# Patient Record
Sex: FEMALE | Race: BLACK OR AFRICAN AMERICAN | NOT HISPANIC OR LATINO | URBAN - METROPOLITAN AREA
[De-identification: names, ages, dates, MRNs, and addresses within clinical notes are randomized per-mention and may not be internally consistent; named-entity substitution may affect disease eponyms.]

---

## 2017-01-02 ENCOUNTER — EMERGENCY (EMERGENCY)
Facility: HOSPITAL | Age: 47
LOS: 0 days | Discharge: HOME | End: 2017-01-02
Attending: EMERGENCY MEDICINE
Payer: SELF-PAY

## 2017-01-02 VITALS
HEIGHT: 63 IN | OXYGEN SATURATION: 100 % | DIASTOLIC BLOOD PRESSURE: 78 MMHG | WEIGHT: 153 LBS | SYSTOLIC BLOOD PRESSURE: 129 MMHG | TEMPERATURE: 98 F | RESPIRATION RATE: 18 BRPM | HEART RATE: 84 BPM

## 2017-01-02 VITALS
RESPIRATION RATE: 17 BRPM | OXYGEN SATURATION: 100 % | HEART RATE: 67 BPM | DIASTOLIC BLOOD PRESSURE: 74 MMHG | SYSTOLIC BLOOD PRESSURE: 118 MMHG

## 2017-01-02 DIAGNOSIS — E11.9 TYPE 2 DIABETES MELLITUS WITHOUT COMPLICATIONS: ICD-10-CM

## 2017-01-02 DIAGNOSIS — I10 ESSENTIAL (PRIMARY) HYPERTENSION: ICD-10-CM

## 2017-01-02 DIAGNOSIS — K59.01 SLOW TRANSIT CONSTIPATION: ICD-10-CM

## 2017-01-02 DIAGNOSIS — D50.9 IRON DEFICIENCY ANEMIA, UNSPECIFIED: ICD-10-CM

## 2017-01-02 DIAGNOSIS — R10.9 UNSPECIFIED ABDOMINAL PAIN: ICD-10-CM

## 2017-01-02 DIAGNOSIS — O00.9 ECTOPIC PREGNANCY, UNSPECIFIED: Chronic | ICD-10-CM

## 2017-01-02 LAB
APPEARANCE UR: CLEAR — SIGNIFICANT CHANGE UP
BACTERIA # UR AUTO: ABNORMAL
BILIRUB UR-MCNC: NEGATIVE — SIGNIFICANT CHANGE UP
COLOR SPEC: YELLOW — SIGNIFICANT CHANGE UP
DIFF PNL FLD: ABNORMAL
EPI CELLS # UR: SIGNIFICANT CHANGE UP
GLUCOSE UR QL: NEGATIVE MG/DL — SIGNIFICANT CHANGE UP
HCG UR QL: NEGATIVE — SIGNIFICANT CHANGE UP
HYALINE CASTS # UR AUTO: ABNORMAL /LPF
KETONES UR-MCNC: NEGATIVE — SIGNIFICANT CHANGE UP
LEUKOCYTE ESTERASE UR-ACNC: NEGATIVE — SIGNIFICANT CHANGE UP
NITRITE UR-MCNC: NEGATIVE — SIGNIFICANT CHANGE UP
PH UR: 5 — SIGNIFICANT CHANGE UP (ref 4.8–8)
PROT UR-MCNC: NEGATIVE MG/DL — SIGNIFICANT CHANGE UP
RBC CASTS # UR COMP ASSIST: SIGNIFICANT CHANGE UP /HPF (ref 0–4)
SP GR SPEC: 1.02 — SIGNIFICANT CHANGE UP (ref 1.01–1.02)
UROBILINOGEN FLD QL: NEGATIVE MG/DL — SIGNIFICANT CHANGE UP
WBC UR QL: SIGNIFICANT CHANGE UP

## 2017-01-02 PROCEDURE — 99284 EMERGENCY DEPT VISIT MOD MDM: CPT | Mod: 25

## 2017-01-02 PROCEDURE — 99053 MED SERV 10PM-8AM 24 HR FAC: CPT

## 2017-01-02 PROCEDURE — 74000: CPT | Mod: 26

## 2017-01-02 RX ORDER — SENNA PLUS 8.6 MG/1
1 TABLET ORAL
Qty: 14 | Refills: 0 | OUTPATIENT
Start: 2017-01-02 | End: 2017-01-16

## 2017-01-02 RX ORDER — DOCUSATE SODIUM 100 MG
1 CAPSULE ORAL
Qty: 14 | Refills: 0 | OUTPATIENT
Start: 2017-01-02 | End: 2017-01-09

## 2017-01-02 NOTE — ED PROVIDER NOTE - MEDICAL DECISION MAKING DETAILS
patient pw constipation that is likely preventing bladder from full distension. no evidence of uti. does not give hx suggestive of sti. doubt appendicitis given absence of tenderness. will tx with stool softeners.

## 2017-01-02 NOTE — ED PROVIDER NOTE - OBJECTIVE STATEMENT
Pertinent PMH/PSH/FHx/SHx and Review of Systems contained within:  46f hx of htn, dm pw abd discomfort x2 days. patient has the sensation that passing bowel movements is hard for her and as a result she is having urinary frequency but of small amounts. no dysuria, no vaginal dc, no dispareunia, no vomiting, no fever.   Fh and Sh not otherwise contributory  ROS otherwise negative

## 2017-01-03 LAB
CULTURE RESULTS: SIGNIFICANT CHANGE UP
SPECIMEN SOURCE: SIGNIFICANT CHANGE UP

## 2017-02-07 ENCOUNTER — EMERGENCY (EMERGENCY)
Facility: HOSPITAL | Age: 47
LOS: 0 days | Discharge: HOME | End: 2017-02-07
Attending: EMERGENCY MEDICINE
Payer: COMMERCIAL

## 2017-02-07 VITALS
DIASTOLIC BLOOD PRESSURE: 78 MMHG | RESPIRATION RATE: 18 BRPM | SYSTOLIC BLOOD PRESSURE: 130 MMHG | OXYGEN SATURATION: 100 % | TEMPERATURE: 98 F | HEART RATE: 88 BPM

## 2017-02-07 VITALS
SYSTOLIC BLOOD PRESSURE: 145 MMHG | RESPIRATION RATE: 18 BRPM | HEART RATE: 100 BPM | HEIGHT: 62 IN | DIASTOLIC BLOOD PRESSURE: 82 MMHG | WEIGHT: 158.07 LBS | OXYGEN SATURATION: 100 % | TEMPERATURE: 98 F

## 2017-02-07 DIAGNOSIS — O00.9 ECTOPIC PREGNANCY, UNSPECIFIED: Chronic | ICD-10-CM

## 2017-02-07 DIAGNOSIS — E11.9 TYPE 2 DIABETES MELLITUS WITHOUT COMPLICATIONS: ICD-10-CM

## 2017-02-07 DIAGNOSIS — Z79.4 LONG TERM (CURRENT) USE OF INSULIN: ICD-10-CM

## 2017-02-07 DIAGNOSIS — Z88.8 ALLERGY STATUS TO OTHER DRUGS, MEDICAMENTS AND BIOLOGICAL SUBSTANCES STATUS: ICD-10-CM

## 2017-02-07 DIAGNOSIS — D50.9 IRON DEFICIENCY ANEMIA, UNSPECIFIED: ICD-10-CM

## 2017-02-07 DIAGNOSIS — I10 ESSENTIAL (PRIMARY) HYPERTENSION: ICD-10-CM

## 2017-02-07 DIAGNOSIS — R00.2 PALPITATIONS: ICD-10-CM

## 2017-02-07 LAB
ALBUMIN SERPL ELPH-MCNC: 3.6 G/DL — SIGNIFICANT CHANGE UP (ref 3.3–5)
ALP SERPL-CCNC: 52 U/L — SIGNIFICANT CHANGE UP (ref 40–120)
ALT FLD-CCNC: 17 U/L — SIGNIFICANT CHANGE UP (ref 12–78)
ANION GAP SERPL CALC-SCNC: 7 MMOL/L — SIGNIFICANT CHANGE UP (ref 5–17)
ANISOCYTOSIS BLD QL: SLIGHT — SIGNIFICANT CHANGE UP
AST SERPL-CCNC: 11 U/L — LOW (ref 15–37)
BASOPHILS # BLD AUTO: 0 K/UL — SIGNIFICANT CHANGE UP (ref 0–0.2)
BASOPHILS NFR BLD AUTO: 1 % — SIGNIFICANT CHANGE UP (ref 0–2)
BILIRUB SERPL-MCNC: 0.4 MG/DL — SIGNIFICANT CHANGE UP (ref 0.2–1.2)
BUN SERPL-MCNC: 9 MG/DL — SIGNIFICANT CHANGE UP (ref 7–23)
CALCIUM SERPL-MCNC: 8.6 MG/DL — SIGNIFICANT CHANGE UP (ref 8.5–10.1)
CHLORIDE SERPL-SCNC: 106 MMOL/L — SIGNIFICANT CHANGE UP (ref 96–108)
CK MB BLD-MCNC: 1.1 % — SIGNIFICANT CHANGE UP (ref 0–3.5)
CK MB CFR SERPL CALC: 1.8 NG/ML — SIGNIFICANT CHANGE UP (ref 0.5–3.6)
CK SERPL-CCNC: 164 U/L — SIGNIFICANT CHANGE UP (ref 26–192)
CO2 SERPL-SCNC: 28 MMOL/L — SIGNIFICANT CHANGE UP (ref 22–31)
CREAT SERPL-MCNC: 0.79 MG/DL — SIGNIFICANT CHANGE UP (ref 0.5–1.3)
EOSINOPHIL # BLD AUTO: 0.1 K/UL — SIGNIFICANT CHANGE UP (ref 0–0.5)
EOSINOPHIL NFR BLD AUTO: 1.9 % — SIGNIFICANT CHANGE UP (ref 0–6)
GLUCOSE SERPL-MCNC: 141 MG/DL — HIGH (ref 70–99)
HCT VFR BLD CALC: 32.8 % — LOW (ref 34.5–45)
HGB BLD-MCNC: 10.6 G/DL — LOW (ref 11.5–15.5)
HYPOCHROMIA BLD QL: SIGNIFICANT CHANGE UP
LG PLATELETS BLD QL AUTO: SLIGHT — SIGNIFICANT CHANGE UP
LYMPHOCYTES # BLD AUTO: 1.4 K/UL — SIGNIFICANT CHANGE UP (ref 1–3.3)
LYMPHOCYTES # BLD AUTO: 30.4 % — SIGNIFICANT CHANGE UP (ref 13–44)
MACROCYTES BLD QL: SLIGHT — SIGNIFICANT CHANGE UP
MCHC RBC-ENTMCNC: 22.9 PG — LOW (ref 27–34)
MCHC RBC-ENTMCNC: 32.3 GM/DL — SIGNIFICANT CHANGE UP (ref 32–36)
MCV RBC AUTO: 70.8 FL — LOW (ref 80–100)
MICROCYTES BLD QL: SLIGHT — SIGNIFICANT CHANGE UP
MONOCYTES # BLD AUTO: 0.3 K/UL — SIGNIFICANT CHANGE UP (ref 0–0.9)
MONOCYTES NFR BLD AUTO: 6.8 % — SIGNIFICANT CHANGE UP (ref 2–14)
NEUTROPHILS # BLD AUTO: 2.7 K/UL — SIGNIFICANT CHANGE UP (ref 1.8–7.4)
NEUTROPHILS NFR BLD AUTO: 59.9 % — SIGNIFICANT CHANGE UP (ref 43–77)
OVALOCYTES BLD QL SMEAR: SIGNIFICANT CHANGE UP
PLAT MORPH BLD: NORMAL — SIGNIFICANT CHANGE UP
PLATELET # BLD AUTO: 216 K/UL — SIGNIFICANT CHANGE UP (ref 150–400)
POIKILOCYTOSIS BLD QL AUTO: SLIGHT — SIGNIFICANT CHANGE UP
POLYCHROMASIA BLD QL SMEAR: SLIGHT — SIGNIFICANT CHANGE UP
POTASSIUM SERPL-MCNC: 3.8 MMOL/L — SIGNIFICANT CHANGE UP (ref 3.5–5.3)
POTASSIUM SERPL-SCNC: 3.8 MMOL/L — SIGNIFICANT CHANGE UP (ref 3.5–5.3)
PROT SERPL-MCNC: 7.3 GM/DL — SIGNIFICANT CHANGE UP (ref 6–8.3)
RBC # BLD: 4.63 M/UL — SIGNIFICANT CHANGE UP (ref 3.8–5.2)
RBC # FLD: 15.2 % — HIGH (ref 11–15)
RBC BLD AUTO: ABNORMAL
SCHISTOCYTES BLD QL AUTO: SLIGHT — SIGNIFICANT CHANGE UP
SODIUM SERPL-SCNC: 141 MMOL/L — SIGNIFICANT CHANGE UP (ref 135–145)
TARGETS BLD QL SMEAR: SLIGHT — SIGNIFICANT CHANGE UP
TROPONIN I SERPL-MCNC: <.015 NG/ML — SIGNIFICANT CHANGE UP (ref 0.01–0.04)
TSH SERPL-MCNC: 2.21 UIU/ML — SIGNIFICANT CHANGE UP (ref 0.36–3.74)
WBC # BLD: 4.6 K/UL — SIGNIFICANT CHANGE UP (ref 3.8–10.5)
WBC # FLD AUTO: 4.6 K/UL — SIGNIFICANT CHANGE UP (ref 3.8–10.5)

## 2017-02-07 PROCEDURE — 99285 EMERGENCY DEPT VISIT HI MDM: CPT | Mod: 25

## 2017-02-07 NOTE — ED PROVIDER NOTE - PHYSICAL EXAMINATION
Gen: Alert, NAD, speaking in complete sentences;  Head: NC, AT, EOMI, normal lids/conjunctiva;  ENT: normal hearing, patent oropharynx, MMM;  Neck: supple, no tenderness/meningismus, FROM;  Pulm: Bilateral clear BS, normal resp effort, no wheeze/stridor/retractions;  CV: RRR, no M/R/G, +dist pulses;  Abd: soft, NT/ND, +BS, no guarding/rebound tenderness;  Mskel: no edema/erythema/cyanosis;  Skin: no rash;  Neuro: AAOx3, no sensory/motor deficits

## 2017-02-07 NOTE — ED PROVIDER NOTE - MEDICAL DECISION MAKING DETAILS
Pt in NAD, VSS.  Discussed results and outcome of testing with the patient, given copy as well.  Patient advised to please follow up with their primary care doctor within the next 24 hours and return to the Emergency Department for worsening symptoms or any other concerns.  Patient advised that their doctor may call  to follow up on the specific results of the tests performed today in the emergency department.

## 2017-02-07 NOTE — ED ADULT TRIAGE NOTE - CHIEF COMPLAINT QUOTE
I've having palpitations.  I went home from work and went to bed.  I woke up with my chest pounding.

## 2017-02-07 NOTE — ED PROVIDER NOTE - OBJECTIVE STATEMENT
Pertinent PMH/PSH/FHx/SHx and Review of Systems contained within:    45yo F w PMH of HTN, DM, iron deficiency anemia, uterine fibroids presents to ED c/o palpitations.  Pt states she was in bed trying to sleep when she was woken up by feeling of palpitations.  Pt states she drank orange juice and took GERD medications, currently in ED states sx resolved.  Denies fever, chills, cough, SOB, CP, abd pain.  Pt states she did have caffeine during the day which may have contributed to sx.    No fever/chills, No photophobia/eye pain/changes in vision, No ear pain/sore throat/dysphagia, No chest pain, no SOB/cough/wheeze/stridor, No abdominal pain, No N/V/D, no dysuria/frequency/discharge, No neck/back pain, no rash, no changes in neurological status/function.

## 2017-04-17 ENCOUNTER — EMERGENCY (EMERGENCY)
Facility: HOSPITAL | Age: 47
LOS: 1 days | Discharge: HOME | End: 2017-04-17
Attending: EMERGENCY MEDICINE
Payer: COMMERCIAL

## 2017-04-17 VITALS
RESPIRATION RATE: 16 BRPM | HEART RATE: 85 BPM | OXYGEN SATURATION: 100 % | TEMPERATURE: 99 F | WEIGHT: 154.1 LBS | HEIGHT: 63 IN | SYSTOLIC BLOOD PRESSURE: 118 MMHG | DIASTOLIC BLOOD PRESSURE: 74 MMHG

## 2017-04-17 VITALS
OXYGEN SATURATION: 99 % | DIASTOLIC BLOOD PRESSURE: 77 MMHG | TEMPERATURE: 98 F | RESPIRATION RATE: 16 BRPM | HEART RATE: 79 BPM | SYSTOLIC BLOOD PRESSURE: 114 MMHG

## 2017-04-17 DIAGNOSIS — O00.9 ECTOPIC PREGNANCY, UNSPECIFIED: Chronic | ICD-10-CM

## 2017-04-17 DIAGNOSIS — Z79.2 LONG TERM (CURRENT) USE OF ANTIBIOTICS: ICD-10-CM

## 2017-04-17 DIAGNOSIS — R07.9 CHEST PAIN, UNSPECIFIED: ICD-10-CM

## 2017-04-17 DIAGNOSIS — D50.9 IRON DEFICIENCY ANEMIA, UNSPECIFIED: ICD-10-CM

## 2017-04-17 DIAGNOSIS — I10 ESSENTIAL (PRIMARY) HYPERTENSION: ICD-10-CM

## 2017-04-17 DIAGNOSIS — E11.9 TYPE 2 DIABETES MELLITUS WITHOUT COMPLICATIONS: ICD-10-CM

## 2017-04-17 DIAGNOSIS — Z79.84 LONG TERM (CURRENT) USE OF ORAL HYPOGLYCEMIC DRUGS: ICD-10-CM

## 2017-04-17 DIAGNOSIS — J06.9 ACUTE UPPER RESPIRATORY INFECTION, UNSPECIFIED: ICD-10-CM

## 2017-04-17 PROCEDURE — 99283 EMERGENCY DEPT VISIT LOW MDM: CPT | Mod: 25

## 2017-04-17 NOTE — ED ADULT TRIAGE NOTE - CHIEF COMPLAINT QUOTE
Patient reports "coughing up yellow, Body aches, losing my voice and sore throat." Patient with symptoms since Thursday. no relief with allergy medication.

## 2017-04-17 NOTE — ED PROVIDER NOTE - OBJECTIVE STATEMENT
Pt is a 48 yo lady with a pmhx of HTN, DM who presents to the ED with URI symptoms for 3 days. She has had a sore throat, cough with some yellow sputum. No fevers, no chest pain, no sob, no n/v/d, no body aches. Had a flu shot this year. No sick contacts. Breathing comfortably. Wanted to get checked out.

## 2017-04-17 NOTE — ED ADULT NURSE NOTE - OBJECTIVE STATEMENT
pt is AxOx4; c/o "coughing up yellow, Body aches, losing my voice and sore throat." Patient with symptoms since Thursday.

## 2017-12-01 ENCOUNTER — OUTPATIENT (OUTPATIENT)
Dept: OUTPATIENT SERVICES | Facility: HOSPITAL | Age: 47
LOS: 1 days | End: 2017-12-01
Payer: MEDICAID

## 2017-12-01 DIAGNOSIS — O00.9 ECTOPIC PREGNANCY, UNSPECIFIED: Chronic | ICD-10-CM

## 2017-12-01 PROCEDURE — G9001: CPT

## 2017-12-17 ENCOUNTER — EMERGENCY (EMERGENCY)
Facility: HOSPITAL | Age: 47
LOS: 0 days | Discharge: HOME | End: 2017-12-17
Attending: EMERGENCY MEDICINE
Payer: COMMERCIAL

## 2017-12-17 VITALS
RESPIRATION RATE: 17 BRPM | HEART RATE: 81 BPM | DIASTOLIC BLOOD PRESSURE: 73 MMHG | TEMPERATURE: 98 F | OXYGEN SATURATION: 100 % | SYSTOLIC BLOOD PRESSURE: 115 MMHG

## 2017-12-17 VITALS
WEIGHT: 151.9 LBS | OXYGEN SATURATION: 99 % | RESPIRATION RATE: 16 BRPM | DIASTOLIC BLOOD PRESSURE: 73 MMHG | TEMPERATURE: 98 F | SYSTOLIC BLOOD PRESSURE: 122 MMHG | HEIGHT: 63 IN | HEART RATE: 91 BPM

## 2017-12-17 DIAGNOSIS — O00.9 ECTOPIC PREGNANCY, UNSPECIFIED: Chronic | ICD-10-CM

## 2017-12-17 LAB
ALBUMIN SERPL ELPH-MCNC: 3.6 G/DL — SIGNIFICANT CHANGE UP (ref 3.3–5)
ALP SERPL-CCNC: 51 U/L — SIGNIFICANT CHANGE UP (ref 40–120)
ALT FLD-CCNC: 18 U/L — SIGNIFICANT CHANGE UP (ref 12–78)
ANION GAP SERPL CALC-SCNC: 7 MMOL/L — SIGNIFICANT CHANGE UP (ref 5–17)
AST SERPL-CCNC: 14 U/L — LOW (ref 15–37)
BILIRUB SERPL-MCNC: 0.2 MG/DL — SIGNIFICANT CHANGE UP (ref 0.2–1.2)
BUN SERPL-MCNC: 8 MG/DL — SIGNIFICANT CHANGE UP (ref 7–23)
CALCIUM SERPL-MCNC: 8.3 MG/DL — LOW (ref 8.5–10.1)
CHLORIDE SERPL-SCNC: 107 MMOL/L — SIGNIFICANT CHANGE UP (ref 96–108)
CO2 SERPL-SCNC: 24 MMOL/L — SIGNIFICANT CHANGE UP (ref 22–31)
CREAT SERPL-MCNC: 0.72 MG/DL — SIGNIFICANT CHANGE UP (ref 0.5–1.3)
GLUCOSE SERPL-MCNC: 153 MG/DL — HIGH (ref 70–99)
HCG SERPL-ACNC: <1 MIU/ML — SIGNIFICANT CHANGE UP
HCT VFR BLD CALC: 34.2 % — LOW (ref 34.5–45)
HGB BLD-MCNC: 10.2 G/DL — LOW (ref 11.5–15.5)
MCHC RBC-ENTMCNC: 21.9 PG — LOW (ref 27–34)
MCHC RBC-ENTMCNC: 29.6 GM/DL — LOW (ref 32–36)
MCV RBC AUTO: 73.7 FL — LOW (ref 80–100)
PLATELET # BLD AUTO: 235 K/UL — SIGNIFICANT CHANGE UP (ref 150–400)
POTASSIUM SERPL-MCNC: 4.1 MMOL/L — SIGNIFICANT CHANGE UP (ref 3.5–5.3)
POTASSIUM SERPL-SCNC: 4.1 MMOL/L — SIGNIFICANT CHANGE UP (ref 3.5–5.3)
PROT SERPL-MCNC: 7.4 GM/DL — SIGNIFICANT CHANGE UP (ref 6–8.3)
RBC # BLD: 4.64 M/UL — SIGNIFICANT CHANGE UP (ref 3.8–5.2)
RBC # FLD: 14.1 % — SIGNIFICANT CHANGE UP (ref 11–15)
SODIUM SERPL-SCNC: 138 MMOL/L — SIGNIFICANT CHANGE UP (ref 135–145)
TROPONIN I SERPL-MCNC: <.015 NG/ML — SIGNIFICANT CHANGE UP (ref 0.01–0.04)
WBC # BLD: 4.8 K/UL — SIGNIFICANT CHANGE UP (ref 3.8–10.5)
WBC # FLD AUTO: 4.8 K/UL — SIGNIFICANT CHANGE UP (ref 3.8–10.5)

## 2017-12-17 PROCEDURE — 93010 ELECTROCARDIOGRAM REPORT: CPT

## 2017-12-17 PROCEDURE — 99284 EMERGENCY DEPT VISIT MOD MDM: CPT | Mod: 25

## 2017-12-17 NOTE — ED PROVIDER NOTE - MEDICAL DECISION MAKING DETAILS
48 yo F with chest discomfort  -basic labs, r/o anemia, check trop x1 to r/o MI, discomfort present for hours  -f/u results, reeval, d/c with [facility] f/u outpt.

## 2017-12-17 NOTE — ED ADULT TRIAGE NOTE - CHIEF COMPLAINT QUOTE
Pt is feeling "uneasy" and is stating she has no pain or discomfort anywhere, but the "uneasiness" has only gotten worse.

## 2017-12-17 NOTE — ED PROVIDER NOTE - PROGRESS NOTE DETAILS
Results reported to patient--grossly benign  Pt. reports feeling better  pt. agrees to f/u with primary, [facility], care outpt.  pt. understands to return to ED if symptoms worsen; will d/c

## 2017-12-17 NOTE — ED PROVIDER NOTE - OBJECTIVE STATEMENT
46 yo F with "uneasiness" in her chest this evening.  She says she just wasn't feeling right and she wanted to get checked out.  She notes hx of iron def anemia, previously requiring blood transfusion.  She denies chest pain.  No other complaints currently.  ROS: negative for fever, cough, headache, chest pain, shortness of breath, abd pain, nausea, vomiting, diarrhea, rash, paresthesia, and weakness.   PMH: HTN, NIDDM, iron def anemia; Meds: iron, metformin 500, amlodipine 5; SH: Denies smoking/drinking/drug use

## 2017-12-18 DIAGNOSIS — R07.9 CHEST PAIN, UNSPECIFIED: ICD-10-CM

## 2017-12-18 DIAGNOSIS — D50.9 IRON DEFICIENCY ANEMIA, UNSPECIFIED: ICD-10-CM

## 2017-12-18 DIAGNOSIS — E11.9 TYPE 2 DIABETES MELLITUS WITHOUT COMPLICATIONS: ICD-10-CM

## 2017-12-18 DIAGNOSIS — I10 ESSENTIAL (PRIMARY) HYPERTENSION: ICD-10-CM

## 2017-12-19 DIAGNOSIS — R69 ILLNESS, UNSPECIFIED: ICD-10-CM

## 2018-04-29 ENCOUNTER — EMERGENCY (EMERGENCY)
Facility: HOSPITAL | Age: 48
LOS: 0 days | Discharge: HOME | End: 2018-04-29
Attending: EMERGENCY MEDICINE
Payer: MEDICAID

## 2018-04-29 VITALS
DIASTOLIC BLOOD PRESSURE: 58 MMHG | TEMPERATURE: 98 F | RESPIRATION RATE: 20 BRPM | HEART RATE: 75 BPM | SYSTOLIC BLOOD PRESSURE: 101 MMHG | OXYGEN SATURATION: 99 %

## 2018-04-29 VITALS
SYSTOLIC BLOOD PRESSURE: 118 MMHG | DIASTOLIC BLOOD PRESSURE: 71 MMHG | WEIGHT: 151.9 LBS | RESPIRATION RATE: 16 BRPM | HEART RATE: 88 BPM | TEMPERATURE: 98 F | OXYGEN SATURATION: 100 % | HEIGHT: 63 IN

## 2018-04-29 DIAGNOSIS — O00.9 ECTOPIC PREGNANCY, UNSPECIFIED: Chronic | ICD-10-CM

## 2018-04-29 LAB
ALBUMIN SERPL ELPH-MCNC: 3.7 G/DL — SIGNIFICANT CHANGE UP (ref 3.3–5)
ALP SERPL-CCNC: 59 U/L — SIGNIFICANT CHANGE UP (ref 40–120)
ALT FLD-CCNC: 17 U/L — SIGNIFICANT CHANGE UP (ref 12–78)
ANION GAP SERPL CALC-SCNC: 7 MMOL/L — SIGNIFICANT CHANGE UP (ref 5–17)
APTT BLD: 33.4 SEC — SIGNIFICANT CHANGE UP (ref 27.5–37.4)
AST SERPL-CCNC: 13 U/L — LOW (ref 15–37)
BASOPHILS # BLD AUTO: 0.04 K/UL — SIGNIFICANT CHANGE UP (ref 0–0.2)
BASOPHILS NFR BLD AUTO: 1.2 % — SIGNIFICANT CHANGE UP (ref 0–2)
BILIRUB SERPL-MCNC: 0.2 MG/DL — SIGNIFICANT CHANGE UP (ref 0.2–1.2)
BUN SERPL-MCNC: 8 MG/DL — SIGNIFICANT CHANGE UP (ref 7–23)
CALCIUM SERPL-MCNC: 8.8 MG/DL — SIGNIFICANT CHANGE UP (ref 8.5–10.1)
CHLORIDE SERPL-SCNC: 108 MMOL/L — SIGNIFICANT CHANGE UP (ref 96–108)
CK MB BLD-MCNC: 0.8 % — SIGNIFICANT CHANGE UP (ref 0–3.5)
CK MB BLD-MCNC: 1.2 % — SIGNIFICANT CHANGE UP (ref 0–3.5)
CK MB CFR SERPL CALC: 0.6 NG/ML — SIGNIFICANT CHANGE UP (ref 0.5–3.6)
CK MB CFR SERPL CALC: 0.8 NG/ML — SIGNIFICANT CHANGE UP (ref 0.5–3.6)
CK SERPL-CCNC: 67 U/L — SIGNIFICANT CHANGE UP (ref 26–192)
CK SERPL-CCNC: 72 U/L — SIGNIFICANT CHANGE UP (ref 26–192)
CO2 SERPL-SCNC: 24 MMOL/L — SIGNIFICANT CHANGE UP (ref 22–31)
CREAT SERPL-MCNC: 0.79 MG/DL — SIGNIFICANT CHANGE UP (ref 0.5–1.3)
D DIMER BLD IA.RAPID-MCNC: <150 NG/ML DDU — SIGNIFICANT CHANGE UP
EOSINOPHIL # BLD AUTO: 0.06 K/UL — SIGNIFICANT CHANGE UP (ref 0–0.5)
EOSINOPHIL NFR BLD AUTO: 1.8 % — SIGNIFICANT CHANGE UP (ref 0–6)
GLUCOSE SERPL-MCNC: 106 MG/DL — HIGH (ref 70–99)
HCG SERPL-ACNC: <1 MIU/ML — SIGNIFICANT CHANGE UP
HCT VFR BLD CALC: 33.7 % — LOW (ref 34.5–45)
HGB BLD-MCNC: 10 G/DL — LOW (ref 11.5–15.5)
IMM GRANULOCYTES NFR BLD AUTO: 0.3 % — SIGNIFICANT CHANGE UP (ref 0–1.5)
INR BLD: 0.95 RATIO — SIGNIFICANT CHANGE UP (ref 0.88–1.16)
LYMPHOCYTES # BLD AUTO: 1.12 K/UL — SIGNIFICANT CHANGE UP (ref 1–3.3)
LYMPHOCYTES # BLD AUTO: 32.9 % — SIGNIFICANT CHANGE UP (ref 13–44)
MCHC RBC-ENTMCNC: 21.1 PG — LOW (ref 27–34)
MCHC RBC-ENTMCNC: 29.7 GM/DL — LOW (ref 32–36)
MCV RBC AUTO: 71.2 FL — LOW (ref 80–100)
MONOCYTES # BLD AUTO: 0.25 K/UL — SIGNIFICANT CHANGE UP (ref 0–0.9)
MONOCYTES NFR BLD AUTO: 7.4 % — SIGNIFICANT CHANGE UP (ref 2–14)
NEUTROPHILS # BLD AUTO: 1.92 K/UL — SIGNIFICANT CHANGE UP (ref 1.8–7.4)
NEUTROPHILS NFR BLD AUTO: 56.4 % — SIGNIFICANT CHANGE UP (ref 43–77)
NRBC # BLD: 0 /100 WBCS — SIGNIFICANT CHANGE UP (ref 0–0)
PLATELET # BLD AUTO: 288 K/UL — SIGNIFICANT CHANGE UP (ref 150–400)
POTASSIUM SERPL-MCNC: 3.8 MMOL/L — SIGNIFICANT CHANGE UP (ref 3.5–5.3)
POTASSIUM SERPL-SCNC: 3.8 MMOL/L — SIGNIFICANT CHANGE UP (ref 3.5–5.3)
PROT SERPL-MCNC: 7.3 GM/DL — SIGNIFICANT CHANGE UP (ref 6–8.3)
PROTHROM AB SERPL-ACNC: 10.3 SEC — SIGNIFICANT CHANGE UP (ref 9.8–12.7)
RBC # BLD: 4.73 M/UL — SIGNIFICANT CHANGE UP (ref 3.8–5.2)
RBC # FLD: 17.8 % — HIGH (ref 10.3–14.5)
SODIUM SERPL-SCNC: 139 MMOL/L — SIGNIFICANT CHANGE UP (ref 135–145)
TROPONIN I SERPL-MCNC: <.015 NG/ML — SIGNIFICANT CHANGE UP (ref 0.01–0.04)
TROPONIN I SERPL-MCNC: <.015 NG/ML — SIGNIFICANT CHANGE UP (ref 0.01–0.04)
TSH SERPL-MCNC: 0.92 UU/ML — SIGNIFICANT CHANGE UP (ref 0.36–3.74)
WBC # BLD: 3.4 K/UL — LOW (ref 3.8–10.5)
WBC # FLD AUTO: 3.4 K/UL — LOW (ref 3.8–10.5)

## 2018-04-29 PROCEDURE — 93010 ELECTROCARDIOGRAM REPORT: CPT

## 2018-04-29 PROCEDURE — 71045 X-RAY EXAM CHEST 1 VIEW: CPT | Mod: 26

## 2018-04-29 PROCEDURE — 99284 EMERGENCY DEPT VISIT MOD MDM: CPT

## 2018-04-29 RX ORDER — SODIUM CHLORIDE 9 MG/ML
3 INJECTION INTRAMUSCULAR; INTRAVENOUS; SUBCUTANEOUS ONCE
Qty: 0 | Refills: 0 | Status: COMPLETED | OUTPATIENT
Start: 2018-04-29 | End: 2018-04-29

## 2018-04-29 RX ADMIN — SODIUM CHLORIDE 3 MILLILITER(S): 9 INJECTION INTRAMUSCULAR; INTRAVENOUS; SUBCUTANEOUS at 12:58

## 2018-04-29 NOTE — ED ADULT NURSE NOTE - OBJECTIVE STATEMENT
received patient alert and orient came in today after having two episodes of palpitations today ivhl 320 by ems left a/c blood obtained. ekg done and placed on monitor. no chest pain. placed on monitor will report to primary rn Lainey

## 2018-04-29 NOTE — ED ADULT NURSE REASSESSMENT NOTE - NS ED NURSE REASSESS COMMENT FT1
patient A&Ox3 in no acute distress , denied palpitation no chest pain no difficulty breathing at this time

## 2018-04-29 NOTE — ED PROVIDER NOTE - OBJECTIVE STATEMENT
48yoF; with pmh signif for DM, Fibroids, Anemia, HTN; now p/w palpitations and lightheadedness. patient states she was running around in morning and felt lightheadedness, took her Amlodipine, then felt palpitations. chest pain--sscp, pressure, non-radiating, lasting few minutes, denies sob. denies f/c/s. denies cough. denies travel.  PMH: Fibroids, Anemia, HTN  SOCIAL: No tobacco/illicit substance use/socialEtOH

## 2018-04-29 NOTE — ED PROVIDER NOTE - NS ED ROS FT
Constitutional: (-) fever  (-)chills  (-)sweats  Eyes/ENT: (-) blurry vision, (-) epistaxis  (-)rhinorrhea   (-) sore throat    Cardiovascular: (+) chest pain, (+) palpitations (-) edema   Respiratory: (-) cough, (-) shortness of breath   Gastrointestinal: (-)nausea  (-)vomiting, (-) diarrhea  (-) abdominal pain   Musculoskeletal: (-) neck pain, (-) back pain, (-) joint pain  Integumentary: (-) rash, (-) edema  Neurological: (-) headache, (-) altered mental status  (-)LOC

## 2018-05-01 DIAGNOSIS — D50.9 IRON DEFICIENCY ANEMIA, UNSPECIFIED: ICD-10-CM

## 2018-05-01 DIAGNOSIS — R00.2 PALPITATIONS: ICD-10-CM

## 2018-05-01 DIAGNOSIS — D25.9 LEIOMYOMA OF UTERUS, UNSPECIFIED: ICD-10-CM

## 2018-05-01 DIAGNOSIS — E11.9 TYPE 2 DIABETES MELLITUS WITHOUT COMPLICATIONS: ICD-10-CM

## 2018-05-01 DIAGNOSIS — I10 ESSENTIAL (PRIMARY) HYPERTENSION: ICD-10-CM

## 2018-07-17 ENCOUNTER — EMERGENCY (EMERGENCY)
Facility: HOSPITAL | Age: 48
LOS: 0 days | Discharge: HOME | End: 2018-07-17
Attending: EMERGENCY MEDICINE
Payer: MEDICAID

## 2018-07-17 VITALS
OXYGEN SATURATION: 100 % | TEMPERATURE: 99 F | SYSTOLIC BLOOD PRESSURE: 148 MMHG | WEIGHT: 151.9 LBS | HEART RATE: 89 BPM | RESPIRATION RATE: 18 BRPM | DIASTOLIC BLOOD PRESSURE: 80 MMHG | HEIGHT: 62 IN

## 2018-07-17 VITALS
OXYGEN SATURATION: 99 % | SYSTOLIC BLOOD PRESSURE: 111 MMHG | TEMPERATURE: 99 F | DIASTOLIC BLOOD PRESSURE: 62 MMHG | HEART RATE: 70 BPM | RESPIRATION RATE: 18 BRPM

## 2018-07-17 DIAGNOSIS — R07.89 OTHER CHEST PAIN: ICD-10-CM

## 2018-07-17 DIAGNOSIS — D50.9 IRON DEFICIENCY ANEMIA, UNSPECIFIED: ICD-10-CM

## 2018-07-17 DIAGNOSIS — O00.9 ECTOPIC PREGNANCY, UNSPECIFIED: Chronic | ICD-10-CM

## 2018-07-17 DIAGNOSIS — I10 ESSENTIAL (PRIMARY) HYPERTENSION: ICD-10-CM

## 2018-07-17 DIAGNOSIS — E11.9 TYPE 2 DIABETES MELLITUS WITHOUT COMPLICATIONS: ICD-10-CM

## 2018-07-17 DIAGNOSIS — R10.9 UNSPECIFIED ABDOMINAL PAIN: ICD-10-CM

## 2018-07-17 LAB
ALBUMIN SERPL ELPH-MCNC: 3.7 G/DL — SIGNIFICANT CHANGE UP (ref 3.3–5)
ALP SERPL-CCNC: 50 U/L — SIGNIFICANT CHANGE UP (ref 40–120)
ALT FLD-CCNC: 16 U/L — SIGNIFICANT CHANGE UP (ref 12–78)
ANION GAP SERPL CALC-SCNC: 9 MMOL/L — SIGNIFICANT CHANGE UP (ref 5–17)
APTT BLD: 33.6 SEC — SIGNIFICANT CHANGE UP (ref 27.5–37.4)
AST SERPL-CCNC: 13 U/L — LOW (ref 15–37)
BILIRUB SERPL-MCNC: 0.3 MG/DL — SIGNIFICANT CHANGE UP (ref 0.2–1.2)
BUN SERPL-MCNC: 9 MG/DL — SIGNIFICANT CHANGE UP (ref 7–23)
CALCIUM SERPL-MCNC: 8.7 MG/DL — SIGNIFICANT CHANGE UP (ref 8.5–10.1)
CHLORIDE SERPL-SCNC: 106 MMOL/L — SIGNIFICANT CHANGE UP (ref 96–108)
CK MB BLD-MCNC: 0.9 % — SIGNIFICANT CHANGE UP (ref 0–3.5)
CK MB BLD-MCNC: 0.9 % — SIGNIFICANT CHANGE UP (ref 0–3.5)
CK MB CFR SERPL CALC: 0.9 NG/ML — SIGNIFICANT CHANGE UP (ref 0.5–3.6)
CK MB CFR SERPL CALC: 1 NG/ML — SIGNIFICANT CHANGE UP (ref 0.5–3.6)
CK SERPL-CCNC: 117 U/L — SIGNIFICANT CHANGE UP (ref 26–192)
CK SERPL-CCNC: 95 U/L — SIGNIFICANT CHANGE UP (ref 26–192)
CO2 SERPL-SCNC: 26 MMOL/L — SIGNIFICANT CHANGE UP (ref 22–31)
CREAT SERPL-MCNC: 0.69 MG/DL — SIGNIFICANT CHANGE UP (ref 0.5–1.3)
D DIMER BLD IA.RAPID-MCNC: <150 NG/ML DDU — SIGNIFICANT CHANGE UP
GLUCOSE SERPL-MCNC: 98 MG/DL — SIGNIFICANT CHANGE UP (ref 70–99)
HCG SERPL-ACNC: <1 MIU/ML — SIGNIFICANT CHANGE UP
HCT VFR BLD CALC: 32.6 % — LOW (ref 34.5–45)
HGB BLD-MCNC: 9.9 G/DL — LOW (ref 11.5–15.5)
INR BLD: 0.97 RATIO — SIGNIFICANT CHANGE UP (ref 0.88–1.16)
MCHC RBC-ENTMCNC: 21.8 PG — LOW (ref 27–34)
MCHC RBC-ENTMCNC: 30.4 GM/DL — LOW (ref 32–36)
MCV RBC AUTO: 71.8 FL — LOW (ref 80–100)
NRBC # BLD: 0 /100 WBCS — SIGNIFICANT CHANGE UP (ref 0–0)
NT-PROBNP SERPL-SCNC: 52 PG/ML — SIGNIFICANT CHANGE UP (ref 0–125)
PLATELET # BLD AUTO: 286 K/UL — SIGNIFICANT CHANGE UP (ref 150–400)
POTASSIUM SERPL-MCNC: 3.8 MMOL/L — SIGNIFICANT CHANGE UP (ref 3.5–5.3)
POTASSIUM SERPL-SCNC: 3.8 MMOL/L — SIGNIFICANT CHANGE UP (ref 3.5–5.3)
PROT SERPL-MCNC: 7.5 GM/DL — SIGNIFICANT CHANGE UP (ref 6–8.3)
PROTHROM AB SERPL-ACNC: 10.6 SEC — SIGNIFICANT CHANGE UP (ref 9.8–12.7)
RBC # BLD: 4.54 M/UL — SIGNIFICANT CHANGE UP (ref 3.8–5.2)
RBC # FLD: 17.6 % — HIGH (ref 10.3–14.5)
SODIUM SERPL-SCNC: 141 MMOL/L — SIGNIFICANT CHANGE UP (ref 135–145)
TROPONIN I SERPL-MCNC: <.015 NG/ML — SIGNIFICANT CHANGE UP (ref 0.01–0.04)
TROPONIN I SERPL-MCNC: <.015 NG/ML — SIGNIFICANT CHANGE UP (ref 0.01–0.04)
WBC # BLD: 6.08 K/UL — SIGNIFICANT CHANGE UP (ref 3.8–10.5)
WBC # FLD AUTO: 6.08 K/UL — SIGNIFICANT CHANGE UP (ref 3.8–10.5)

## 2018-07-17 PROCEDURE — 71045 X-RAY EXAM CHEST 1 VIEW: CPT | Mod: 26

## 2018-07-17 PROCEDURE — 99285 EMERGENCY DEPT VISIT HI MDM: CPT

## 2018-07-17 RX ORDER — MORPHINE SULFATE 50 MG/1
4 CAPSULE, EXTENDED RELEASE ORAL ONCE
Qty: 0 | Refills: 0 | Status: DISCONTINUED | OUTPATIENT
Start: 2018-07-17 | End: 2018-07-17

## 2018-07-17 RX ORDER — PANTOPRAZOLE SODIUM 20 MG/1
40 TABLET, DELAYED RELEASE ORAL ONCE
Qty: 0 | Refills: 0 | Status: COMPLETED | OUTPATIENT
Start: 2018-07-17 | End: 2018-07-17

## 2018-07-17 RX ORDER — OMEPRAZOLE 10 MG/1
1 CAPSULE, DELAYED RELEASE ORAL
Qty: 30 | Refills: 0 | OUTPATIENT
Start: 2018-07-17 | End: 2018-08-15

## 2018-07-17 RX ADMIN — MORPHINE SULFATE 4 MILLIGRAM(S): 50 CAPSULE, EXTENDED RELEASE ORAL at 18:19

## 2018-07-17 RX ADMIN — MORPHINE SULFATE 4 MILLIGRAM(S): 50 CAPSULE, EXTENDED RELEASE ORAL at 17:01

## 2018-07-17 RX ADMIN — PANTOPRAZOLE SODIUM 40 MILLIGRAM(S): 20 TABLET, DELAYED RELEASE ORAL at 22:54

## 2018-07-17 NOTE — ED ADULT NURSE NOTE - OBJECTIVE STATEMENT
since Sunday evening after having glass of champagne patient has uneasy feeling to epigastric area, painful swallowing "when it is going down" . ekg done

## 2018-07-17 NOTE — ED PROVIDER NOTE - OBJECTIVE STATEMENT
47 yo female presents with midpain upcoming swallowing x 48 hour; patients that drank champagne and complaining of pain began thereafter. Pain 8/10 at present and in intermittet.. Patient received colonoscopy in pastut not endospcopy, 49 yo female presents with mid  swallowing x 48 hour; patients that drank champagne and complaining of pain began thereafter. Pain 8/10 at present and in intermittent.. Patient received colonoscopy in the past has not received endospcopy, 49 yo female presents with mid swallowing x 48 hour; patients that drank champagne and complaining of pain began thereafter. Pain 8/10 at present and in intermittent.. Patient received colonoscopy in the past has not received endospcopy,

## 2019-04-29 ENCOUNTER — EMERGENCY (EMERGENCY)
Facility: HOSPITAL | Age: 49
LOS: 0 days | Discharge: HOME | End: 2019-04-30
Attending: EMERGENCY MEDICINE
Payer: MEDICAID

## 2019-04-29 VITALS
DIASTOLIC BLOOD PRESSURE: 86 MMHG | RESPIRATION RATE: 18 BRPM | SYSTOLIC BLOOD PRESSURE: 159 MMHG | HEIGHT: 63 IN | TEMPERATURE: 99 F | WEIGHT: 151.9 LBS | HEART RATE: 106 BPM | OXYGEN SATURATION: 97 %

## 2019-04-29 DIAGNOSIS — O00.9 ECTOPIC PREGNANCY, UNSPECIFIED: Chronic | ICD-10-CM

## 2019-04-29 PROCEDURE — 93010 ELECTROCARDIOGRAM REPORT: CPT

## 2019-04-29 PROCEDURE — 99285 EMERGENCY DEPT VISIT HI MDM: CPT | Mod: 25

## 2019-04-29 RX ORDER — CETIRIZINE HYDROCHLORIDE 10 MG/1
1 TABLET ORAL
Qty: 0 | Refills: 0 | COMMUNITY

## 2019-04-29 NOTE — ED ADULT NURSE NOTE - OBJECTIVE STATEMENT
Pt states " was at home and didn't feel right , chest feel uneasy, SOB intermittently started a week ago.  Pt also stated "started feel anxious a week ago when carbon monoxide machine started beeping." Pt denies palpitations, denies n/v Pt states " was at home today and didn't feel right , felt like chest was uneasy, and SOB intermittently started a week ago.  Pt also stated " I started feeling anxious a week ago when carbon monoxide machine started beeping." Pt denies palpitations, denies n/v

## 2019-04-30 VITALS
SYSTOLIC BLOOD PRESSURE: 112 MMHG | RESPIRATION RATE: 16 BRPM | OXYGEN SATURATION: 100 % | DIASTOLIC BLOOD PRESSURE: 71 MMHG | TEMPERATURE: 99 F | HEART RATE: 86 BPM

## 2019-04-30 LAB
ALBUMIN SERPL ELPH-MCNC: 3.7 G/DL — SIGNIFICANT CHANGE UP (ref 3.3–5)
ALP SERPL-CCNC: 65 U/L — SIGNIFICANT CHANGE UP (ref 40–120)
ALT FLD-CCNC: 15 U/L — SIGNIFICANT CHANGE UP (ref 12–78)
ANION GAP SERPL CALC-SCNC: 10 MMOL/L — SIGNIFICANT CHANGE UP (ref 5–17)
APTT BLD: 32.2 SEC — SIGNIFICANT CHANGE UP (ref 27.5–36.3)
AST SERPL-CCNC: 16 U/L — SIGNIFICANT CHANGE UP (ref 15–37)
BILIRUB SERPL-MCNC: 0.2 MG/DL — SIGNIFICANT CHANGE UP (ref 0.2–1.2)
BLOOD GAS SOURCE: SIGNIFICANT CHANGE UP
BUN SERPL-MCNC: 11 MG/DL — SIGNIFICANT CHANGE UP (ref 7–23)
CALCIUM SERPL-MCNC: 8.6 MG/DL — SIGNIFICANT CHANGE UP (ref 8.5–10.1)
CHLORIDE SERPL-SCNC: 107 MMOL/L — SIGNIFICANT CHANGE UP (ref 96–108)
CO2 SERPL-SCNC: 23 MMOL/L — SIGNIFICANT CHANGE UP (ref 22–31)
COHGB MFR BLDV: 1.1 % — SIGNIFICANT CHANGE UP (ref 0–2)
CREAT SERPL-MCNC: 0.82 MG/DL — SIGNIFICANT CHANGE UP (ref 0.5–1.3)
D DIMER BLD IA.RAPID-MCNC: <150 NG/ML DDU — SIGNIFICANT CHANGE UP
GLUCOSE BLDC GLUCOMTR-MCNC: 145 MG/DL — HIGH (ref 70–99)
GLUCOSE SERPL-MCNC: 219 MG/DL — HIGH (ref 70–99)
HCG SERPL-ACNC: <1 MIU/ML — SIGNIFICANT CHANGE UP
HCT VFR BLD CALC: 32.4 % — LOW (ref 34.5–45)
HGB BLD CALC-MCNC: 9.5 G/DL — LOW (ref 11.5–15.5)
HGB BLD-MCNC: 9.6 G/DL — LOW (ref 11.5–15.5)
INR BLD: 0.93 RATIO — SIGNIFICANT CHANGE UP (ref 0.88–1.16)
MCHC RBC-ENTMCNC: 21 PG — LOW (ref 27–34)
MCHC RBC-ENTMCNC: 29.6 GM/DL — LOW (ref 32–36)
MCV RBC AUTO: 70.7 FL — LOW (ref 80–100)
NRBC # BLD: 0 /100 WBCS — SIGNIFICANT CHANGE UP (ref 0–0)
PLATELET # BLD AUTO: 269 K/UL — SIGNIFICANT CHANGE UP (ref 150–400)
POTASSIUM SERPL-MCNC: 4 MMOL/L — SIGNIFICANT CHANGE UP (ref 3.5–5.3)
POTASSIUM SERPL-SCNC: 4 MMOL/L — SIGNIFICANT CHANGE UP (ref 3.5–5.3)
PROT SERPL-MCNC: 7.3 GM/DL — SIGNIFICANT CHANGE UP (ref 6–8.3)
PROTHROM AB SERPL-ACNC: 10.4 SEC — SIGNIFICANT CHANGE UP (ref 10–12.9)
RBC # BLD: 4.58 M/UL — SIGNIFICANT CHANGE UP (ref 3.8–5.2)
RBC # FLD: 16.5 % — HIGH (ref 10.3–14.5)
SODIUM SERPL-SCNC: 140 MMOL/L — SIGNIFICANT CHANGE UP (ref 135–145)
TROPONIN I SERPL-MCNC: <.015 NG/ML — SIGNIFICANT CHANGE UP (ref 0.01–0.04)
TROPONIN I SERPL-MCNC: <.015 NG/ML — SIGNIFICANT CHANGE UP (ref 0.01–0.04)
WBC # BLD: 4.31 K/UL — SIGNIFICANT CHANGE UP (ref 3.8–10.5)
WBC # FLD AUTO: 4.31 K/UL — SIGNIFICANT CHANGE UP (ref 3.8–10.5)

## 2019-04-30 PROCEDURE — 71046 X-RAY EXAM CHEST 2 VIEWS: CPT | Mod: 26
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
## 2019-04-30 NOTE — ED PROVIDER NOTE - CLINICAL SUMMARY MEDICAL DECISION MAKING FREE TEXT BOX
patient in good condition throughout ED course. d/c with care instructions. f/up with pmd. Discussed results and outcome of testing with the patient.  Patient advised to please follow up with their primary care doctor within the next 24 hours and return to the Emergency Department for worsening symptoms or any other concerns.  Patient advised that their doctor may call  to follow up on the specific results of the tests performed today in the emergency department.

## 2019-05-01 DIAGNOSIS — R07.89 OTHER CHEST PAIN: ICD-10-CM

## 2019-05-01 DIAGNOSIS — Z79.84 LONG TERM (CURRENT) USE OF ORAL HYPOGLYCEMIC DRUGS: ICD-10-CM
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
## 2019-05-01 DIAGNOSIS — E11.9 TYPE 2 DIABETES MELLITUS WITHOUT COMPLICATIONS: ICD-10-CM

## 2019-05-01 DIAGNOSIS — D50.9 IRON DEFICIENCY ANEMIA, UNSPECIFIED: ICD-10-CM
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
## 2019-05-01 DIAGNOSIS — I10 ESSENTIAL (PRIMARY) HYPERTENSION: ICD-10-CM

## 2019-05-30 ENCOUNTER — EMERGENCY (EMERGENCY)
Facility: HOSPITAL | Age: 49
LOS: 0 days | Discharge: HOME | End: 2019-05-30
Payer: COMMERCIAL

## 2019-05-30 VITALS
WEIGHT: 151.9 LBS | HEART RATE: 79 BPM | HEIGHT: 62 IN | TEMPERATURE: 98 F | OXYGEN SATURATION: 100 % | RESPIRATION RATE: 18 BRPM | SYSTOLIC BLOOD PRESSURE: 134 MMHG | DIASTOLIC BLOOD PRESSURE: 76 MMHG
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
## 2019-05-30 DIAGNOSIS — Z04.1 ENCOUNTER FOR EXAMINATION AND OBSERVATION FOLLOWING TRANSPORT ACCIDENT: ICD-10-CM

## 2019-05-30 DIAGNOSIS — D50.9 IRON DEFICIENCY ANEMIA, UNSPECIFIED: ICD-10-CM

## 2019-05-30 DIAGNOSIS — O00.9 ECTOPIC PREGNANCY, UNSPECIFIED: Chronic | ICD-10-CM

## 2019-05-30 DIAGNOSIS — I10 ESSENTIAL (PRIMARY) HYPERTENSION: ICD-10-CM
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
## 2019-05-30 DIAGNOSIS — T14.90XA INJURY, UNSPECIFIED, INITIAL ENCOUNTER: ICD-10-CM

## 2019-05-30 DIAGNOSIS — E11.9 TYPE 2 DIABETES MELLITUS WITHOUT COMPLICATIONS: ICD-10-CM

## 2019-05-30 PROCEDURE — 99283 EMERGENCY DEPT VISIT LOW MDM: CPT

## 2019-05-30 RX ORDER — ACETAMINOPHEN 500 MG
650 TABLET ORAL ONCE
Refills: 0 | Status: COMPLETED | OUTPATIENT
Start: 2019-05-30 | End: 2019-05-30

## 2019-05-30 RX ORDER — CYCLOBENZAPRINE HYDROCHLORIDE 10 MG/1
1 TABLET, FILM COATED ORAL
Qty: 9 | Refills: 0
Start: 2019-05-30

## 2019-05-30 RX ADMIN — Medication 650 MILLIGRAM(S): at 15:55

## 2019-05-30 NOTE — ED PROVIDER NOTE - CLINICAL SUMMARY MEDICAL DECISION MAKING FREE TEXT BOX
no red flags, neuro exam unremarkable, pt is ambulatory in ed without complications, vss, afebrile, pt states feels better after meds pt driving home, will send muscle relaxer to pharmacy, pt has pcp she will fu with provided ortho spine fu, return precautions given, ok with dc
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
## 2019-06-01 ENCOUNTER — OUTPATIENT (OUTPATIENT)
Dept: OUTPATIENT SERVICES | Facility: HOSPITAL | Age: 49
LOS: 1 days | End: 2019-06-01
Payer: MEDICAID

## 2019-06-01 DIAGNOSIS — O00.9 ECTOPIC PREGNANCY, UNSPECIFIED: Chronic | ICD-10-CM

## 2019-06-01 PROCEDURE — G9001: CPT

## 2019-06-14 DIAGNOSIS — Z71.89 OTHER SPECIFIED COUNSELING: ICD-10-CM

## 2020-08-22 ENCOUNTER — EMERGENCY (EMERGENCY)
Facility: HOSPITAL | Age: 50
LOS: 0 days | Discharge: HOME | End: 2020-08-23
Attending: EMERGENCY MEDICINE
Payer: MEDICAID

## 2020-08-22 VITALS
OXYGEN SATURATION: 100 % | DIASTOLIC BLOOD PRESSURE: 69 MMHG | HEART RATE: 108 BPM | WEIGHT: 147.05 LBS | HEIGHT: 63 IN | SYSTOLIC BLOOD PRESSURE: 123 MMHG | RESPIRATION RATE: 18 BRPM

## 2020-08-22 DIAGNOSIS — Z87.59 PERSONAL HISTORY OF OTHER COMPLICATIONS OF PREGNANCY, CHILDBIRTH AND THE PUERPERIUM: ICD-10-CM

## 2020-08-22 DIAGNOSIS — E11.9 TYPE 2 DIABETES MELLITUS WITHOUT COMPLICATIONS: ICD-10-CM

## 2020-08-22 DIAGNOSIS — D64.9 ANEMIA, UNSPECIFIED: ICD-10-CM

## 2020-08-22 DIAGNOSIS — D25.9 LEIOMYOMA OF UTERUS, UNSPECIFIED: ICD-10-CM

## 2020-08-22 DIAGNOSIS — R42 DIZZINESS AND GIDDINESS: ICD-10-CM

## 2020-08-22 DIAGNOSIS — O00.9 ECTOPIC PREGNANCY, UNSPECIFIED: Chronic | ICD-10-CM

## 2020-08-22 DIAGNOSIS — R07.9 CHEST PAIN, UNSPECIFIED: ICD-10-CM

## 2020-08-22 DIAGNOSIS — Z88.8 ALLERGY STATUS TO OTHER DRUGS, MEDICAMENTS AND BIOLOGICAL SUBSTANCES STATUS: ICD-10-CM

## 2020-08-22 DIAGNOSIS — I10 ESSENTIAL (PRIMARY) HYPERTENSION: ICD-10-CM

## 2020-08-22 DIAGNOSIS — R55 SYNCOPE AND COLLAPSE: ICD-10-CM

## 2020-08-22 DIAGNOSIS — R11.2 NAUSEA WITH VOMITING, UNSPECIFIED: ICD-10-CM

## 2020-08-22 DIAGNOSIS — Z79.84 LONG TERM (CURRENT) USE OF ORAL HYPOGLYCEMIC DRUGS: ICD-10-CM

## 2020-08-22 LAB
ALBUMIN SERPL ELPH-MCNC: 3.1 G/DL — LOW (ref 3.3–5)
ALP SERPL-CCNC: 48 U/L — SIGNIFICANT CHANGE UP (ref 40–120)
ALT FLD-CCNC: 19 U/L — SIGNIFICANT CHANGE UP (ref 12–78)
AMPHET UR-MCNC: NEGATIVE — SIGNIFICANT CHANGE UP
ANION GAP SERPL CALC-SCNC: 10 MMOL/L — SIGNIFICANT CHANGE UP (ref 5–17)
ANISOCYTOSIS BLD QL: SLIGHT — SIGNIFICANT CHANGE UP
APPEARANCE UR: CLEAR — SIGNIFICANT CHANGE UP
APTT BLD: 26.9 SEC — LOW (ref 27.5–35.5)
AST SERPL-CCNC: 16 U/L — SIGNIFICANT CHANGE UP (ref 15–37)
BARBITURATES UR SCN-MCNC: NEGATIVE — SIGNIFICANT CHANGE UP
BASOPHILS # BLD AUTO: 0.05 K/UL — SIGNIFICANT CHANGE UP (ref 0–0.2)
BASOPHILS NFR BLD AUTO: 0.7 % — SIGNIFICANT CHANGE UP (ref 0–2)
BENZODIAZ UR-MCNC: NEGATIVE — SIGNIFICANT CHANGE UP
BILIRUB SERPL-MCNC: 0.2 MG/DL — SIGNIFICANT CHANGE UP (ref 0.2–1.2)
BILIRUB UR-MCNC: NEGATIVE — SIGNIFICANT CHANGE UP
BUN SERPL-MCNC: 13 MG/DL — SIGNIFICANT CHANGE UP (ref 7–23)
CALCIUM SERPL-MCNC: 7.8 MG/DL — LOW (ref 8.5–10.1)
CHLORIDE SERPL-SCNC: 105 MMOL/L — SIGNIFICANT CHANGE UP (ref 96–108)
CK MB CFR SERPL CALC: 1.6 NG/ML — SIGNIFICANT CHANGE UP (ref 0.5–3.6)
CO2 SERPL-SCNC: 24 MMOL/L — SIGNIFICANT CHANGE UP (ref 22–31)
COCAINE METAB.OTHER UR-MCNC: NEGATIVE — SIGNIFICANT CHANGE UP
COLOR SPEC: YELLOW — SIGNIFICANT CHANGE UP
CREAT SERPL-MCNC: 1 MG/DL — SIGNIFICANT CHANGE UP (ref 0.5–1.3)
DACRYOCYTES BLD QL SMEAR: SLIGHT — SIGNIFICANT CHANGE UP
DIFF PNL FLD: ABNORMAL
EOSINOPHIL # BLD AUTO: 0.06 K/UL — SIGNIFICANT CHANGE UP (ref 0–0.5)
EOSINOPHIL NFR BLD AUTO: 0.8 % — SIGNIFICANT CHANGE UP (ref 0–6)
ETHANOL SERPL-MCNC: <10 MG/DL — SIGNIFICANT CHANGE UP (ref 0–10)
GLUCOSE BLDC GLUCOMTR-MCNC: 211 MG/DL — HIGH (ref 70–99)
GLUCOSE BLDC GLUCOMTR-MCNC: 220 MG/DL — HIGH (ref 70–99)
GLUCOSE SERPL-MCNC: 216 MG/DL — HIGH (ref 70–99)
GLUCOSE UR QL: NEGATIVE MG/DL — SIGNIFICANT CHANGE UP
HCG SERPL-ACNC: <1 MIU/ML — SIGNIFICANT CHANGE UP
HCT VFR BLD CALC: 29.2 % — LOW (ref 34.5–45)
HGB BLD-MCNC: 8.7 G/DL — LOW (ref 11.5–15.5)
HYPOCHROMIA BLD QL: SLIGHT — SIGNIFICANT CHANGE UP
IMM GRANULOCYTES NFR BLD AUTO: 0.3 % — SIGNIFICANT CHANGE UP (ref 0–1.5)
INR BLD: 1.06 RATIO — SIGNIFICANT CHANGE UP (ref 0.88–1.16)
KETONES UR-MCNC: NEGATIVE — SIGNIFICANT CHANGE UP
LEUKOCYTE ESTERASE UR-ACNC: NEGATIVE — SIGNIFICANT CHANGE UP
LYMPHOCYTES # BLD AUTO: 1.14 K/UL — SIGNIFICANT CHANGE UP (ref 1–3.3)
LYMPHOCYTES # BLD AUTO: 15.5 % — SIGNIFICANT CHANGE UP (ref 13–44)
MACROCYTES BLD QL: SLIGHT — SIGNIFICANT CHANGE UP
MANUAL SMEAR VERIFICATION: SIGNIFICANT CHANGE UP
MCHC RBC-ENTMCNC: 22.3 PG — LOW (ref 27–34)
MCHC RBC-ENTMCNC: 29.8 GM/DL — LOW (ref 32–36)
MCV RBC AUTO: 74.9 FL — LOW (ref 80–100)
METHADONE UR-MCNC: NEGATIVE — SIGNIFICANT CHANGE UP
MICROCYTES BLD QL: SLIGHT — SIGNIFICANT CHANGE UP
MONOCYTES # BLD AUTO: 0.39 K/UL — SIGNIFICANT CHANGE UP (ref 0–0.9)
MONOCYTES NFR BLD AUTO: 5.3 % — SIGNIFICANT CHANGE UP (ref 2–14)
NEUTROPHILS # BLD AUTO: 5.71 K/UL — SIGNIFICANT CHANGE UP (ref 1.8–7.4)
NEUTROPHILS NFR BLD AUTO: 77.4 % — HIGH (ref 43–77)
NITRITE UR-MCNC: NEGATIVE — SIGNIFICANT CHANGE UP
NRBC # BLD: 0 /100 WBCS — SIGNIFICANT CHANGE UP (ref 0–0)
OPIATES UR-MCNC: NEGATIVE — SIGNIFICANT CHANGE UP
OVALOCYTES BLD QL SMEAR: SLIGHT — SIGNIFICANT CHANGE UP
PCP SPEC-MCNC: SIGNIFICANT CHANGE UP
PCP UR-MCNC: NEGATIVE — SIGNIFICANT CHANGE UP
PH UR: 5 — SIGNIFICANT CHANGE UP (ref 5–8)
PLAT MORPH BLD: NORMAL — SIGNIFICANT CHANGE UP
PLATELET # BLD AUTO: 250 K/UL — SIGNIFICANT CHANGE UP (ref 150–400)
POTASSIUM SERPL-MCNC: 3.4 MMOL/L — LOW (ref 3.5–5.3)
POTASSIUM SERPL-SCNC: 3.4 MMOL/L — LOW (ref 3.5–5.3)
PROT SERPL-MCNC: 6.7 GM/DL — SIGNIFICANT CHANGE UP (ref 6–8.3)
PROT UR-MCNC: 15 MG/DL
PROTHROM AB SERPL-ACNC: 12.3 SEC — SIGNIFICANT CHANGE UP (ref 10.6–13.6)
RBC # BLD: 3.9 M/UL — SIGNIFICANT CHANGE UP (ref 3.8–5.2)
RBC # FLD: 16.1 % — HIGH (ref 10.3–14.5)
RBC BLD AUTO: ABNORMAL
SODIUM SERPL-SCNC: 139 MMOL/L — SIGNIFICANT CHANGE UP (ref 135–145)
SP GR SPEC: 1.02 — SIGNIFICANT CHANGE UP (ref 1.01–1.02)
THC UR QL: POSITIVE — SIGNIFICANT CHANGE UP
TROPONIN I SERPL-MCNC: <.015 NG/ML — SIGNIFICANT CHANGE UP (ref 0.01–0.04)
UROBILINOGEN FLD QL: NEGATIVE MG/DL — SIGNIFICANT CHANGE UP
WBC # BLD: 7.37 K/UL — SIGNIFICANT CHANGE UP (ref 3.8–10.5)
WBC # FLD AUTO: 7.37 K/UL — SIGNIFICANT CHANGE UP (ref 3.8–10.5)

## 2020-08-22 PROCEDURE — 71045 X-RAY EXAM CHEST 1 VIEW: CPT | Mod: 26

## 2020-08-22 PROCEDURE — 99285 EMERGENCY DEPT VISIT HI MDM: CPT

## 2020-08-22 PROCEDURE — 93010 ELECTROCARDIOGRAM REPORT: CPT

## 2020-08-22 RX ORDER — SODIUM CHLORIDE 9 MG/ML
1000 INJECTION INTRAMUSCULAR; INTRAVENOUS; SUBCUTANEOUS ONCE
Refills: 0 | Status: COMPLETED | OUTPATIENT
Start: 2020-08-22 | End: 2020-08-22

## 2020-08-22 RX ORDER — MECLIZINE HCL 12.5 MG
50 TABLET ORAL ONCE
Refills: 0 | Status: COMPLETED | OUTPATIENT
Start: 2020-08-22 | End: 2020-08-22

## 2020-08-22 RX ORDER — FAMOTIDINE 10 MG/ML
20 INJECTION INTRAVENOUS ONCE
Refills: 0 | Status: COMPLETED | OUTPATIENT
Start: 2020-08-22 | End: 2020-08-22

## 2020-08-22 RX ORDER — POTASSIUM CHLORIDE 20 MEQ
40 PACKET (EA) ORAL ONCE
Refills: 0 | Status: COMPLETED | OUTPATIENT
Start: 2020-08-22 | End: 2020-08-22

## 2020-08-22 RX ORDER — METOCLOPRAMIDE HCL 10 MG
10 TABLET ORAL ONCE
Refills: 0 | Status: COMPLETED | OUTPATIENT
Start: 2020-08-22 | End: 2020-08-22

## 2020-08-22 RX ADMIN — FAMOTIDINE 20 MILLIGRAM(S): 10 INJECTION INTRAVENOUS at 22:09

## 2020-08-22 RX ADMIN — Medication 40 MILLIEQUIVALENT(S): at 22:10

## 2020-08-22 RX ADMIN — Medication 50 MILLIGRAM(S): at 22:11

## 2020-08-22 RX ADMIN — SODIUM CHLORIDE 1000 MILLILITER(S): 9 INJECTION INTRAMUSCULAR; INTRAVENOUS; SUBCUTANEOUS at 23:15

## 2020-08-22 RX ADMIN — Medication 10 MILLIGRAM(S): at 22:36

## 2020-08-22 RX ADMIN — SODIUM CHLORIDE 1000 MILLILITER(S): 9 INJECTION INTRAMUSCULAR; INTRAVENOUS; SUBCUTANEOUS at 22:07

## 2020-08-22 NOTE — ED PROVIDER NOTE - OBJECTIVE STATEMENT
51 yo F with chest pain and lightheadedness since 7 pm (about 2 hours now).  Pt. was outside drinking champagne and socializing, then the chest tightness (central, non-radiating) and dizziness hit.  It's been more or less constant since.  Pt. felt nauseas while in ER just now, almost vomited.  She also feels sleepy.  No other complaints.   ROS: negative for fever, cough, headache, shortness of breath, abd pain, nausea, vomiting, diarrhea, rash, paresthesia, and weakness--all other systems reviewed are negative.   PMH: DM, HTN, fibroids, iron def anemia; Meds: metformin, amlodipine, ferrous sulfate; SH: Denies smoking/drinking/drug use

## 2020-08-22 NOTE — ED PROVIDER NOTE - INTERPRETATION
EKG performed in ED, NSR at 96, No acute ischemic changes (non-specific tw changes present however), normal intervals

## 2020-08-22 NOTE — ED PROVIDER NOTE - CARE PLAN
Principal Discharge DX:	Anemia, unspecified type Principal Discharge DX:	Anemia, unspecified type  Secondary Diagnosis:	Pre-syncope

## 2020-08-22 NOTE — ED PROVIDER NOTE - CARE PROVIDER_API CALL
Felix Thornton  OBSTETRICS AND GYNECOLOGY  [address] [id number]  Phone: [phone]  Fax: [phone]  Follow Up Time: 4-6 Days

## 2020-08-22 NOTE — ED PROVIDER NOTE - CLINICAL SUMMARY MEDICAL DECISION MAKING FREE TEXT BOX
51 yo F with chest tightness, dizziness, sleepiness, likely vertigo, doubt acs, pregnancy, uti, drug intox  -basic labs, coags, etoh, tro, ckmb, ua, cx, drug screen, hcg, finger stick, CXR, ekg, iv, monitor, pepcid, reglan, meclizine, ns hydration bolus  -f/u results, reeval

## 2020-08-22 NOTE — ED PROVIDER NOTE - PATIENT PORTAL LINK FT
You can access the FollowMyHealth Patient Portal offered by [facility] by registering at the following website: [email or web address]. By joining [employer]’s FollowMyHealth portal, you will also be able to view your health information using other applications (apps) compatible with our system.

## 2020-08-22 NOTE — ED PROVIDER NOTE - PROGRESS NOTE
-- Message is from the [facility]--    Reason for Call: Patient called in to let the provider know the referral she was sent for physical therapy does not accept her insurance. Please contact patient.     Caller Information       Type Contact Phone    05/07/2019 05:20 PM Phone (Incoming) Celina العلي (Self) [phone] (H)          Alternative phone number: NA     Turnaround time given to caller:   \"This message will be sent to [state Provider's name]. The clinical team will fulfill your request as soon as they review your message when the office opens tomorrow.\"    
Spoke with pt and advised of need to contact insurance company to find out what PT is covered     Pt advised of need to contact the office with PT name, number and address     Pt verbalized understanding   
Improved.

## 2020-08-22 NOTE — ED PROVIDER NOTE - PROGRESS NOTE DETAILS
Results reported to patient--anemia but not requiring transfusion, other labs wnl  Pt. reports feeling better after meds, presyncope likely from anemia  pt. agrees to f/u with primary care outpt., referred to ob/gyn for f/u as well  pt. understands to return to ED if symptoms worsen; will d/c

## 2020-08-23 VITALS
DIASTOLIC BLOOD PRESSURE: 56 MMHG | TEMPERATURE: 98 F | HEART RATE: 89 BPM | OXYGEN SATURATION: 100 % | RESPIRATION RATE: 17 BRPM | SYSTOLIC BLOOD PRESSURE: 101 MMHG

## 2020-08-23 LAB
BACTERIA # UR AUTO: ABNORMAL
EPI CELLS # UR: SIGNIFICANT CHANGE UP
RBC CASTS # UR COMP ASSIST: >50 /HPF (ref 0–4)
WBC UR QL: SIGNIFICANT CHANGE UP

## 2020-08-24 LAB
CULTURE RESULTS: SIGNIFICANT CHANGE UP
SPECIMEN SOURCE: SIGNIFICANT CHANGE UP

## 2021-01-26 NOTE — ED ADULT TRIAGE NOTE - RESPIRATORY RATE (BREATHS/MIN)
From: Nallely Ulloa  To: Duran Dempsey MD  Sent: 1/26/2021 3:33 PM EST  Subject: Non-Urgent Medical Question    I left my FMLA papers at the front office desk when I picked up my note for my boss. Today has not been a good day. 18

## 2021-09-16 ENCOUNTER — EMERGENCY (EMERGENCY)
Facility: HOSPITAL | Age: 51
LOS: 0 days | Discharge: ANOTHER HOSPITAL | End: 2021-09-17
Attending: EMERGENCY MEDICINE
Payer: MEDICAID

## 2021-09-16 VITALS
WEIGHT: 149.91 LBS | HEART RATE: 106 BPM | SYSTOLIC BLOOD PRESSURE: 122 MMHG | HEIGHT: 63 IN | TEMPERATURE: 99 F | OXYGEN SATURATION: 99 % | DIASTOLIC BLOOD PRESSURE: 79 MMHG | RESPIRATION RATE: 20 BRPM

## 2021-09-16 DIAGNOSIS — K75.9 INFLAMMATORY LIVER DISEASE, UNSPECIFIED: ICD-10-CM

## 2021-09-16 DIAGNOSIS — R74.01 ELEVATION OF LEVELS OF LIVER TRANSAMINASE LEVELS: ICD-10-CM

## 2021-09-16 DIAGNOSIS — Z87.19 PERSONAL HISTORY OF OTHER DISEASES OF THE DIGESTIVE SYSTEM: ICD-10-CM

## 2021-09-16 DIAGNOSIS — R16.0 HEPATOMEGALY, NOT ELSEWHERE CLASSIFIED: ICD-10-CM

## 2021-09-16 DIAGNOSIS — E80.7 DISORDER OF BILIRUBIN METABOLISM, UNSPECIFIED: ICD-10-CM

## 2021-09-16 DIAGNOSIS — I10 ESSENTIAL (PRIMARY) HYPERTENSION: ICD-10-CM

## 2021-09-16 DIAGNOSIS — Z79.84 LONG TERM (CURRENT) USE OF ORAL HYPOGLYCEMIC DRUGS: ICD-10-CM

## 2021-09-16 DIAGNOSIS — R53.81 OTHER MALAISE: ICD-10-CM

## 2021-09-16 DIAGNOSIS — R17 UNSPECIFIED JAUNDICE: ICD-10-CM

## 2021-09-16 DIAGNOSIS — Z86.2 PERSONAL HISTORY OF DISEASES OF THE BLOOD AND BLOOD-FORMING ORGANS AND CERTAIN DISORDERS INVOLVING THE IMMUNE MECHANISM: ICD-10-CM

## 2021-09-16 DIAGNOSIS — N39.0 URINARY TRACT INFECTION, SITE NOT SPECIFIED: ICD-10-CM

## 2021-09-16 DIAGNOSIS — Z88.8 ALLERGY STATUS TO OTHER DRUGS, MEDICAMENTS AND BIOLOGICAL SUBSTANCES: ICD-10-CM

## 2021-09-16 DIAGNOSIS — O00.9 ECTOPIC PREGNANCY, UNSPECIFIED: Chronic | ICD-10-CM

## 2021-09-16 DIAGNOSIS — Z20.822 CONTACT WITH AND (SUSPECTED) EXPOSURE TO COVID-19: ICD-10-CM

## 2021-09-16 DIAGNOSIS — Z87.42 PERSONAL HISTORY OF OTHER DISEASES OF THE FEMALE GENITAL TRACT: ICD-10-CM

## 2021-09-16 DIAGNOSIS — E11.9 TYPE 2 DIABETES MELLITUS WITHOUT COMPLICATIONS: ICD-10-CM

## 2021-09-16 DIAGNOSIS — R50.9 FEVER, UNSPECIFIED: ICD-10-CM

## 2021-09-16 LAB
ALBUMIN SERPL ELPH-MCNC: 2.9 G/DL — LOW (ref 3.3–5)
ALP SERPL-CCNC: 269 U/L — HIGH (ref 40–120)
ALT FLD-CCNC: 1780 U/L — HIGH (ref 12–78)
ANION GAP SERPL CALC-SCNC: 6 MMOL/L — SIGNIFICANT CHANGE UP (ref 5–17)
ANISOCYTOSIS BLD QL: SLIGHT — SIGNIFICANT CHANGE UP
APAP SERPL-MCNC: < 2 UG/ML (ref 10–30)
APPEARANCE UR: ABNORMAL
AST SERPL-CCNC: 1851 U/L — HIGH (ref 15–37)
BACTERIA # UR AUTO: ABNORMAL
BASOPHILS # BLD AUTO: 0.05 K/UL — SIGNIFICANT CHANGE UP (ref 0–0.2)
BASOPHILS NFR BLD AUTO: 1 % — SIGNIFICANT CHANGE UP (ref 0–2)
BILIRUB DIRECT SERPL-MCNC: 7.1 MG/DL — HIGH (ref 0.05–0.2)
BILIRUB INDIRECT FLD-MCNC: 1.4 MG/DL — HIGH (ref 0.2–1)
BILIRUB SERPL-MCNC: 8.5 MG/DL — HIGH (ref 0.2–1.2)
BILIRUB SERPL-MCNC: 9.7 MG/DL — HIGH (ref 0.2–1.2)
BILIRUB UR-MCNC: ABNORMAL
BUN SERPL-MCNC: 7 MG/DL — SIGNIFICANT CHANGE UP (ref 7–23)
CALCIUM SERPL-MCNC: 9 MG/DL — SIGNIFICANT CHANGE UP (ref 8.5–10.1)
CHLORIDE SERPL-SCNC: 100 MMOL/L — SIGNIFICANT CHANGE UP (ref 96–108)
CK SERPL-CCNC: 52 U/L — SIGNIFICANT CHANGE UP (ref 26–192)
CO2 SERPL-SCNC: 28 MMOL/L — SIGNIFICANT CHANGE UP (ref 22–31)
COLOR SPEC: ABNORMAL
CREAT SERPL-MCNC: 0.81 MG/DL — SIGNIFICANT CHANGE UP (ref 0.5–1.3)
DIFF PNL FLD: ABNORMAL
EOSINOPHIL # BLD AUTO: 0.1 K/UL — SIGNIFICANT CHANGE UP (ref 0–0.5)
EOSINOPHIL NFR BLD AUTO: 2 % — SIGNIFICANT CHANGE UP (ref 0–6)
EPI CELLS # UR: ABNORMAL
FLUAV AG NPH QL: SIGNIFICANT CHANGE UP
FLUBV AG NPH QL: SIGNIFICANT CHANGE UP
GLUCOSE SERPL-MCNC: 183 MG/DL — HIGH (ref 70–99)
GLUCOSE UR QL: NEGATIVE MG/DL — SIGNIFICANT CHANGE UP
HCG SERPL-ACNC: <1 MIU/ML — SIGNIFICANT CHANGE UP
HCT VFR BLD CALC: 31.6 % — LOW (ref 34.5–45)
HGB BLD-MCNC: 10.4 G/DL — LOW (ref 11.5–15.5)
HYPOCHROMIA BLD QL: SLIGHT — SIGNIFICANT CHANGE UP
KETONES UR-MCNC: ABNORMAL
LEUKOCYTE ESTERASE UR-ACNC: ABNORMAL
LYMPHOCYTES # BLD AUTO: 2.08 K/UL — SIGNIFICANT CHANGE UP (ref 1–3.3)
LYMPHOCYTES # BLD AUTO: 43 % — SIGNIFICANT CHANGE UP (ref 13–44)
MACROCYTES BLD QL: SLIGHT — SIGNIFICANT CHANGE UP
MAGNESIUM SERPL-MCNC: 1.9 MG/DL — SIGNIFICANT CHANGE UP (ref 1.6–2.6)
MCHC RBC-ENTMCNC: 21.7 PG — LOW (ref 27–34)
MCHC RBC-ENTMCNC: 32.9 GM/DL — SIGNIFICANT CHANGE UP (ref 32–36)
MCV RBC AUTO: 65.8 FL — LOW (ref 80–100)
MICROCYTES BLD QL: SLIGHT — SIGNIFICANT CHANGE UP
MONOCYTES # BLD AUTO: 0.34 K/UL — SIGNIFICANT CHANGE UP (ref 0–0.9)
MONOCYTES NFR BLD AUTO: 7 % — SIGNIFICANT CHANGE UP (ref 2–14)
NEUTROPHILS # BLD AUTO: 2.03 K/UL — SIGNIFICANT CHANGE UP (ref 1.8–7.4)
NEUTROPHILS NFR BLD AUTO: 37 % — LOW (ref 43–77)
NEUTS BAND # BLD: 5 % — SIGNIFICANT CHANGE UP (ref 0–8)
NITRITE UR-MCNC: POSITIVE
NRBC # BLD: 0 /100 — SIGNIFICANT CHANGE UP (ref 0–0)
NRBC # BLD: SIGNIFICANT CHANGE UP /100 WBCS (ref 0–0)
OVALOCYTES BLD QL SMEAR: SLIGHT — SIGNIFICANT CHANGE UP
PH UR: 5 — SIGNIFICANT CHANGE UP (ref 5–8)
PLAT MORPH BLD: NORMAL — SIGNIFICANT CHANGE UP
PLATELET # BLD AUTO: 236 K/UL — SIGNIFICANT CHANGE UP (ref 150–400)
POTASSIUM SERPL-MCNC: 4.1 MMOL/L — SIGNIFICANT CHANGE UP (ref 3.5–5.3)
POTASSIUM SERPL-SCNC: 4.1 MMOL/L — SIGNIFICANT CHANGE UP (ref 3.5–5.3)
PROT SERPL-MCNC: 7.1 GM/DL — SIGNIFICANT CHANGE UP (ref 6–8.3)
PROT UR-MCNC: 100 MG/DL
RBC # BLD: 4.8 M/UL — SIGNIFICANT CHANGE UP (ref 3.8–5.2)
RBC # FLD: 17.9 % — HIGH (ref 10.3–14.5)
RBC BLD AUTO: ABNORMAL
RBC CASTS # UR COMP ASSIST: >50 /HPF (ref 0–4)
SARS-COV-2 RNA SPEC QL NAA+PROBE: SIGNIFICANT CHANGE UP
SODIUM SERPL-SCNC: 134 MMOL/L — LOW (ref 135–145)
SP GR SPEC: 1.02 — SIGNIFICANT CHANGE UP (ref 1.01–1.02)
TARGETS BLD QL SMEAR: SLIGHT — SIGNIFICANT CHANGE UP
TROPONIN I SERPL-MCNC: <.015 NG/ML — SIGNIFICANT CHANGE UP (ref 0.01–0.04)
UROBILINOGEN FLD QL: 8 MG/DL
VARIANT LYMPHS # BLD: 5 % — SIGNIFICANT CHANGE UP (ref 0–6)
WBC # BLD: 4.84 K/UL — SIGNIFICANT CHANGE UP (ref 3.8–10.5)
WBC # FLD AUTO: 4.84 K/UL — SIGNIFICANT CHANGE UP (ref 3.8–10.5)
WBC UR QL: SIGNIFICANT CHANGE UP

## 2021-09-16 PROCEDURE — 76705 ECHO EXAM OF ABDOMEN: CPT | Mod: 26

## 2021-09-16 PROCEDURE — 99285 EMERGENCY DEPT VISIT HI MDM: CPT

## 2021-09-16 PROCEDURE — 71045 X-RAY EXAM CHEST 1 VIEW: CPT | Mod: 26

## 2021-09-16 RX ORDER — SODIUM CHLORIDE 9 MG/ML
1000 INJECTION INTRAMUSCULAR; INTRAVENOUS; SUBCUTANEOUS ONCE
Refills: 0 | Status: COMPLETED | OUTPATIENT
Start: 2021-09-16 | End: 2021-09-16

## 2021-09-16 RX ORDER — CEFTRIAXONE 500 MG/1
1000 INJECTION, POWDER, FOR SOLUTION INTRAMUSCULAR; INTRAVENOUS ONCE
Refills: 0 | Status: COMPLETED | OUTPATIENT
Start: 2021-09-16 | End: 2021-09-16

## 2021-09-16 RX ADMIN — CEFTRIAXONE 1000 MILLIGRAM(S): 500 INJECTION, POWDER, FOR SOLUTION INTRAMUSCULAR; INTRAVENOUS at 22:36

## 2021-09-16 RX ADMIN — SODIUM CHLORIDE 1000 MILLILITER(S): 9 INJECTION INTRAMUSCULAR; INTRAVENOUS; SUBCUTANEOUS at 22:36

## 2021-09-16 RX ADMIN — SODIUM CHLORIDE 1000 MILLILITER(S): 9 INJECTION INTRAMUSCULAR; INTRAVENOUS; SUBCUTANEOUS at 20:26

## 2021-09-16 RX ADMIN — CEFTRIAXONE 100 MILLIGRAM(S): 500 INJECTION, POWDER, FOR SOLUTION INTRAMUSCULAR; INTRAVENOUS at 21:44

## 2021-09-16 NOTE — ED PROVIDER NOTE - OBJECTIVE STATEMENT
Pertinent PMH/PSH/FHx/SHx and Review of Systems contained within:  Patient presents to the ED for medical evaluation.  Patient states that for a few days now she's been feeling run down and has noted temps around 99F taken orally which she feels is a low grade fever.  She started feeling ill after visiting [facility] for a social visit.  She describes generalized malaise and today noted that her sclera appeared more yellow than usual.  She denies any abdominal pains, fevers, nausea, vomiting, diarrhea.  She says that her chest has been uncomfortable but her symptoms are the same as when she has acid reflux.  Patient also feels that "my urine is very yellow!"  She went to see her physician at [facility] on Monday, had CBC and BMP which were normal.  She denies any history of alcohol abuse, any new medication changes, and has not been covid vaccinated yet.  Of note, patient looks great, is not at all ill appearing.     Relevant PMHx/SHx/SOCHx/FAMH:  HTN, DM, anemia, acid reflux  Patient denies EtOH/tobacco/illicit substance use.    ROS: No fever/chills, No headache/photophobia/eye pain/changes in vision, No ear pain/sore throat/dysphagia, No chest pain/palpitations, no SOB/cough/wheeze/stridor, No abdominal pain, No N/V/D/melena, no dysuria/frequency/discharge, No neck/back pain, no rash, no changes in neurological status/function.

## 2021-09-16 NOTE — ED ADULT NURSE NOTE - OBJECTIVE STATEMENT
Pt alert and oriented present to ed with yellow sclera, and c/o low grade fever for the past 4 day Pt denies any dizziness, chest pain, blurry vision, dysuria, sob h/o dm, htn

## 2021-09-16 NOTE — ED PROVIDER NOTE - CARE PLAN
Principal Discharge DX:	Hepatitis  Secondary Diagnosis:	Jaundice  Secondary Diagnosis:	Urinary tract infection   1

## 2021-09-16 NOTE — ED ADULT NURSE NOTE - ED STAT RN HANDOFF DETAILS
Report given to Santo BASSETT. Patient A&Ox4. Patient has IV access on the right AC 18g. Patient in no acute distress, patient denies pain and discomfort. Patient being transferred for gastro consult. Patient has no stat orders pending, patient received all medications with no adverse reactions noted. All cocnerns endorsed to oncoming RN.

## 2021-09-16 NOTE — ED PROVIDER NOTE - CLINICAL SUMMARY MEDICAL DECISION MAKING FREE TEXT BOX
no vascular compromise Patient with new onset painless jaundice and hepatomegaly with gb wall thickening on US.  VSS.  Labs with markedly elevated LFTs.  GEn surg consult obtained, recommend possible ERCP/MRCP.  Discussed admission with hopitalist, however informed that GI no longer handling hepatobiliary cases at [facility], therefore patient will require transfer to higher level of care.  UA treated with rocephin. Hepatitis panel, legionella, and mono pending.  Patient consents to transfer to [facility].

## 2021-09-16 NOTE — ED PROVIDER NOTE - PHYSICAL EXAMINATION
Gen: Alert, NAD, well appearing  Head: NC, AT, PERRL, EOMI, normal lids, slightly yellow appearing conjunctiva  ENT: normal hearing, patent oropharynx without erythema/exudate, uvula midline  Neck: +supple, no tenderness/meningismus/JVD, +Trachea midline  Pulm: Bilateral BS, normal resp effort, no wheeze/stridor/retractions  CV: RRR, no M/R/G, +dist pulses  Abd: soft, NT/ND, Negative Medina signs, +BS, no palpable masses  Mskel: no edema/erythema/cyanosis  Skin: no rash, warm/dry  Neuro: AAOx3, no apparent sensory/motor deficits, coordination intact

## 2021-09-16 NOTE — ED ADULT NURSE NOTE - NSICDXPASTMEDICALHX_GEN_ALL_CORE_FT
PAST MEDICAL HISTORY:  DM (diabetes mellitus)     HTN (hypertension)     Iron deficiency anemia     Iron deficiency anemia

## 2021-09-17 ENCOUNTER — INPATIENT (INPATIENT)
Facility: HOSPITAL | Age: 51
LOS: 6 days | Discharge: HOME | End: 2021-09-24
Attending: INTERNAL MEDICINE | Admitting: INTERNAL MEDICINE
Payer: MEDICAID

## 2021-09-17 VITALS
SYSTOLIC BLOOD PRESSURE: 104 MMHG | DIASTOLIC BLOOD PRESSURE: 70 MMHG | OXYGEN SATURATION: 100 % | TEMPERATURE: 99 F | HEART RATE: 94 BPM | RESPIRATION RATE: 18 BRPM

## 2021-09-17 VITALS
HEIGHT: 63 IN | HEART RATE: 100 BPM | RESPIRATION RATE: 16 BRPM | DIASTOLIC BLOOD PRESSURE: 69 MMHG | SYSTOLIC BLOOD PRESSURE: 104 MMHG | TEMPERATURE: 99 F | OXYGEN SATURATION: 100 %

## 2021-09-17 DIAGNOSIS — D50.9 IRON DEFICIENCY ANEMIA, UNSPECIFIED: ICD-10-CM

## 2021-09-17 DIAGNOSIS — E11.9 TYPE 2 DIABETES MELLITUS WITHOUT COMPLICATIONS: ICD-10-CM

## 2021-09-17 DIAGNOSIS — B17.9 ACUTE VIRAL HEPATITIS, UNSPECIFIED: ICD-10-CM

## 2021-09-17 DIAGNOSIS — Z98.82 BREAST IMPLANT STATUS: Chronic | ICD-10-CM

## 2021-09-17 DIAGNOSIS — I10 ESSENTIAL (PRIMARY) HYPERTENSION: ICD-10-CM

## 2021-09-17 DIAGNOSIS — Z29.9 ENCOUNTER FOR PROPHYLACTIC MEASURES, UNSPECIFIED: ICD-10-CM

## 2021-09-17 DIAGNOSIS — R17 UNSPECIFIED JAUNDICE: ICD-10-CM

## 2021-09-17 DIAGNOSIS — O00.9 ECTOPIC PREGNANCY, UNSPECIFIED: Chronic | ICD-10-CM

## 2021-09-17 DIAGNOSIS — R82.71 BACTERIURIA: ICD-10-CM

## 2021-09-17 LAB
ALBUMIN SERPL ELPH-MCNC: 3 G/DL — LOW (ref 3.3–5)
ALBUMIN SERPL ELPH-MCNC: 3.1 G/DL — LOW (ref 3.3–5)
ALP SERPL-CCNC: 221 U/L — HIGH (ref 40–120)
ALP SERPL-CCNC: 223 U/L — HIGH (ref 40–120)
ALT FLD-CCNC: 1253 U/L — HIGH (ref 4–33)
ALT FLD-CCNC: 1261 U/L — HIGH (ref 4–33)
ANION GAP SERPL CALC-SCNC: 8 MMOL/L — SIGNIFICANT CHANGE UP (ref 7–14)
ANION GAP SERPL CALC-SCNC: 9 MMOL/L — SIGNIFICANT CHANGE UP (ref 7–14)
APTT BLD: 38 SEC — HIGH (ref 27.5–35.5)
AST SERPL-CCNC: 1366 U/L — HIGH (ref 4–32)
AST SERPL-CCNC: 1539 U/L — HIGH (ref 4–32)
BILIRUB SERPL-MCNC: 8.3 MG/DL — HIGH (ref 0.2–1.2)
BILIRUB SERPL-MCNC: 8.9 MG/DL — HIGH (ref 0.2–1.2)
BUN SERPL-MCNC: 4 MG/DL — LOW (ref 7–23)
BUN SERPL-MCNC: 5 MG/DL — LOW (ref 7–23)
CALCIUM SERPL-MCNC: 8.2 MG/DL — LOW (ref 8.4–10.5)
CALCIUM SERPL-MCNC: 8.3 MG/DL — LOW (ref 8.4–10.5)
CHLORIDE SERPL-SCNC: 102 MMOL/L — SIGNIFICANT CHANGE UP (ref 98–107)
CHLORIDE SERPL-SCNC: 99 MMOL/L — SIGNIFICANT CHANGE UP (ref 98–107)
CO2 SERPL-SCNC: 25 MMOL/L — SIGNIFICANT CHANGE UP (ref 22–31)
CO2 SERPL-SCNC: 27 MMOL/L — SIGNIFICANT CHANGE UP (ref 22–31)
CREAT SERPL-MCNC: 0.61 MG/DL — SIGNIFICANT CHANGE UP (ref 0.5–1.3)
CREAT SERPL-MCNC: 0.65 MG/DL — SIGNIFICANT CHANGE UP (ref 0.5–1.3)
GLUCOSE BLDC GLUCOMTR-MCNC: 128 MG/DL — HIGH (ref 70–99)
GLUCOSE SERPL-MCNC: 188 MG/DL — HIGH (ref 70–99)
GLUCOSE SERPL-MCNC: 210 MG/DL — HIGH (ref 70–99)
HAV IGM SER-ACNC: SIGNIFICANT CHANGE UP
HBV CORE IGM SER-ACNC: REACTIVE
HBV SURFACE AG SER-ACNC: REACTIVE
HCV AB S/CO SERPL IA: 0.13 S/CO — SIGNIFICANT CHANGE UP (ref 0–0.99)
HCV AB SERPL-IMP: SIGNIFICANT CHANGE UP
HETEROPH AB TITR SER AGGL: NEGATIVE — SIGNIFICANT CHANGE UP
INR BLD: 1.34 RATIO — HIGH (ref 0.88–1.16)
LACTATE SERPL-SCNC: 1.4 MMOL/L — SIGNIFICANT CHANGE UP (ref 0.5–2)
LEGIONELLA AG UR QL: NEGATIVE — SIGNIFICANT CHANGE UP
LIDOCAIN IGE QN: 50 U/L — SIGNIFICANT CHANGE UP (ref 7–60)
MAGNESIUM SERPL-MCNC: 2 MG/DL — SIGNIFICANT CHANGE UP (ref 1.6–2.6)
PHOSPHATE SERPL-MCNC: 2.8 MG/DL — SIGNIFICANT CHANGE UP (ref 2.5–4.5)
POTASSIUM SERPL-MCNC: 4 MMOL/L — SIGNIFICANT CHANGE UP (ref 3.5–5.3)
POTASSIUM SERPL-MCNC: 4.1 MMOL/L — SIGNIFICANT CHANGE UP (ref 3.5–5.3)
POTASSIUM SERPL-SCNC: 4 MMOL/L — SIGNIFICANT CHANGE UP (ref 3.5–5.3)
POTASSIUM SERPL-SCNC: 4.1 MMOL/L — SIGNIFICANT CHANGE UP (ref 3.5–5.3)
PROT SERPL-MCNC: 5.7 G/DL — LOW (ref 6–8.3)
PROT SERPL-MCNC: 5.9 G/DL — LOW (ref 6–8.3)
PROTHROM AB SERPL-ACNC: 15.3 SEC — HIGH (ref 10.6–13.6)
SODIUM SERPL-SCNC: 133 MMOL/L — LOW (ref 135–145)
SODIUM SERPL-SCNC: 137 MMOL/L — SIGNIFICANT CHANGE UP (ref 135–145)

## 2021-09-17 PROCEDURE — 99285 EMERGENCY DEPT VISIT HI MDM: CPT

## 2021-09-17 PROCEDURE — 74177 CT ABD & PELVIS W/CONTRAST: CPT | Mod: 26

## 2021-09-17 PROCEDURE — 93010 ELECTROCARDIOGRAM REPORT: CPT

## 2021-09-17 PROCEDURE — 99223 1ST HOSP IP/OBS HIGH 75: CPT | Mod: GC

## 2021-09-17 RX ORDER — CEFTRIAXONE 500 MG/1
1000 INJECTION, POWDER, FOR SOLUTION INTRAMUSCULAR; INTRAVENOUS EVERY 24 HOURS
Refills: 0 | Status: DISCONTINUED | OUTPATIENT
Start: 2021-09-17 | End: 2021-09-18

## 2021-09-17 RX ORDER — SODIUM CHLORIDE 9 MG/ML
1000 INJECTION INTRAMUSCULAR; INTRAVENOUS; SUBCUTANEOUS ONCE
Refills: 0 | Status: COMPLETED | OUTPATIENT
Start: 2021-09-17 | End: 2021-09-17

## 2021-09-17 RX ORDER — IBUPROFEN 200 MG
600 TABLET ORAL ONCE
Refills: 0 | Status: COMPLETED | OUTPATIENT
Start: 2021-09-17 | End: 2021-09-17

## 2021-09-17 RX ORDER — LANOLIN ALCOHOL/MO/W.PET/CERES
3 CREAM (GRAM) TOPICAL AT BEDTIME
Refills: 0 | Status: DISCONTINUED | OUTPATIENT
Start: 2021-09-17 | End: 2021-09-17

## 2021-09-17 RX ORDER — FLUTICASONE PROPIONATE 50 MCG
2 SPRAY, SUSPENSION NASAL
Qty: 0 | Refills: 0 | DISCHARGE

## 2021-09-17 RX ORDER — DEXTROSE 50 % IN WATER 50 %
25 SYRINGE (ML) INTRAVENOUS ONCE
Refills: 0 | Status: DISCONTINUED | OUTPATIENT
Start: 2021-09-17 | End: 2021-09-24

## 2021-09-17 RX ORDER — METFORMIN HYDROCHLORIDE 850 MG/1
500 TABLET ORAL
Qty: 0 | Refills: 0 | DISCHARGE

## 2021-09-17 RX ORDER — INSULIN LISPRO 100/ML
VIAL (ML) SUBCUTANEOUS
Refills: 0 | Status: DISCONTINUED | OUTPATIENT
Start: 2021-09-17 | End: 2021-09-24

## 2021-09-17 RX ORDER — ACETAMINOPHEN 500 MG
650 TABLET ORAL EVERY 6 HOURS
Refills: 0 | Status: DISCONTINUED | OUTPATIENT
Start: 2021-09-17 | End: 2021-09-17

## 2021-09-17 RX ORDER — ENOXAPARIN SODIUM 100 MG/ML
40 INJECTION SUBCUTANEOUS DAILY
Refills: 0 | Status: DISCONTINUED | OUTPATIENT
Start: 2021-09-17 | End: 2021-09-24

## 2021-09-17 RX ORDER — ONDANSETRON 8 MG/1
4 TABLET, FILM COATED ORAL EVERY 8 HOURS
Refills: 0 | Status: DISCONTINUED | OUTPATIENT
Start: 2021-09-17 | End: 2021-09-17

## 2021-09-17 RX ORDER — INSULIN LISPRO 100/ML
VIAL (ML) SUBCUTANEOUS AT BEDTIME
Refills: 0 | Status: DISCONTINUED | OUTPATIENT
Start: 2021-09-17 | End: 2021-09-24

## 2021-09-17 RX ORDER — PIPERACILLIN AND TAZOBACTAM 4; .5 G/20ML; G/20ML
3.38 INJECTION, POWDER, LYOPHILIZED, FOR SOLUTION INTRAVENOUS ONCE
Refills: 0 | Status: COMPLETED | OUTPATIENT
Start: 2021-09-17 | End: 2021-09-17

## 2021-09-17 RX ORDER — INSULIN LISPRO 100/ML
VIAL (ML) SUBCUTANEOUS
Refills: 0 | Status: DISCONTINUED | OUTPATIENT
Start: 2021-09-17 | End: 2021-09-17

## 2021-09-17 RX ORDER — DEXTROSE 50 % IN WATER 50 %
12.5 SYRINGE (ML) INTRAVENOUS ONCE
Refills: 0 | Status: DISCONTINUED | OUTPATIENT
Start: 2021-09-17 | End: 2021-09-24

## 2021-09-17 RX ORDER — ZINC SULFATE TAB 220 MG (50 MG ZINC EQUIVALENT) 220 (50 ZN) MG
1 TAB ORAL
Qty: 0 | Refills: 0 | DISCHARGE

## 2021-09-17 RX ORDER — SODIUM CHLORIDE 9 MG/ML
1000 INJECTION, SOLUTION INTRAVENOUS
Refills: 0 | Status: DISCONTINUED | OUTPATIENT
Start: 2021-09-17 | End: 2021-09-24

## 2021-09-17 RX ORDER — INFLUENZA VIRUS VACCINE 15; 15; 15; 15 UG/.5ML; UG/.5ML; UG/.5ML; UG/.5ML
0.5 SUSPENSION INTRAMUSCULAR ONCE
Refills: 0 | Status: DISCONTINUED | OUTPATIENT
Start: 2021-09-17 | End: 2021-09-24

## 2021-09-17 RX ORDER — DEXTROSE 50 % IN WATER 50 %
15 SYRINGE (ML) INTRAVENOUS ONCE
Refills: 0 | Status: DISCONTINUED | OUTPATIENT
Start: 2021-09-17 | End: 2021-09-24

## 2021-09-17 RX ORDER — GLUCAGON INJECTION, SOLUTION 0.5 MG/.1ML
1 INJECTION, SOLUTION SUBCUTANEOUS ONCE
Refills: 0 | Status: DISCONTINUED | OUTPATIENT
Start: 2021-09-17 | End: 2021-09-24

## 2021-09-17 RX ADMIN — Medication 2: at 18:30

## 2021-09-17 RX ADMIN — Medication 600 MILLIGRAM(S): at 04:07

## 2021-09-17 RX ADMIN — PIPERACILLIN AND TAZOBACTAM 200 GRAM(S): 4; .5 INJECTION, POWDER, LYOPHILIZED, FOR SOLUTION INTRAVENOUS at 04:07

## 2021-09-17 RX ADMIN — Medication 600 MILLIGRAM(S): at 13:40

## 2021-09-17 RX ADMIN — ENOXAPARIN SODIUM 40 MILLIGRAM(S): 100 INJECTION SUBCUTANEOUS at 17:24

## 2021-09-17 RX ADMIN — SODIUM CHLORIDE 1000 MILLILITER(S): 9 INJECTION INTRAMUSCULAR; INTRAVENOUS; SUBCUTANEOUS at 04:07

## 2021-09-17 RX ADMIN — CEFTRIAXONE 100 MILLIGRAM(S): 500 INJECTION, POWDER, FOR SOLUTION INTRAMUSCULAR; INTRAVENOUS at 17:24

## 2021-09-17 NOTE — PHARMACOTHERAPY INTERVENTION NOTE - COMMENTS
Medication history verified with [facility]. Please call [place] [phone] if you have any questions.

## 2021-09-17 NOTE — ED PROVIDER NOTE - NS ED ROS FT
GENERAL: +malaise  EYES: +yellow eyes  HEENT: No trouble swallowing or speaking  CARDIAC: No chest pain  PULMONARY: No cough or SOB  GI: No abdominal pain, no nausea or no vomiting, no diarrhea or constipation  : +dark yellow urine  SKIN: No rashes  NEURO: No headache, no numbness  MSK: No joint pain  Otherwise as HPI or negative.

## 2021-09-17 NOTE — CONSULT NOTE ADULT - ATTENDING COMMENTS
A 51 year old woman with history of HTN, DM presented with low grade fever and jaundice to [facility] and transferred to [facility] for marked elevation of liver enzymes with hyperbilirubinemia, was seen for acute hepatitis.   She had decreased po intake, and fever seen by PCP, given ibuprofen and acetaminophen for 3 days, developed jaundice and presented at [facility] found to have high liver enzymes and jaundice, transferred to [facility] for further management.   She used protein shake supplement rarely, and once recently, had tattoo 8 months prior. Home meds are metformin, amlodipine, as well as Vit C and Zinc.  marked elevation of liver enzymes, AST/ALT 1851/1780, alk phos 269, T bili 9.7, with direct bili 7.1 which downtrended. RUQ US reported hepatomegaly,  biliary sludge, and GB wall thickening. CT A/P hepatomegaly w/ periportal edema and trace ascites, as well as uterine fibroids. She is not encephalopathic. INR was 1.34,  Assessment: acute hepatitis, acute hepatocellular injury with jaundice, likely viral hepatitis vs autoimmune hepatitis and less likely biliary tract obstruction and unlikely ischemic hepatopathy  Recommendations: workup for acute viral hepatitis as listed above by GI fellow, RAMSEY, trend liver tests, INR and neuro check q8h, check mg, phosphorus and lactate, avoid hepatotoxic agents, and continue rest of care per primary team. A 51 year old woman with history of HTN, DM presented with low grade fever and jaundice to [facility] and transferred to [facility] for marked elevation of liver enzymes with hyperbilirubinemia, was seen for acute hepatitis.   She had decreased po intake, and fever seen by PCP, given ibuprofen and acetaminophen for 3 days, developed jaundice and presented at [facility] found to have high liver enzymes and jaundice, transferred to [facility] for further management.   She used protein shake supplement rarely, and once recently, had tattoo 8 months prior. Home meds are metformin, amlodipine, as well as Vit C and Zinc.  marked elevation of liver enzymes, AST/ALT 1851/1780, alk phos 269, T bili 9.7, with direct bili 7.1 which downtrended. RUQ US reported hepatomegaly, and GB wall thickening. CT A/P hepatomegaly w/ periportal edema and trace ascites, as well as uterine fibroids. She is not encephalopathic. INR was 1.34,  Assessment: acute hepatitis, acute hepatocellular injury with jaundice, likely viral hepatitis vs autoimmune hepatitis and less likely biliary tract obstruction and unlikely ischemic hepatopathy  Recommendations: workup for acute viral hepatitis as listed above by GI fellow, RAMSEY, trend liver tests, INR and neuro check q8h, check mg, phosphorus and lactate, avoid hepatotoxic agents, and continue rest of care per primary team.

## 2021-09-17 NOTE — ED PROVIDER NOTE - PHYSICAL EXAMINATION
Gen: NAD, A&Ox4. Non-toxic appearing.  HEENT: Normocephalic and atraumatic. PERRL, EOMI, no nasal discharge, mucous membranes moist, +scleral icterus bilaterally.  CV: Regular rate and rhythm, +S1/S2, no M/R/G. No significant lower extremity edema. Radial and DP pulses present and symmetrical. Capillary refill less than 2 seconds.  Resp: Normal effort and rate. CTAB, no rales, rhonchi, or wheezes.  GI: Abdomen soft, non-distended, mildly TTP in epigastric area and RUQ with hepatomegaly. No masses appreciated. Bowel sounds present.  MSK: No open wounds and no bruising. No CVA tenderness bilaterally.  Neuro: Following commands, speaking in full sentences, moving extremities spontaneously  Psych: Appropriate mood, cooperative Gen: NAD, A&Ox4. Non-toxic appearing.  HEENT: Normocephalic and atraumatic. PERRL, EOMI, no nasal discharge, mucous membranes moist, +scleral icterus bilaterally.  CV: Regular rate and rhythm, normal S1/S2, no M/R/G. No significant lower extremity edema. Radial and DP pulses present and symmetrical. Capillary refill less than 2 seconds.  Resp: Normal effort and rate. CTAB, no rales, rhonchi, or wheezes.  GI: Abdomen soft, non-distended, +mildly TTP in epigastric area and RUQ with hepatomegaly. No masses appreciated. Bowel sounds present.  MSK: No open wounds and no bruising. No CVA tenderness bilaterally.  Neuro: Following commands, speaking in full sentences, moving extremities spontaneously  Psych: Appropriate mood, cooperative

## 2021-09-17 NOTE — ED PROVIDER NOTE - OBJECTIVE STATEMENT
The patient is a 51y Female with pmhx of HTN, DM, anemia, acid reflux complaining of painless jaundice. Patient states that for a few days now she's been feeling run down and has noted temps around 99F taken orally which she feels is a low grade fever. She started feeling ill after visiting [facility] for a social visit. She describes generalized malaise and today noted that her sclera appeared more yellow than usual. She denies any abdominal pains, fevers, nausea, vomiting, diarrhea.  She says that her chest has been uncomfortable but her symptoms are the same as when she has acid reflux.  Patient also feels that "my urine is very yellow!"  She went to see her physician at [facility] on Monday, had CBC and BMP which were normal.  She denies any history of alcohol abuse, any new medication changes, and has not been covid vaccinated yet. Pt was transferred from [place], where she was found to have elevated bilirubin, alk phos, LFTs, UTI on u/a, and gallbladder wall thickening and pericholecystic fluid on gallbladder US. Pt was transferred for more advanced GI hepatobiliary workup. Allergic to lisinopril. The patient is a 51y Female with pmhx of HTN, DM, anemia, acid reflux complaining of painless jaundice. Patient states that for a few days now she's been feeling run down and has noted temps around 99F taken orally which she feels is a low grade fever. She started feeling ill after visiting [facility] for a social visit. She describes generalized malaise and today noted that her sclera appeared more yellow than usual. She denies any abdominal pains, fevers, nausea, vomiting, diarrhea. She says that her chest has been uncomfortable but her symptoms are the same as when she has acid reflux.  Patient also feels that "my urine is very yellow". She went to see her physician at [facility] on Monday, had CBC and BMP which were normal. She denies any history of alcohol abuse, any new medication changes, and has not been covid vaccinated yet. Pt was transferred from [place], where she was found to have elevated bilirubin, alk phos, LFTs, UTI on u/a, and gallbladder wall thickening and pericholecystic fluid on gallbladder US. Pt was transferred for more advanced GI hepatobiliary workup. Allergic to lisinopril.

## 2021-09-17 NOTE — H&P ADULT - NSHPREVIEWOFSYSTEMS_GEN_ALL_CORE
REVIEW OF SYSTEMS:    CONSTITUTIONAL: +malaise, +anorexia   RESPIRATORY: No cough, shortness of breath  CARDIOVASCULAR: No chest pain   GASTROINTESTINAL: No abdominal or epigastric pain. No nausea, vomiting, or hematemesis; No diarrhea or constipation. No melena or hematochezia.  GENITOURINARY: No dysuria  NEURO: No headache   SKIN: No jaundice  All other review of systems is negative unless indicated above.

## 2021-09-17 NOTE — CONSULT NOTE ADULT - SUBJECTIVE AND OBJECTIVE BOX
HPI:  52yo female with PMH DM on metformin, uterine fibroids with h/o anemia presents to ED c/o scleral icterus that has worsened over the past couple of days. Pt also noticed her urine was darker in color than usual, but lighter than tea- color. She admits to mild epigastric discomfort described as indigestion and had poor PO intake today due to not feeling hungry. She also reports menstrual cramping which is normal for her. Denies abdominal pain, n/v, f/c, cough, dyspnea, chest pain, h/o similar, change in bowel habits. Pt saw her PMD Dr Mosley earlier this week for routine check up.     PAST MEDICAL & SURGICAL HISTORY:  Iron deficiency anemia    HTN (hypertension)    DM (diabetes mellitus)    Iron deficiency anemia    Aborted ectopic pregnancy      Review of Systems:  as above, all others negative    Allergies  lisinopril (Swelling)    SOCIAL HISTORY: no tobacco use, occasional ETOH less than 2 drinks per week, works as           FAMILY HISTORY: no h/o cancer      Vital Signs Last 24 Hrs  T(C): 37.3 (16 Sep 2021 19:34), Max: 37.3 (16 Sep 2021 19:34)  T(F): 99.2 (16 Sep 2021 19:34), Max: 99.2 (16 Sep 2021 19:34)  HR: 106 (16 Sep 2021 19:34) (106 - 106)  BP: 122/79 (16 Sep 2021 19:34) (122/79 - 122/79)  BP(mean): --  RR: 20 (16 Sep 2021 19:34) (20 - 20)  SpO2: 99% (16 Sep 2021 19:34) (99% - 99%)    Physical Exam:  General:  Appears stated age, well-groomed, well-nourished, no distress  Eyes: EOMI, scleral icterus  HENT:  WNL, no JVD  Chest:  clear breath sounds  Cardiovascular:  Regular rate & rhythm  Abdomen:  soft, nontender and nondistended. No guarding, rebound, or rigidity. Normal active bowel sounds. Negative George's sign, liver palpable  Extremities:  no pedal edema or calf tenderness noted  Skin:  No rash  Musculoskeletal:  normal strength  Neuro/Psych:  Alert, oriented to time, place and person     LABS:                        10.4   4.84  )-----------( 236      ( 16 Sep 2021 20:20 )             31.6         134<L>  |  100  |  7   ----------------------------<  183<H>  4.1   |  28  |  0.81    Ca    9.0      16 Sep 2021 20:20  Mg     1.9         TPro  x   /  Alb  x   /  TBili  8.5<H>  /  DBili  7.10<H>  /  AST  x   /  ALT  x   /  AlkPhos  x         Urinalysis Basic - ( 16 Sep 2021 20:40 )    Color: Belem / Appearance: Slightly Turbid / S.020 / pH: x  Gluc: x / Ketone: Trace  / Bili: Large / Urobili: 8 mg/dL   Blood: x / Protein: 100 mg/dL / Nitrite: Positive   Leuk Esterase: Small / RBC: >50 /HPF / WBC 3-5   Sq Epi: x / Non Sq Epi: Moderate / Bacteria: Moderate        RADIOLOGY & ADDITIONAL STUDIES: < from: US Abdomen Limited (21 @ 21:16) >  IMPRESSION: Marked gallbladder wall thickening and pericholecystic fluid. Sonographic George's sign is negative.    Hepatomegaly.    < end of copied text >      Impression/Plan: 52yo female with PMH DM, NAGI and uterine fibroids seen with jaundice in setting of hyperbilirubinemia, transaminitis and hepatomegaly  no surgical intervention planned at present  await hepatitis panel, recommend GI work up, consider MRCP for further evaluation  medical management  will follow

## 2021-09-17 NOTE — CONSULT NOTE ADULT - SUBJECTIVE AND OBJECTIVE BOX
HPI:  50 yo F w/ PMHx HTN, DM presenting to OSH w/ jaundice x 3 days.     Patient states was in [facility] until 3d PTA when noticed scleral icterus. During this time, also had low grade fever (T max 99.7) w/ no localizing symptoms, so presented to outside ED.   Patient report no cough, dysuria, diarrhea, constipation. No abd pain. No h/o liver diseases. Used tylenol x 2 days PTA. No drug use. Recent tattoo 8 months prior. No recent travel, doesn't remember any new or raw foods. No h/o STD's, last sexual encounter 2 weeks prior. Medications include metformin, amlodipine, as well as vitamin C, zinc, and sometimes protein shakes.     On presentation to OSH, patient HDS. Labs w/ Hg 10.4, MCV 65, INR 1.3, AST/ALT 1851/1780, alk phos 269, T bili 9.7, with direct bili 7.1. RUQ US w/ hepatomegaly and GB wall thickening. CT A/P hepatomegaly w/ periportal edema and trace ascites, as well as uterine fibroids.     Allergies:  lisinopril (Swelling)      Home Medications:    Hospital Medications:  dextrose 40% Gel 15 Gram(s) Oral once  dextrose 5%. 1000 milliLiter(s) IV Continuous <Continuous>  dextrose 5%. 1000 milliLiter(s) IV Continuous <Continuous>  dextrose 50% Injectable 25 Gram(s) IV Push once  dextrose 50% Injectable 12.5 Gram(s) IV Push once  dextrose 50% Injectable 25 Gram(s) IV Push once  glucagon  Injectable 1 milliGRAM(s) IntraMuscular once  insulin lispro (ADMELOG) corrective regimen sliding scale   SubCutaneous three times a day before meals      PMHX/PSHX:  Iron deficiency anemia    HTN (hypertension)    DM (diabetes mellitus)    Iron deficiency anemia    Fibroid uterus    No significant past surgical history    Aborted ectopic pregnancy    H/O breast augmentation        Family history:  No pertinent family history in first degree relatives        Denies family history of colon cancer/polyps, stomach cancer/polyps, pancreatic cancer/masses, liver cancer/disease, ovarian cancer and endometrial cancer.    Social History:   Tob: Denies  EtOH: Denies  Illicit Drugs: Denies    ROS:     General:  No wt loss, fevers, chills, night sweats, fatigue  Eyes:  Good vision, no reported pain  ENT:  No sore throat, pain, runny nose, dysphagia  CV:  No pain, palpitations, hypo/hypertension  Pulm:  No dyspnea, cough, tachypnea, wheezing  GI:  see HPI  :  No pain, bleeding, incontinence, nocturia  Muscle:  No pain, weakness  Neuro:  No weakness, tingling, memory problems  Psych:  No fatigue, insomnia, mood problems, depression  Endocrine:  No polyuria, polydipsia, cold/heat intolerance  Heme:  No petechiae, ecchymosis, easy bruisability  Skin:  No rash, tattoos, scars, edema    PHYSICAL EXAM:     GENERAL:  No acute distress  HEENT:  NCAT, + scleral icterus   CHEST:  no respiratory distress  HEART:  Regular rate and rhythm  ABDOMEN:  Soft, non-tender, non-distended, normoactive bowel sounds,    EXTREMITIES: No edema  SKIN:  No rash/erythema/ecchymoses/petechiae/wounds/abscess/warm/dry  NEURO:  Alert and oriented x 3, no asterixis    Vital Signs:  Vital Signs Last 24 Hrs  T(C): 36.8 (17 Sep 2021 12:20), Max: 37.3 (16 Sep 2021 19:34)  T(F): 98.2 (17 Sep 2021 12:20), Max: 99.2 (16 Sep 2021 19:34)  HR: 81 (17 Sep 2021 12:20) (71 - 106)  BP: 124/69 (17 Sep 2021 12:20) (104/69 - 124/69)  BP(mean): --  RR: 16 (17 Sep 2021 12:20) (15 - 20)  SpO2: 100% (17 Sep 2021 12:20) (99% - 100%)  Daily Height in cm: 160.02 (17 Sep 2021 01:47)    Daily     LABS:                        10.4   4.84  )-----------( 236      ( 16 Sep 2021 20:20 )             31.6     Mean Cell Volume: 65.8 fl (- @ 20:20)        133<L>  |  99  |  5<L>  ----------------------------<  188<H>  4.1   |  25  |  0.65    Ca    8.2<L>      17 Sep 2021 04:21  Mg     1.9         TPro  5.7<L>  /  Alb  3.0<L>  /  TBili  8.3<H>  /  DBili  x   /  AST  1366<H>  /  ALT  1261<H>  /  AlkPhos  223<H>      LIVER FUNCTIONS - ( 17 Sep 2021 04:21 )  Alb: 3.0 g/dL / Pro: 5.7 g/dL / ALK PHOS: 223 U/L / ALT: 1261 U/L / AST: 1366 U/L / GGT: x           PT/INR - ( 17 Sep 2021 00:28 )   PT: 15.3 sec;   INR: 1.34 ratio         PTT - ( 17 Sep 2021 00:28 )  PTT:38.0 sec  Urinalysis Basic - ( 16 Sep 2021 20:40 )    Color: Belem / Appearance: Slightly Turbid / S.020 / pH: x  Gluc: x / Ketone: Trace  / Bili: Large / Urobili: 8 mg/dL   Blood: x / Protein: 100 mg/dL / Nitrite: Positive   Leuk Esterase: Small / RBC: >50 /HPF / WBC 3-5   Sq Epi: x / Non Sq Epi: Moderate / Bacteria: Moderate      Amylase Serum--      Lipase serum50       Ammonia--                          10.4   4.84  )-----------( 236      ( 16 Sep 2021 20:20 )             31.6       Imaging:  RUQ US 21  FINDINGS: Grayscale and duplex evaluation of the right upper quadrant was performed.    LIVER: Hepatomegaly with a craniocaudad length of 19 cm. Hepatopedal blood flow in the main portal vein.  BILE DUCTS: No intrahepatic biliary ductal dilatation is noted. The common bile duct measures 2 mm.  GALLBLADDER:Thickened appearance of the gallbladder wall despite underdistention. Wall measures 9 mm. Mild pericholecystic fluid. No stones are seen. Sonographic George's sign is negative.  PANCREAS: Visualized portions are within normal limits.  RIGHT KIDNEY: 11 cm in length. No hydronephrosis or renal calculi.    IMPRESSION: Marked gallbladder wall thickening and pericholecystic fluid. Sonographic George's sign is negative.    Hepatomegaly.      CT A/P 21  FINDINGS:  LOWER CHEST: Partially imaged bilateral breast implants. Bibasilar subsegmental atelectasis with associated trace pleural thickening.    LIVER: Enlarged liver. Right hepatic lobe measures 22.0 cm in craniocaudal diameter. Periportal edema.  BILE DUCTS: Mild periportal edema. Common bile duct is within normal limits. No CT evidence for choledocholithiasis.  GALLBLADDER: Gallbladder wall thickening with marked wall edema/pericholecystic fluid. No CT evidence for cholelithiasis.  SPLEEN: Within normal limits.  PANCREAS: Within normal limits.  ADRENALS: Within normal limits.  KIDNEYS/URETERS: Left parapelvic cyst. No hydronephrosis bilaterally. Symmetric enhancement bilaterally.    BLADDER: Minimally distended.  REPRODUCTIVE ORGANS: Enlarged uterus with a large fundal fibroid, measuring approximately 7.7 x 6.8 cm. Additional fibroids are seen as well.    BOWEL: No bowel obstruction. Appendix is normal.  PERITONEUM: Small pelvic free fluid, likely physiological.  VESSELS: Periportal edema.  RETROPERITONEUM/LYMPH NODES:Increased number of small periportal lymph nodes, likely reactive.  ABDOMINAL WALL: Nodular subcutaneous infiltration in the bilateral bilateral gluteal regions, similar in appearance to 2015.  BONES: Within normal limits.    IMPRESSION:  Enlarged liver with periportal edema and trace perihepatic ascites. Nonspecific thickening of the gallbladder wall. Findings raise a question of acute hepatitis. Correlate with clinical and laboratory findings.    Enlarged fibroid uterus.

## 2021-09-17 NOTE — ED PROVIDER NOTE - CLINICAL SUMMARY MEDICAL DECISION MAKING FREE TEXT BOX
Allan Felix MD (PGY-2): 52yo patient with new onset painless jaundice and hepatomegaly with gb wall thickening on US. VSS. Labs with markedly elevated LFTs. Pt transferred to [facility] for more advanced GI workup, including possible ERCP/MRCP. UA treated with Rocephin at [place]. Hepatitis panel, legionella, and mono pending. Will get CTAP w/ IV contrast, cmp, lipase. IVF and ibuprofen. Spoke to hospitalist, who wants to wait for CTAP results before admitting. Recommended giving zosyn in the meantime to cover for cholecystitis and UTI. Pt will most likely be admitted to Medicine.

## 2021-09-17 NOTE — H&P ADULT - NSHPLABSRESULTS_GEN_ALL_CORE
Labs:                         10.4   4.84  )-----------( 236      ( 16 Sep 2021 20:20 )             31.6       09-17    133<L>  |  99  |  5<L>  ----------------------------<  188<H>  4.1   |  25  |  0.65    Ca    8.2<L>      17 Sep 2021 04:21  Mg     1.9     09-16    TPro  5.7<L>  /  Alb  3.0<L>  /  TBili  8.3<H>  /  DBili  x   /  AST  1366<H>  /  ALT  1261<H>  /  AlkPhos  223<H>  09-17    Acetaminophen Level <2    Radiology:   < from: US Abdomen Limited (09.16.21 @ 21:16) >    EXAM:  US ABDOMEN LIMITED                            PROCEDURE DATE:  09/16/2021      LIVER: Hepatomegaly with a craniocaudad length of 19 cm. Hepatopedal blood flow in the main portal vein.  BILE DUCTS: No intrahepatic biliary ductal dilatation is noted. The common bile duct measures 2 mm.  GALLBLADDER:Thickened appearance of the gallbladder wall despite underdistention. Wall measures 9 mm. Mild pericholecystic fluid. No stones are seen. Sonographic George's sign is negative.  PANCREAS: Visualized portions are within normal limits.  RIGHT KIDNEY: 11 cm in length. No hydronephrosis or renal calculi.    IMPRESSION: Marked gallbladder wall thickening and pericholecystic fluid. Sonographic George's sign is negative.    Hepatomegaly.      --- End of Report ---    < from: CT Abdomen and Pelvis w/ IV Cont (09.17.21 @ 07:32) >    IMPRESSION:  Enlarged liver with periportal edema and trace perihepatic ascites. Nonspecific thickening of the gallbladder wall. Findings raise a question of acute hepatitis. Correlate with clinical and laboratory findings.    Enlarged fibroid uterus.    < end of copied text >

## 2021-09-17 NOTE — H&P ADULT - ATTENDING COMMENTS
52 yo F w/ hX DM, HTN anemia, uterine fibroid p/w jaundice and malaise started on friday. She went to her PCP on monday and BMP was drawn and were normal. Last LFT 5/21 were normal. She took tylenol and ibuprofen for subjective fever and malaise. She family noted her eyes were yellow which prompted a visit to ED. PE multiple tattoos no RUQ pain cta b/l labs TB-8.3 AST/YTG8064/1261; Alk phos 223 Tylenol <2; UA positive denies dsyruia US RUQ no billary ductal dilatation mild pericholecystic fluid. hepatomegaly.     Abnormal liver enzymes- concern for possible viral hep vs autoimmune                                      - HAV, HBV, HCV, HEV, HSV, VZV, EBV, CMV, DOUG, anti-smooth muscle, anti-mitochondrial, anti-microsomal, immunoglobulin, iron studies, ferritin, ceruloplasmin.                                    - f/u hepatology recs                                    - montior LFTS  Anemia - microcytic check fe studies and electrophoresis   DM- check A1c   abnormal UA- no symptoms; monitor off antibiotics

## 2021-09-17 NOTE — H&P ADULT - PROBLEM SELECTOR PLAN 3
UA on presentation +LE/Nit/Bact/RBC. Possible contamination as + for epithelial cells.  - Monitor for symptoms

## 2021-09-17 NOTE — H&P ADULT - PROBLEM SELECTOR PLAN 4
Hgb 10 on presentation with MCV 65. On Ferrous Sulfate 325 BID at home.  - Hold during acute hepatitis

## 2021-09-17 NOTE — ED ADULT TRIAGE NOTE - CHIEF COMPLAINT QUOTE
pt received as a transfer from [place] for GI consult. Pt went to [place] for general malaise and low grade fever. Pt states her sclera is normally yellow but has become worse. pt noted to have LFTs and hepatomegaly. Pt also found to have a UTI. pt given 1L NS and ceftriaxone. Pt had 18g iv to right AC with no redness or swelling noted. No complaints of chest pain, headache, nausea, dizziness, vomiting  SOB, fever, chills verbalized.

## 2021-09-17 NOTE — H&P ADULT - ASSESSMENT
50yo female PMHx DM, HTN, NAGI, Fibroid and history of new tattoos 8 months ago presenting with jaundice and malaise. Found to have liver enzymes in 1000s, elevated total and direct bilirubin. Abdominal imaging with US and CT suggestive of acute hepatitis. Currently hemodynamically stable and without abdominal pain. Most likely viral vs autoimmune hepatitis.

## 2021-09-17 NOTE — ED ADULT NURSE NOTE - CHIEF COMPLAINT QUOTE
pt received as a transfer from [place] for GI consult. Pt went to [place] for general malaise and low grade fever. Pt states her sclera is normally yellow but has become worse. pt noted to have LFTs and hepatomegaly. Pt also found to have a UTI. pt given 1L NS and ceftriaxone. Pt had 18g iv to right AC with no redness or swelling noted. No complaints of chest pain, headache, nausea, dizziness, vomiting  SOB, fever, chills verbalized.

## 2021-09-17 NOTE — H&P ADULT - NSICDXPASTMEDICALHX_GEN_ALL_CORE_FT
PAST MEDICAL HISTORY:  DM (diabetes mellitus)     Fibroid uterus     HTN (hypertension)     Iron deficiency anemia

## 2021-09-17 NOTE — ED ADULT NURSE REASSESSMENT NOTE - NS ED NURSE REASSESS COMMENT FT1
Pt received in room 19--pt appears in no distress--color jaundice,skin clean dry and intact. Pt has no c/o at present awaiting bed
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
## 2021-09-17 NOTE — H&P ADULT - NSHPPHYSICALEXAM_GEN_ALL_CORE
Vital Signs Last 24 Hrs  T(C): 36.8 (17 Sep 2021 12:20), Max: 37.3 (16 Sep 2021 19:34)  T(F): 98.2 (17 Sep 2021 12:20), Max: 99.2 (16 Sep 2021 19:34)  HR: 81 (17 Sep 2021 12:20) (71 - 106)  BP: 124/69 (17 Sep 2021 12:20) (104/69 - 124/69)  BP(mean): --  RR: 16 (17 Sep 2021 12:20) (15 - 20)  SpO2: 100% (17 Sep 2021 12:20) (99% - 100%)    General: NAD  HEENT: +Scleral icterus, no conjunctival injection  Cardiac: +S1/+S2, rrr, no m/r/g, no LE edema  Lung: CTAB, no wheezes, rales, or rhonchi  Abdomen: Nondistended, soft, nontender, no rebound or guarding  MSK: Moving all extremities  Neuro: Alert, Answers questions appropriately  Skin: +tattoos on lower back and legs, no rashes or skin discoloration

## 2021-09-17 NOTE — CONSULT NOTE ADULT - ASSESSMENT
Impression:  50 yo F w/ PMHx HTN, DM presenting w/ painless jaundice x 3d, found to have severe hepatocellular liver injury, transferred to [facility] for further management    # hepatocellular liver injury - patient w/ hepatocellular liver injury (R factor 19.9); ddx includes viral etiologies (most likely acute HAV given profound hyperbilirubinemia), DILI including acetaminophen (though no reports of excessive use), ischemic etiologies (no h/o hypotension or syncopal episode, stable dose of atenolol). Autoimmune etiologies less likely but possible. No pain to suggest stone disease. Elevated INR c/f worsening synthetic function. Currently mentating normally, not in fulminant liver failure; however if patient's condition worsens she would potentially be a transplant candidate    # microcytic anemia - MCV 65, no h/o GIB, suspect thalassemia    Recommendations:  - trend CMP, INR, Mg, Phos, lactate, fibrinogen q 12 hours   - please send HAV (IgG AND IgA), HBVsAb, HBVsAg, HBVcAb, HCV Ab, HCV RNA for viral etiologies  - please send EBV and CMV (IgG, IgM, PCR)  - please send DOUG, anti-mitochondrial antibody, anti-smooth muscle antibody, anti-liver kidney antibody, immunoglobulins (IgG, IgM, IgA quantitative) for autoimmune etiologies   - frequent neuro checks  - pending above and trend of liver enzymes, may require liver biopsy  - please send iron panel, ferritin, Hg electrophoresis for microcytic anemia  - abx per primary team for UTI  - rest of care per primary team    Hepatology will continue to follow.     Matthew Kirby, PGY5  Gastroenterology/Hepatology Fellow  Available on Microsoft Teams  [phone] ([facility] Short Range Pager)  [phone] (Long Range Pager)    After 5pm, please contact the on-call GI fellow. [phone] Impression:  50 yo F w/ PMHx HTN, DM presenting w/ painless jaundice x 3d, found to have severe hepatocellular liver injury, transferred to [facility] for further management    # hepatocellular liver injury - patient w/ hepatocellular liver injury (R factor 19.9); ddx includes viral etiologies (most likely acute HAV given profound hyperbilirubinemia), DILI including acetaminophen (though no reports of excessive use), ischemic etiologies (no h/o hypotension or syncopal episode, stable dose of atenolol). Autoimmune etiologies less likely but possible. No pain to suggest stone disease. Elevated INR c/f worsening synthetic function. Currently mentating normally, not in fulminant liver failure; however if patient's condition worsens she would potentially be a transplant candidate    # microcytic anemia - MCV 65, no h/o GIB, suspect thalassemia    Recommendations:  - trend CMP, INR, Mg, Phos, lactate, fibrinogen q 6hr hours   - please send HAV (IgG AND IgA), HBVsAb, HBVsAg, HBVcAb, HCV Ab, HCV RNA for viral etiologies  - please send EBV and CMV (IgG, IgM, PCR), HSV  - please send DOUG, anti-mitochondrial antibody, anti-smooth muscle antibody, anti-liver kidney antibody, immunoglobulins (IgG, IgM, IgA quantitative) for autoimmune etiologies   - frequent neuro checks  - pending above and trend of liver enzymes, may require liver biopsy  - please send iron panel, ferritin, Hg electrophoresis for microcytic anemia  - abx per primary team for UTI  - rest of care per primary team    Hepatology will continue to follow.     Matthew Kirby, PGY5  Gastroenterology/Hepatology Fellow  Available on Microsoft Teams  [phone] ([facility] Short Range Pager)  [phone] (Long Range Pager)    After 5pm, please contact the on-call GI fellow. [phone] Impression:  50 yo F w/ PMHx HTN, DM presenting w/ painless jaundice x 3d, found to have severe hepatocellular liver injury, transferred to [facility] for further management    # hepatocellular liver injury - patient w/ hepatocellular liver injury (R factor 19.9); ddx includes viral etiologies (most likely acute HAV given profound hyperbilirubinemia), DILI including acetaminophen (though no reports of excessive use), ischemic etiologies (no h/o hypotension or syncopal episode, stable dose of atenolol). Autoimmune etiologies less likely but possible. No pain to suggest stone disease. Elevated INR c/f worsening synthetic function. Currently mentating normally, not in fulminant liver failure; however if patient's condition worsens she would potentially be a transplant candidate    # microcytic anemia - MCV 65, no h/o GIB, suspect thalassemia    Recommendations:  - trend CMP, INR, Mg, Phos, lactate, fibrinogen q 6hr hours   - please send HAV (IgM AND IgA), HBsAb, HBsAg, HBcAb, HCV Ab, HCV RNA, HEV IgM for viral etiologies  - please send EBV and CMV (IgG, IgM, PCR), HSV PCR  - please send DOUG, anti-mitochondrial antibody, anti-smooth muscle antibody, anti-liver kidney antibody, immunoglobulins (IgG, IgM, IgA quantitative) for autoimmune etiologies   - frequent neuro checks  - pending above and trend of liver enzymes, may require liver biopsy  - please send iron panel, ferritin, Hg electrophoresis for microcytic anemia  - abx per primary team for UTI  - rest of care per primary team    Hepatology will continue to follow.     Matthew Kirby, PGY5  Gastroenterology/Hepatology Fellow  Available on Microsoft Teams  [phone] ([facility] Short Range Pager)  [phone] (Long Range Pager)    After 5pm, please contact the on-call GI fellow. [phone]

## 2021-09-17 NOTE — ED ADULT NURSE NOTE - OBJECTIVE STATEMENT
Patient received as transfer from [facility] for GI consult. Pt noted with jaundiced eyes, states "I think they said its something with my liver". Denies any nausea or vomiting. Denies chest pain or SOB. No fever reported. Labs sent.

## 2021-09-17 NOTE — PATIENT PROFILE ADULT - HEALTH LITERACY
GI Progress Note ([place]) Donovan Kern UHK:[id number] EM ATTG: Dr. Mook Richard PCP: Claudia Salinas Consultant:    
 
Shea Louie 
[id number] 
[id number]  
[phone] [id number] 
[phone] no

## 2021-09-17 NOTE — ED PROVIDER NOTE - ATTENDING CONTRIBUTION TO CARE
52yo F with PMHX DM, anemia, fibroids, no illicit drug use presenting to [facility] ED as transfer from [place] for GI specialty evaluation for new unconjugated hyperbilirubinemia with transaminitis in 1000s.  Pt says she went to [place] ED for ~1week of new and increasing scleral icterus, generalized fatigue, and Temp 99F at home.  No respiratory symptoms, n/v/d, recent travel, or over the counter supplements. Says she was intermittently using tylenol for her low grade fevers but scleral icterus cam before those symptoms.  Socially drinks alcohol and works as .    Of note, ultrasound performed at [place] showing hepatomegaly, thickened GB wall without stones and normal CBD size and negative sonographic mansfield sign. UA performed with ++bilirubin and +nitrites so IV ceftriaxone given. COVID negative, tylenol negative    General: Patient in no apparent distress, AAO x 3  Skin: Dry and intact  HEENT: Head atraumatic. Oral mucosa moist.   Eyes: Conjunctiva normal. ++scleral icterus.  Cardiac: Regular rhythm and rate. No pretibial edema b/l  Respiratory: Lungs clear b/l and symmetric. No respiratory distress. Able to speak in complete sentences.  Gastrointestinal: Abdomen soft, nondistended, nontender  Musculoskeletal: Moves all extremities spontaneously  Neurological: alert and oriented to person, place, and time  Psychiatric: Calm and cooperative    a/p  broad differential dx for hyperbilirubinemia- viral hepatitis, autoimmune, ?toxic   low clinical suspicion acute ascending cholangitis  not in fulminant hepatic failure. INR <2  will need admission for more comprehensive GI eval and testing  CTabd/pelvis in ED for further eval

## 2021-09-17 NOTE — H&P ADULT - PROBLEM SELECTOR PLAN 1
AST 1851, ALT 1780, AlkPhos 269 on initial presentation. CT and RUQ ultrasound showing evidence of acute hepatitis. Although gallbladder wall thickening and pericholecystic fluid noted on US, patient denies abdominal pain. No ductal dilatation noticed on ultrasound. Suspicion for biliary obstruction low. Findings suggestive of hepatocellular injury with acute viral hepatitis (history of tattoos) or autoimmune as possible etiology  - Avoid hepatotoxic agents  - Trend Coag, CMP   - Hepatology consult  - Viral/Autoimmune/Metabolic hepatitis workup sent AST 1851, ALT 1780, AlkPhos 269 on initial presentation. CT and RUQ ultrasound showing evidence of acute hepatitis. Although gallbladder wall thickening and pericholecystic fluid noted on US, patient denies abdominal pain. No ductal dilatation noticed on ultrasound. Suspicion for biliary obstruction low. Findings suggestive of hepatocellular injury with acute viral hepatitis (history of tattoos) or autoimmune as possible etiology  - Avoid hepatotoxic agents  - Trend Coag, CMP   - Hepatology consult  - Viral/Autoimmune/Metabolic hepatitis workup sent: HAV, HBV, HCV, HEV, HSV, VZV, EBV, CMV, anti-smooth muscle, anti-mitochondrial, anti-microsomal, immunoglobulin, iron studies, ferritin, cerulopaslmin AST 1851, ALT 1780, AlkPhos 269 on initial presentation. CT and RUQ ultrasound showing evidence of acute hepatitis. Although gallbladder wall thickening and pericholecystic fluid noted on US, patient denies abdominal pain. No ductal dilatation noticed on ultrasound. Suspicion for biliary obstruction low. Findings suggestive of hepatocellular injury with acute viral hepatitis (history of tattoos) or autoimmune as possible etiology  - Avoid hepatotoxic agents  - Trend Coag, CMP   - Hepatology consult  - Viral/Autoimmune/Metabolic hepatitis workup sent: HAV, HBV, HCV, HEV, HSV, VZV, EBV, CMV, DOUG, anti-smooth muscle, anti-mitochondrial, anti-microsomal, immunoglobulin, iron studies, ferritin, cerulopaslmin

## 2021-09-18 LAB
A1C WITH ESTIMATED AVERAGE GLUCOSE RESULT: 5.8 % — HIGH (ref 4–5.6)
ALBUMIN SERPL ELPH-MCNC: 3.2 G/DL — LOW (ref 3.3–5)
ALBUMIN SERPL ELPH-MCNC: 3.5 G/DL — SIGNIFICANT CHANGE UP (ref 3.3–5)
ALP SERPL-CCNC: 220 U/L — HIGH (ref 40–120)
ALP SERPL-CCNC: 225 U/L — HIGH (ref 40–120)
ALT FLD-CCNC: 1386 U/L — HIGH (ref 4–33)
ALT FLD-CCNC: 1496 U/L — HIGH (ref 4–33)
ANION GAP SERPL CALC-SCNC: 10 MMOL/L — SIGNIFICANT CHANGE UP (ref 7–14)
ANION GAP SERPL CALC-SCNC: 9 MMOL/L — SIGNIFICANT CHANGE UP (ref 7–14)
APTT BLD: 39.7 SEC — HIGH (ref 27–36.3)
AST SERPL-CCNC: 1944 U/L — HIGH (ref 4–32)
AST SERPL-CCNC: 2230 U/L — HIGH (ref 4–32)
BILIRUB SERPL-MCNC: 11.5 MG/DL — HIGH (ref 0.2–1.2)
BILIRUB SERPL-MCNC: 9.9 MG/DL — HIGH (ref 0.2–1.2)
BUN SERPL-MCNC: 4 MG/DL — LOW (ref 7–23)
BUN SERPL-MCNC: 4 MG/DL — LOW (ref 7–23)
CALCIUM SERPL-MCNC: 8.5 MG/DL — SIGNIFICANT CHANGE UP (ref 8.4–10.5)
CALCIUM SERPL-MCNC: 8.7 MG/DL — SIGNIFICANT CHANGE UP (ref 8.4–10.5)
CERULOPLASMIN SERPL-MCNC: 40 MG/DL — SIGNIFICANT CHANGE UP (ref 16–45)
CHLORIDE SERPL-SCNC: 101 MMOL/L — SIGNIFICANT CHANGE UP (ref 98–107)
CHLORIDE SERPL-SCNC: 98 MMOL/L — SIGNIFICANT CHANGE UP (ref 98–107)
CMV IGG FLD QL: >10 U/ML — HIGH
CMV IGG SERPL-IMP: POSITIVE
CMV IGM FLD-ACNC: 17.3 AU/ML — SIGNIFICANT CHANGE UP
CMV IGM SERPL QL: NEGATIVE — SIGNIFICANT CHANGE UP
CO2 SERPL-SCNC: 25 MMOL/L — SIGNIFICANT CHANGE UP (ref 22–31)
CO2 SERPL-SCNC: 27 MMOL/L — SIGNIFICANT CHANGE UP (ref 22–31)
COVID-19 SPIKE DOMAIN AB INTERP: POSITIVE
COVID-19 SPIKE DOMAIN ANTIBODY RESULT: 206 U/ML — HIGH
CREAT SERPL-MCNC: 0.58 MG/DL — SIGNIFICANT CHANGE UP (ref 0.5–1.3)
CREAT SERPL-MCNC: 0.63 MG/DL — SIGNIFICANT CHANGE UP (ref 0.5–1.3)
CULTURE RESULTS: SIGNIFICANT CHANGE UP
EBV EA AB SER IA-ACNC: 6.9 U/ML — SIGNIFICANT CHANGE UP
EBV EA AB TITR SER IF: POSITIVE
EBV EA IGG SER-ACNC: NEGATIVE — SIGNIFICANT CHANGE UP
EBV NA IGG SER IA-ACNC: >600 U/ML — HIGH
EBV PATRN SPEC IB-IMP: SIGNIFICANT CHANGE UP
EBV VCA IGG AVIDITY SER QL IA: POSITIVE
EBV VCA IGM SER IA-ACNC: <10 U/ML — SIGNIFICANT CHANGE UP
EBV VCA IGM SER IA-ACNC: >750 U/ML — HIGH
EBV VCA IGM TITR FLD: NEGATIVE — SIGNIFICANT CHANGE UP
ESTIMATED AVERAGE GLUCOSE: 120 — SIGNIFICANT CHANGE UP
FIBRINOGEN PPP-MCNC: 353 MG/DL — SIGNIFICANT CHANGE UP (ref 290–520)
FIBRINOGEN PPP-MCNC: 357 MG/DL — SIGNIFICANT CHANGE UP (ref 290–520)
GLUCOSE SERPL-MCNC: 154 MG/DL — HIGH (ref 70–99)
GLUCOSE SERPL-MCNC: 99 MG/DL — SIGNIFICANT CHANGE UP (ref 70–99)
HAV IGG SER QL IA: SIGNIFICANT CHANGE UP
HAV IGM SER-ACNC: SIGNIFICANT CHANGE UP
HBV CORE IGM SER-ACNC: REACTIVE
HBV CORE IGM SER-ACNC: REACTIVE
HBV SURFACE AB SER-ACNC: REACTIVE
HBV SURFACE AG SER-ACNC: REACTIVE
HCT VFR BLD CALC: 26.7 % — LOW (ref 34.5–45)
HCV AB S/CO SERPL IA: 0.12 S/CO — SIGNIFICANT CHANGE UP (ref 0–0.99)
HCV AB SERPL-IMP: SIGNIFICANT CHANGE UP
HGB BLD-MCNC: 9.1 G/DL — LOW (ref 11.5–15.5)
HSV1 IGG SER-ACNC: >62.2 INDEX — HIGH
HSV1 IGG SERPL QL IA: POSITIVE
HSV2 IGG FLD-ACNC: 7.69 INDEX — HIGH
HSV2 IGG SERPL QL IA: POSITIVE
IGA FLD-MCNC: 166 MG/DL — SIGNIFICANT CHANGE UP (ref 84–499)
IGG FLD-MCNC: 1449 MG/DL — SIGNIFICANT CHANGE UP (ref 610–1660)
IGM SERPL-MCNC: 339 MG/DL — HIGH (ref 35–242)
INR BLD: 1.5 RATIO — HIGH (ref 0.88–1.16)
INR BLD: 1.52 RATIO — HIGH (ref 0.88–1.16)
IRON SATN MFR SERPL: 11 % — LOW (ref 14–50)
IRON SATN MFR SERPL: 37 UG/DL — SIGNIFICANT CHANGE UP (ref 30–160)
KAPPA LC SER QL IFE: 4.83 MG/DL — HIGH (ref 0.33–1.94)
KAPPA/LAMBDA FREE LIGHT CHAIN RATIO, SERUM: 1.56 RATIO — SIGNIFICANT CHANGE UP (ref 0.26–1.65)
LACTATE SERPL-SCNC: 1.3 MMOL/L — SIGNIFICANT CHANGE UP (ref 0.5–2)
LACTATE SERPL-SCNC: 1.6 MMOL/L — SIGNIFICANT CHANGE UP (ref 0.5–2)
LAMBDA LC SER QL IFE: 3.1 MG/DL — HIGH (ref 0.57–2.63)
MAGNESIUM SERPL-MCNC: 1.9 MG/DL — SIGNIFICANT CHANGE UP (ref 1.6–2.6)
MAGNESIUM SERPL-MCNC: 1.9 MG/DL — SIGNIFICANT CHANGE UP (ref 1.6–2.6)
MCHC RBC-ENTMCNC: 22.1 PG — LOW (ref 27–34)
MCHC RBC-ENTMCNC: 34.1 GM/DL — SIGNIFICANT CHANGE UP (ref 32–36)
MCV RBC AUTO: 65 FL — LOW (ref 80–100)
NRBC # BLD: 0 /100 WBCS — SIGNIFICANT CHANGE UP
NRBC # FLD: 0 K/UL — SIGNIFICANT CHANGE UP
PHOSPHATE SERPL-MCNC: 2.4 MG/DL — LOW (ref 2.5–4.5)
PHOSPHATE SERPL-MCNC: 2.7 MG/DL — SIGNIFICANT CHANGE UP (ref 2.5–4.5)
PLATELET # BLD AUTO: 247 K/UL — SIGNIFICANT CHANGE UP (ref 150–400)
POTASSIUM SERPL-MCNC: 4.1 MMOL/L — SIGNIFICANT CHANGE UP (ref 3.5–5.3)
POTASSIUM SERPL-MCNC: 4.2 MMOL/L — SIGNIFICANT CHANGE UP (ref 3.5–5.3)
POTASSIUM SERPL-SCNC: 4.1 MMOL/L — SIGNIFICANT CHANGE UP (ref 3.5–5.3)
POTASSIUM SERPL-SCNC: 4.2 MMOL/L — SIGNIFICANT CHANGE UP (ref 3.5–5.3)
PROT SERPL-MCNC: 6.2 G/DL — SIGNIFICANT CHANGE UP (ref 6–8.3)
PROT SERPL-MCNC: 6.7 G/DL — SIGNIFICANT CHANGE UP (ref 6–8.3)
PROTHROM AB SERPL-ACNC: 16.8 SEC — HIGH (ref 10.6–13.6)
PROTHROM AB SERPL-ACNC: 17 SEC — HIGH (ref 10.6–13.6)
RBC # BLD: 4.11 M/UL — SIGNIFICANT CHANGE UP (ref 3.8–5.2)
RBC # FLD: 17.8 % — HIGH (ref 10.3–14.5)
SARS-COV-2 IGG+IGM SERPL QL IA: 206 U/ML — HIGH
SARS-COV-2 IGG+IGM SERPL QL IA: POSITIVE
SODIUM SERPL-SCNC: 134 MMOL/L — LOW (ref 135–145)
SODIUM SERPL-SCNC: 136 MMOL/L — SIGNIFICANT CHANGE UP (ref 135–145)
SPECIMEN SOURCE: SIGNIFICANT CHANGE UP
TIBC SERPL-MCNC: 327 UG/DL — SIGNIFICANT CHANGE UP (ref 220–430)
UIBC SERPL-MCNC: 290 UG/DL — SIGNIFICANT CHANGE UP (ref 110–370)
VZV IGG FLD QL IA: 2661 INDEX — SIGNIFICANT CHANGE UP
VZV IGG FLD QL IA: POSITIVE — SIGNIFICANT CHANGE UP
WBC # BLD: 4.6 K/UL — SIGNIFICANT CHANGE UP (ref 3.8–10.5)
WBC # FLD AUTO: 4.6 K/UL — SIGNIFICANT CHANGE UP (ref 3.8–10.5)

## 2021-09-18 PROCEDURE — 99232 SBSQ HOSP IP/OBS MODERATE 35: CPT

## 2021-09-18 PROCEDURE — 99233 SBSQ HOSP IP/OBS HIGH 50: CPT | Mod: GC

## 2021-09-18 PROCEDURE — 83020 HEMOGLOBIN ELECTROPHORESIS: CPT | Mod: 26

## 2021-09-18 RX ADMIN — ENOXAPARIN SODIUM 40 MILLIGRAM(S): 100 INJECTION SUBCUTANEOUS at 11:05

## 2021-09-18 RX ADMIN — Medication 1: at 12:35

## 2021-09-18 NOTE — PROGRESS NOTE ADULT - ASSESSMENT
52 yo F w/ PMHx HTN, DM presenting w/ painless jaundice x 3d, found to have severe hepatocellular liver injury, transferred to [facility] for further management    # hepatocellular liver injury - patient w/ hepatocellular liver injury (R factor 19.9); ddx includes viral etiologies (most likely acute HAV given profound hyperbilirubinemia), DILI including acetaminophen (though no reports of excessive use), ischemic etiologies (no h/o hypotension or syncopal episode, stable dose of atenolol). Autoimmune etiologies less likely but possible. No pain to suggest stone disease. Elevated INR c/f worsening synthetic function. Currently mentating normally, not in fulminant liver failure; however if patient's condition worsens she would potentially be a transplant candidate    # microcytic anemia - MCV 65, no h/o GIB, suspect thalassemia    Recommendations:   50 yo F w/ PMHx HTN, DM presenting w/ painless jaundice x 3d, found to have severe hepatocellular liver injury, transferred to [facility] for further management    # Acute Hepatitis B infection - patient w/ hepatocellular liver injury (R factor 19.9); work up revealing Hep B surface antigen positive and Hep B IgM core +, concerning for acute Hepatitis B infection. Risk factor includes recent tattoo 3 months ago.    95 % of these is cleared by patient, with 5% having chronic infection.  Uptrending INR and elevated Bili c/f worsening synthetic function. Currently mentating normally, not in fulminant liver failure; however if patient's condition worsens she would potentially be a transplant candidate    # microcytic anemia - MCV 65, no h/o GIB.    Recommendations:  -Obtain Hep B quant DNA  -Monitor INR, liver enzymes, and Bili q12H. IF INR continues to uptrend, will need to transfer patient to [facility].  -Avoid Hepatotoxic agents  -Rec to test for Hep B active infection/immunization on her partner and vaccinate if needed.     Will continue to monitor.    Yasir Luna MD  Gastroenterology/Hepatology Fellow  Contact on-call GI fellow via answering service ([phone]) from 5pm-8am AND on weekends/holidays

## 2021-09-18 NOTE — PROGRESS NOTE ADULT - ATTENDING COMMENTS
51F PMHx DM, HTN, NAGI admitted for likely acute hepatitis 2/2 Hepatitis B infection. Patient with recent new tattoos as well as new sexual partner within the past few weeks.    -Hepatology following, appreciate recs  -F/u hepatitis workup, Hep B surface Ab, quantitative PCR, Hep D and E serologies  -Monitor PT/INR, CMP q12h  -If liver function continues to worsen patient may need to be transferred to [facility] for transplant evaluation    D/w Dr. Vallejo

## 2021-09-18 NOTE — PROGRESS NOTE ADULT - ASSESSMENT
52yo female PMHx DM, HTN, NAGI, Fibroid and history of new tattoos 8 months ago presenting with jaundice and malaise. Found to have liver enzymes in 1000s, elevated total and direct bilirubin. Abdominal imaging with US and CT suggestive of acute hepatitis. Currently hemodynamically stable and without abdominal pain. Hep B IgM +.

## 2021-09-18 NOTE — PROGRESS NOTE ADULT - ATTENDING COMMENTS
Hepatology Staff: Echo Corral MD  I saw and examined the patient along with  Dr. Porter on 09-18-21 @ 12:14  Patient Medical Record, hosptial course was reviewed and summarized as below:    Vitals: Vital Signs Last 24 Hrs  T(C): 36.9 (18 Sep 2021 05:37), Max: 37.6 (17 Sep 2021 21:57)  T(F): 98.5 (18 Sep 2021 05:37), Max: 99.6 (17 Sep 2021 21:57)  HR: 79 (18 Sep 2021 05:37) (79 - 88)  BP: 120/73 (18 Sep 2021 05:37) (112/69 - 132/84)    RR: 18 (18 Sep 2021 05:37) (16 - 18)  SpO2: 100% (18 Sep 2021 05:37) (95% - 100%)    Labs:Creatinine, Serum: 0.58 mg/dL (09-18-21 @ 07:20)  Bilirubin Total, Serum: 9.9 mg/dL (09-18-21 @ 07:20)  INR: 1.50 ratio (09-18-21 @ 07:20)  Creatinine, Serum: 0.61 mg/dL (09-17-21 @ 15:47)  Bilirubin Total, Serum: 8.9 mg/dL (09-17-21 @ 15:47)      I/O: I&O's Summary    Nutritional Status: Weight (kg): 68 (09-17-21 @ 18:00)  Albumin, Serum: 3.2 g/dL (09-18-21 @ 07:20)  Albumin, Serum: 3.1 g/dL (09-17-21 @ 15:47)    Recommendations: This is a 51-year-old female with history of hypertension, diabetes currently admitted with painless jaundice 3 days, elevated liver tests in a hepatitis pattern.  Today total bilirubin is 9.9 mg/dL, AST 1944, ALT 1386, alkaline phosphatase 220 follow-up which appears to be slowly worsening.  INR is 1.5 which is also worsening.  Clinically patient remains AAO x3 and remains comfortable.  Viral serology for hepatitis B surface antigen and IgM hepatitis B core antibody is positive.  Hepatitis A IgM antibody is negative.  This is concerning for acute hepatitis B.  HBV DNA is pending.  We will continue to monitor closely with every 12 hours LFTs and INR.   Continue to monitor mental status.   Keep low threshold to transfer to [facility] if finding concerning for acute liver failure.  Will discuss need for antiviral therapy on follow up.      Plan discussed with Primary team.

## 2021-09-18 NOTE — PROGRESS NOTE ADULT - SUBJECTIVE AND OBJECTIVE BOX
Cristina Vallejo Y1  [phone]    Patient is a 51y old  Female who presents with a chief complaint of Elevated Liver Enzymes (17 Sep 2021 13:17)      SUBJECTIVE / OVERNIGHT EVENTS:  Patient seen and examined at bedside.  No acute events overnight. Slept well. Denying abdominal pain, N/V/D, chest pain.   Hep B+. Patient informed of result, happy to have a diagnosis. Endorsing unprotected sex a few months ago, as well as recent new tattoos     Other Review of Systems Negative.    MEDICATIONS  (STANDING):  cefTRIAXone   IVPB 1000 milliGRAM(s) IV Intermittent every 24 hours  dextrose 40% Gel 15 Gram(s) Oral once  dextrose 5%. 1000 milliLiter(s) (50 mL/Hr) IV Continuous <Continuous>  dextrose 5%. 1000 milliLiter(s) (100 mL/Hr) IV Continuous <Continuous>  dextrose 50% Injectable 25 Gram(s) IV Push once  dextrose 50% Injectable 12.5 Gram(s) IV Push once  dextrose 50% Injectable 25 Gram(s) IV Push once  enoxaparin Injectable 40 milliGRAM(s) SubCutaneous daily  glucagon  Injectable 1 milliGRAM(s) IntraMuscular once  influenza   Vaccine 0.5 milliLiter(s) IntraMuscular once  insulin lispro (ADMELOG) corrective regimen sliding scale   SubCutaneous three times a day before meals  insulin lispro (ADMELOG) corrective regimen sliding scale   SubCutaneous at bedtime    MEDICATIONS  (PRN):      OBJECTIVE:    Vital Signs Last 24 Hrs  T(C): 36.9 (18 Sep 2021 05:37), Max: 37.6 (17 Sep 2021 21:57)  T(F): 98.5 (18 Sep 2021 05:37), Max: 99.6 (17 Sep 2021 21:57)  HR: 79 (18 Sep 2021 05:37) (79 - 88)  BP: 120/73 (18 Sep 2021 05:37) (112/69 - 132/84)  BP(mean): --  RR: 18 (18 Sep 2021 05:37) (16 - 18)  SpO2: 100% (18 Sep 2021 05:37) (95% - 100%)    CAPILLARY BLOOD GLUCOSE      POCT Blood Glucose.: 88 mg/dL (18 Sep 2021 08:37)  POCT Blood Glucose.: 117 mg/dL (17 Sep 2021 21:57)  POCT Blood Glucose.: 220 mg/dL (17 Sep 2021 18:18)  POCT Blood Glucose.: 110 mg/dL (17 Sep 2021 13:41)    I&O's Summary      PHYSICAL EXAM:  GENERAL: NAD, well-developed. Resting comfortably in bed   HEAD:  Atraumatic, Normocephalic  EYES: scleral icterus   NECK: Supple, No JVD  CHEST/LUNG: Clear to auscultation bilaterally; No wheeze  HEART: Regular rate and rhythm; No murmurs, rubs, or gallops  ABDOMEN: Soft, Nontender, Nondistended; Bowel sounds present  EXTREMITIES:  2+ Peripheral Pulses, No clubbing, cyanosis, or edema  PSYCH: AAOx3  NEUROLOGY: non-focal  SKIN: normal, no rashes     LABS:                        9.1    4.60  )-----------( 247      ( 18 Sep 2021 07:20 )             26.7     Auto Eosinophil # x     / Auto Eosinophil % x     / Auto Neutrophil # x     / Auto Neutrophil % x     / BANDS % x                            10.4   4.84  )-----------( 236      ( 16 Sep 2021 20:20 )             31.6     Auto Eosinophil # 0.10  / Auto Eosinophil % 2.0   / Auto Neutrophil # 2.03  / Auto Neutrophil % 37.0  / BANDS % 5.0          136  |  101  |  4<L>  ----------------------------<  99  4.1   |  25  |  0.58      137  |  102  |  4<L>  ----------------------------<  210<H>  4.0   |  27  |  0.61      133<L>  |  99  |  5<L>  ----------------------------<  188<H>  4.1   |  25  |  0.65    Ca    8.5      18 Sep 2021 07:20  Mg     1.90       Phos  2.7       TPro  6.2  /  Alb  3.2<L>  /  TBili  9.9<H>  /  DBili  x   /  AST  1944<H>  /  ALT  1386<H>  /  AlkPhos  220<H>    TPro  5.9<L>  /  Alb  3.1<L>  /  TBili  8.9<H>  /  DBili  x   /  AST  1539<H>  /  ALT  1253<H>  /  AlkPhos  221<H>    TPro  5.7<L>  /  Alb  3.0<L>  /  TBili  8.3<H>  /  DBili  x   /  AST  1366<H>  /  ALT  1261<H>  /  AlkPhos  223<H>      PT/INR - ( 18 Sep 2021 07:20 )   PT: 16.8 sec;   INR: 1.50 ratio         PTT - ( 18 Sep 2021 07:20 )  PTT:39.7 sec  CARDIAC MARKERS ( 16 Sep 2021 20:20 )  <.015 ng/mL / x     / 52 U/L / x     / x          Urinalysis Basic - ( 16 Sep 2021 20:40 )    Color: Belem / Appearance: Slightly Turbid / S.020 / pH: x  Gluc: x / Ketone: Trace  / Bili: Large / Urobili: 8 mg/dL   Blood: x / Protein: 100 mg/dL / Nitrite: Positive   Leuk Esterase: Small / RBC: >50 /HPF / WBC 3-5   Sq Epi: x / Non Sq Epi: Moderate / Bacteria: Moderate      Lactate, Blood: 1.3 mmol/L ( @ 07:20)  Lactate, Blood: 1.4 mmol/L ( @ 15:47)        ABG:     VBG:       Care Discussed with Consultants/Other Providers:    RADIOLOGY & ADDITIONAL TESTS:  (Imaging Personally Reviewed)

## 2021-09-18 NOTE — PROGRESS NOTE ADULT - PROBLEM SELECTOR PLAN 1
AST 1851, ALT 1780, AlkPhos 269 on initial presentation. CT and RUQ ultrasound showing evidence of acute hepatitis. Although gallbladder wall thickening and pericholecystic fluid noted on US, patient denies abdominal pain. No ductal dilatation noticed on ultrasound. Suspicion for biliary obstruction low. Findings suggestive of hepatocellular injury from acute viral hepatitis (history of tattoos and unprotected sex)   -Hepatitis B positive. Will discuss w hepatology for further treamtnet    - Avoid hepatotoxic agents  - Trend Coag, CMP   - Hepatology consult  - Viral/Autoimmune/Metabolic hepatitis workup sent: HAV, HBV, HCV, HEV, HSV, VZV, EBV, CMV, DOUG, anti-smooth muscle, anti-mitochondrial, anti-microsomal, immunoglobulin, iron studies, ferritin, ceruloplasmin

## 2021-09-18 NOTE — PROGRESS NOTE ADULT - SUBJECTIVE AND OBJECTIVE BOX
Interval Events:       Hospital Medications:  dextrose 40% Gel 15 Gram(s) Oral once  dextrose 5%. 1000 milliLiter(s) IV Continuous <Continuous>  dextrose 5%. 1000 milliLiter(s) IV Continuous <Continuous>  dextrose 50% Injectable 25 Gram(s) IV Push once  dextrose 50% Injectable 12.5 Gram(s) IV Push once  dextrose 50% Injectable 25 Gram(s) IV Push once  enoxaparin Injectable 40 milliGRAM(s) SubCutaneous daily  glucagon  Injectable 1 milliGRAM(s) IntraMuscular once  influenza   Vaccine 0.5 milliLiter(s) IntraMuscular once  insulin lispro (ADMELOG) corrective regimen sliding scale   SubCutaneous three times a day before meals  insulin lispro (ADMELOG) corrective regimen sliding scale   SubCutaneous at bedtime        PHYSICAL EXAM:   Vital Signs:  Vital Signs Last 24 Hrs  T(C): 36.9 (18 Sep 2021 05:37), Max: 37.6 (17 Sep 2021 21:57)  T(F): 98.5 (18 Sep 2021 05:37), Max: 99.6 (17 Sep 2021 21:57)  HR: 79 (18 Sep 2021 05:37) (79 - 88)  BP: 120/73 (18 Sep 2021 05:37) (112/69 - 132/84)  BP(mean): --  RR: 18 (18 Sep 2021 05:37) (16 - 18)  SpO2: 100% (18 Sep 2021 05:37) (95% - 100%)  Daily     Daily Weight in k (17 Sep 2021 18:00)    GENERAL:  NAD, Appears stated age  HEENT:  NC/AT,  conjunctivae clear and pink, sclera -anicteric  CHEST:  Normal Effort, Breath sounds clear  HEART:  RRR, S1 + S2, no murmurs  ABDOMEN:  Soft, non-tender, non-distended, normoactive bowel sounds,  no masses  EXTREMITIES:  no cyanosis or edema  SKIN:  Warm & Dry. No rash or erythema  NEURO:  Alert, oriented, no focal deficit    LABS:                        9.1    4.60  )-----------( 247      ( 18 Sep 2021 07:20 )             26.7     Mean Cell Volume: 65.0 fL (- @ 07:20)        136  |  101  |  4<L>  ----------------------------<  99  4.1   |  25  |  0.58    Ca    8.5      18 Sep 2021 07:20  Phos  2.7       Mg     1.90         TPro  6.2  /  Alb  3.2<L>  /  TBili  9.9<H>  /  DBili  x   /  AST  1944<H>  /  ALT  1386<H>  /  AlkPhos  220<H>      LIVER FUNCTIONS - ( 18 Sep 2021 07:20 )  Alb: 3.2 g/dL / Pro: 6.2 g/dL / ALK PHOS: 220 U/L / ALT: 1386 U/L / AST: 1944 U/L / GGT: x           PT/INR - ( 18 Sep 2021 07:20 )   PT: 16.8 sec;   INR: 1.50 ratio         PTT - ( 18 Sep 2021 07:20 )  PTT:39.7 sec  Urinalysis Basic - ( 16 Sep 2021 20:40 )    Color: Belem / Appearance: Slightly Turbid / S.020 / pH: x  Gluc: x / Ketone: Trace  / Bili: Large / Urobili: 8 mg/dL   Blood: x / Protein: 100 mg/dL / Nitrite: Positive   Leuk Esterase: Small / RBC: >50 /HPF / WBC 3-5   Sq Epi: x / Non Sq Epi: Moderate / Bacteria: Moderate                              9.1    4.60  )-----------( 247      ( 18 Sep 2021 07:20 )             26.7                         10.4   4.84  )-----------( 236      ( 16 Sep 2021 20:20 )             31.6       Imaging: Images reviewed no new images appreciated.         Interval Events:   No acute events, patient denies any N, V or abdominal pain at the moment. Mental status intact.     Hospital Medications:  dextrose 40% Gel 15 Gram(s) Oral once  dextrose 5%. 1000 milliLiter(s) IV Continuous <Continuous>  dextrose 5%. 1000 milliLiter(s) IV Continuous <Continuous>  dextrose 50% Injectable 25 Gram(s) IV Push once  dextrose 50% Injectable 12.5 Gram(s) IV Push once  dextrose 50% Injectable 25 Gram(s) IV Push once  enoxaparin Injectable 40 milliGRAM(s) SubCutaneous daily  glucagon  Injectable 1 milliGRAM(s) IntraMuscular once  influenza   Vaccine 0.5 milliLiter(s) IntraMuscular once  insulin lispro (ADMELOG) corrective regimen sliding scale   SubCutaneous three times a day before meals  insulin lispro (ADMELOG) corrective regimen sliding scale   SubCutaneous at bedtime        PHYSICAL EXAM:   Vital Signs:  Vital Signs Last 24 Hrs  T(C): 36.9 (18 Sep 2021 05:37), Max: 37.6 (17 Sep 2021 21:57)  T(F): 98.5 (18 Sep 2021 05:37), Max: 99.6 (17 Sep 2021 21:57)  HR: 79 (18 Sep 2021 05:37) (79 - 88)  BP: 120/73 (18 Sep 2021 05:37) (112/69 - 132/84)  BP(mean): --  RR: 18 (18 Sep 2021 05:37) (16 - 18)  SpO2: 100% (18 Sep 2021 05:37) (95% - 100%)  Daily     Daily Weight in k (17 Sep 2021 18:00)    GENERAL:  NAD, Appears stated age  HEENT:  NC/AT,  conjunctivae clear and pink, sclera icterus  CHEST:  Normal Effort, Breath sounds clear  HEART:  RRR, S1 + S2, no murmurs  ABDOMEN:  Soft, tender in RUQ  EXTREMITIES:  no cyanosis or edema  SKIN:  Warm & Dry. No rash or erythema  NEURO:  Alert, oriented, no focal deficit    LABS:                        9.1    4.60  )-----------( 247      ( 18 Sep 2021 07:20 )             26.7     Mean Cell Volume: 65.0 fL (- @ 07:20)        136  |  101  |  4<L>  ----------------------------<  99  4.1   |  25  |  0.58    Ca    8.5      18 Sep 2021 07:20  Phos  2.7       Mg     1.90         TPro  6.2  /  Alb  3.2<L>  /  TBili  9.9<H>  /  DBili  x   /  AST  1944<H>  /  ALT  1386<H>  /  AlkPhos  220<H>      LIVER FUNCTIONS - ( 18 Sep 2021 07:20 )  Alb: 3.2 g/dL / Pro: 6.2 g/dL / ALK PHOS: 220 U/L / ALT: 1386 U/L / AST: 1944 U/L / GGT: x           PT/INR - ( 18 Sep 2021 07:20 )   PT: 16.8 sec;   INR: 1.50 ratio         PTT - ( 18 Sep 2021 07:20 )  PTT:39.7 sec  Urinalysis Basic - ( 16 Sep 2021 20:40 )    Color: Belem / Appearance: Slightly Turbid / S.020 / pH: x  Gluc: x / Ketone: Trace  / Bili: Large / Urobili: 8 mg/dL   Blood: x / Protein: 100 mg/dL / Nitrite: Positive   Leuk Esterase: Small / RBC: >50 /HPF / WBC 3-5   Sq Epi: x / Non Sq Epi: Moderate / Bacteria: Moderate                              9.1    4.60  )-----------( 247      ( 18 Sep 2021 07:20 )             26.7                         10.4   4.84  )-----------( 236      ( 16 Sep 2021 20:20 )             31.6       Imaging: Images reviewed no new images appreciated.

## 2021-09-19 LAB
ALBUMIN SERPL ELPH-MCNC: 3.2 G/DL — LOW (ref 3.3–5)
ALBUMIN SERPL ELPH-MCNC: 3.4 G/DL — SIGNIFICANT CHANGE UP (ref 3.3–5)
ALP SERPL-CCNC: 202 U/L — HIGH (ref 40–120)
ALP SERPL-CCNC: 215 U/L — HIGH (ref 40–120)
ALT FLD-CCNC: 1385 U/L — HIGH (ref 4–33)
ALT FLD-CCNC: 1590 U/L — HIGH (ref 4–33)
ANION GAP SERPL CALC-SCNC: 10 MMOL/L — SIGNIFICANT CHANGE UP (ref 7–14)
ANION GAP SERPL CALC-SCNC: 10 MMOL/L — SIGNIFICANT CHANGE UP (ref 7–14)
AST SERPL-CCNC: 2359 U/L — HIGH (ref 4–32)
AST SERPL-CCNC: 2958 U/L — HIGH (ref 4–32)
BILIRUB SERPL-MCNC: 11.4 MG/DL — HIGH (ref 0.2–1.2)
BILIRUB SERPL-MCNC: 13 MG/DL — HIGH (ref 0.2–1.2)
BUN SERPL-MCNC: 4 MG/DL — LOW (ref 7–23)
BUN SERPL-MCNC: 5 MG/DL — LOW (ref 7–23)
CALCIUM SERPL-MCNC: 8.6 MG/DL — SIGNIFICANT CHANGE UP (ref 8.4–10.5)
CALCIUM SERPL-MCNC: 8.6 MG/DL — SIGNIFICANT CHANGE UP (ref 8.4–10.5)
CHLORIDE SERPL-SCNC: 98 MMOL/L — SIGNIFICANT CHANGE UP (ref 98–107)
CHLORIDE SERPL-SCNC: 98 MMOL/L — SIGNIFICANT CHANGE UP (ref 98–107)
CO2 SERPL-SCNC: 26 MMOL/L — SIGNIFICANT CHANGE UP (ref 22–31)
CO2 SERPL-SCNC: 27 MMOL/L — SIGNIFICANT CHANGE UP (ref 22–31)
CREAT SERPL-MCNC: 0.63 MG/DL — SIGNIFICANT CHANGE UP (ref 0.5–1.3)
CREAT SERPL-MCNC: 0.71 MG/DL — SIGNIFICANT CHANGE UP (ref 0.5–1.3)
FIBRINOGEN PPP-MCNC: 316 MG/DL — SIGNIFICANT CHANGE UP (ref 290–520)
FIBRINOGEN PPP-MCNC: 354 MG/DL — SIGNIFICANT CHANGE UP (ref 290–520)
GLUCOSE SERPL-MCNC: 127 MG/DL — HIGH (ref 70–99)
GLUCOSE SERPL-MCNC: 161 MG/DL — HIGH (ref 70–99)
HBV E AB SER-ACNC: REACTIVE
HBV E AG SER-ACNC: SIGNIFICANT CHANGE UP
INR BLD: 1.58 RATIO — HIGH (ref 0.88–1.16)
LACTATE SERPL-SCNC: 1.4 MMOL/L — SIGNIFICANT CHANGE UP (ref 0.5–2)
LACTATE SERPL-SCNC: 1.8 MMOL/L — SIGNIFICANT CHANGE UP (ref 0.5–2)
MAGNESIUM SERPL-MCNC: 2 MG/DL — SIGNIFICANT CHANGE UP (ref 1.6–2.6)
PHOSPHATE SERPL-MCNC: 3 MG/DL — SIGNIFICANT CHANGE UP (ref 2.5–4.5)
POTASSIUM SERPL-MCNC: 4.3 MMOL/L — SIGNIFICANT CHANGE UP (ref 3.5–5.3)
POTASSIUM SERPL-MCNC: 4.5 MMOL/L — SIGNIFICANT CHANGE UP (ref 3.5–5.3)
POTASSIUM SERPL-SCNC: 4.3 MMOL/L — SIGNIFICANT CHANGE UP (ref 3.5–5.3)
POTASSIUM SERPL-SCNC: 4.5 MMOL/L — SIGNIFICANT CHANGE UP (ref 3.5–5.3)
PROT SERPL-MCNC: 6 G/DL — SIGNIFICANT CHANGE UP (ref 6–8.3)
PROT SERPL-MCNC: 6.6 G/DL — SIGNIFICANT CHANGE UP (ref 6–8.3)
PROTHROM AB SERPL-ACNC: 17.8 SEC — HIGH (ref 10.6–13.6)
SODIUM SERPL-SCNC: 134 MMOL/L — LOW (ref 135–145)
SODIUM SERPL-SCNC: 135 MMOL/L — SIGNIFICANT CHANGE UP (ref 135–145)

## 2021-09-19 PROCEDURE — 99232 SBSQ HOSP IP/OBS MODERATE 35: CPT

## 2021-09-19 PROCEDURE — 99233 SBSQ HOSP IP/OBS HIGH 50: CPT | Mod: GC

## 2021-09-19 RX ORDER — SIMETHICONE 80 MG/1
80 TABLET, CHEWABLE ORAL THREE TIMES A DAY
Refills: 0 | Status: COMPLETED | OUTPATIENT
Start: 2021-09-19 | End: 2021-09-20

## 2021-09-19 RX ADMIN — SIMETHICONE 80 MILLIGRAM(S): 80 TABLET, CHEWABLE ORAL at 14:52

## 2021-09-19 RX ADMIN — Medication 1: at 17:27

## 2021-09-19 RX ADMIN — ENOXAPARIN SODIUM 40 MILLIGRAM(S): 100 INJECTION SUBCUTANEOUS at 11:15

## 2021-09-19 RX ADMIN — SIMETHICONE 80 MILLIGRAM(S): 80 TABLET, CHEWABLE ORAL at 22:40

## 2021-09-19 RX ADMIN — Medication 1: at 12:53

## 2021-09-19 NOTE — PROGRESS NOTE ADULT - ATTENDING COMMENTS
51F PMHx DM, HTN, NAGI admitted for likely acute hepatitis 2/2 Hepatitis B infection. Patient with recent new tattoos as well as new sexual partner within the past few weeks.    -Hepatology following, appreciate recs  -Hepatitis B surface antigen and Core Ab positive, likely acute viral hepatitis  -F/u Hep B quantitative DNA  -Monitor PT/INR, CMP q12h  -Liver enzymes appear to be plateauing   -If liver function continues to worsen patient may need to be transferred to [facility] for transplant evaluation  -No signs of hepatic encephalopathy currently    D/w Dr. Lopez

## 2021-09-19 NOTE — PROGRESS NOTE ADULT - ASSESSMENT
50 yo F w/ PMHx HTN, DM presenting w/ painless jaundice x 3d, found to have severe hepatocellular liver injury, transferred to [facility] for further management    # Acute Hepatitis B infection - patient w/ hepatocellular liver injury (R factor 19.9); work up revealing Hep B surface antigen positive,  Hep B IgM core +, Hep Be antibody, concerning for acute Hepatitis B infection. Risk factor includes recent tattoo 3 months ago.    95 % of these is cleared by patient, with 5% having chronic infection.  Uptrending INR and elevated Bili c/f worsening synthetic function. Currently mentating normally, not in fulminant liver failure; however if patient's condition worsens she would potentially be a transplant candidate    # microcytic anemia - MCV 65, no h/o GIB.    Recommendations:  -pending Hep B quant DNA  -Monitor INR, liver enzymes, and Bili q12H. IF INR continues to uptrend (> 2), will need to transfer patient to [facility].  -Avoid Hepatotoxic agents  -Rec to test for Hep B active infection/immunization on her partner and vaccinate if needed.     Will continue to monitor.    Yasir Luna MD  Gastroenterology/Hepatology Fellow  Contact on-call GI fellow via answering service ([phone]) from 5pm-8am AND on weekends/holidays     52 yo F w/ PMHx HTN, DM presenting w/ painless jaundice x 3d, found to have severe hepatocellular liver injury, transferred to [facility] for further management    # Acute Hepatitis B infection - patient w/ hepatocellular liver injury (R factor 19.9); work up revealing Hep B surface antigen positive,  Hep B IgM core +, Hep Be antibody, concerning for acute Hepatitis B infection. Risk factor includes recent tattoo 3 months ago.    95 % of these is cleared by patient, with 5% having chronic infection.  Uptrending INR and elevated Bili c/f worsening synthetic function. Currently mentating normally, not in fulminant liver failure; however if patient's condition worsens she would potentially be a transplant candidate    # microcytic anemia - MCV 65, no h/o GIB.    Recommendations:  -pending Hep B quant DNA  -Monitor INR, liver enzymes, and Bili q12H. IF INR continues to uptrend (> 2), will need to transfer patient to [facility]; however, seems to be stable with liver enzymes plateauing   -Avoid Hepatotoxic agents  -Rec to test for Hep B active infection/immunization on her partner and vaccinate if needed.     Will continue to monitor.    Yasir Luna MD  Gastroenterology/Hepatology Fellow  Contact on-call GI fellow via answering service ([phone]) from 5pm-8am AND on weekends/holidays

## 2021-09-19 NOTE — PROGRESS NOTE ADULT - SUBJECTIVE AND OBJECTIVE BOX
Hannah Lopez MD  PGY 3 Department of Internal Medicine  Pager: [phone] ([facility])   Pager: [phone] ([facility])    Patient is a 51y old  Female who presents with a chief complaint of Elevated Liver Enzymes (19 Sep 2021 08:01)      SUBJECTIVE / OVERNIGHT EVENTS: Pt seen and examined. No acute overnight events.        MEDICATIONS  (STANDING):  dextrose 40% Gel 15 Gram(s) Oral once  dextrose 5%. 1000 milliLiter(s) (50 mL/Hr) IV Continuous <Continuous>  dextrose 5%. 1000 milliLiter(s) (100 mL/Hr) IV Continuous <Continuous>  dextrose 50% Injectable 25 Gram(s) IV Push once  dextrose 50% Injectable 12.5 Gram(s) IV Push once  dextrose 50% Injectable 25 Gram(s) IV Push once  enoxaparin Injectable 40 milliGRAM(s) SubCutaneous daily  glucagon  Injectable 1 milliGRAM(s) IntraMuscular once  influenza   Vaccine 0.5 milliLiter(s) IntraMuscular once  insulin lispro (ADMELOG) corrective regimen sliding scale   SubCutaneous three times a day before meals  insulin lispro (ADMELOG) corrective regimen sliding scale   SubCutaneous at bedtime    MEDICATIONS  (PRN):      I&O's Summary      Vital Signs Last 24 Hrs  T(C): 36.9 (19 Sep 2021 06:57), Max: 37.7 (18 Sep 2021 21:43)  T(F): 98.5 (19 Sep 2021 06:57), Max: 99.8 (18 Sep 2021 21:43)  HR: 82 (19 Sep 2021 06:57) (81 - 85)  BP: 116/74 (19 Sep 2021 06:57) (115/69 - 134/82)  BP(mean): --  RR: 16 (19 Sep 2021 06:57) (16 - 17)  SpO2: 100% (19 Sep 2021 06:57) (100% - 100%)    CAPILLARY BLOOD GLUCOSE      POCT Blood Glucose.: 127 mg/dL (18 Sep 2021 21:31)  POCT Blood Glucose.: 89 mg/dL (18 Sep 2021 17:36)  POCT Blood Glucose.: 153 mg/dL (18 Sep 2021 12:23)      PHYSICAL EXAM:  GENERAL: NAD,   HEAD:  Atraumatic, Normocephalic  CHEST/LUNG: Clear to auscultation bilaterally; No wheeze  HEART: Regular rate and rhythm; No murmurs, rubs, or gallops  ABDOMEN: Soft, Nontender, Nondistended; Bowel sounds present  EXTREMITIES:  2+ Peripheral Pulses, No clubbing, cyanosis, or edema  PSYCH: AAOx3  NEUROLOGY: non-focal  SKIN: No rashes or lesions       LABS:                        9.1    4.60  )-----------( 247      ( 18 Sep 2021 07:20 )             26.7     Auto Eosinophil # x     / Auto Eosinophil % x     / Auto Neutrophil # x     / Auto Neutrophil % x     / BANDS % x        09-18    134<L>  |  98  |  4<L>  ----------------------------<  154<H>  4.2   |  27  |  0.63  09-18    136  |  101  |  4<L>  ----------------------------<  99  4.1   |  25  |  0.58  09-17    137  |  102  |  4<L>  ----------------------------<  210<H>  4.0   |  27  |  0.61    Ca    8.7      18 Sep 2021 20:42  Mg     1.90     09-18  Phos  2.4     09-18  TPro  6.7  /  Alb  3.5  /  TBili  11.5<H>  /  DBili  x   /  AST  2230<H>  /  ALT  1496<H>  /  AlkPhos  225<H>  09-18  TPro  6.2  /  Alb  3.2<L>  /  TBili  9.9<H>  /  DBili  x   /  AST  1944<H>  /  ALT  1386<H>  /  AlkPhos  220<H>  09-18  TPro  5.9<L>  /  Alb  3.1<L>  /  TBili  8.9<H>  /  DBili  x   /  AST  1539<H>  /  ALT  1253<H>  /  AlkPhos  221<H>  09-17    PT/INR - ( 18 Sep 2021 20:42 )   PT: 17.0 sec;   INR: 1.52 ratio         PTT - ( 18 Sep 2021 07:20 )  PTT:39.7 sec        Lactate, Blood: 1.6 mmol/L (09-18 @ 20:42)  Lactate, Blood: 1.3 mmol/L (09-18 @ 07:20)  Lactate, Blood: 1.4 mmol/L (09-17 @ 15:47)        RADIOLOGY & ADDITIONAL TESTS:    Imaging Personally Reviewed:    Consultant(s) Notes Reviewed:      Care Discussed with Consultants/Other Providers:

## 2021-09-19 NOTE — PROGRESS NOTE ADULT - SUBJECTIVE AND OBJECTIVE BOX
Interval Events:   Patient denies any abdominal pain, nausea /vomiting, diarrhea or melena / bloody bm. Patient mental status stable compared to yesterday.    Hospital Medications:  dextrose 40% Gel 15 Gram(s) Oral once  dextrose 5%. 1000 milliLiter(s) IV Continuous <Continuous>  dextrose 5%. 1000 milliLiter(s) IV Continuous <Continuous>  dextrose 50% Injectable 25 Gram(s) IV Push once  dextrose 50% Injectable 12.5 Gram(s) IV Push once  dextrose 50% Injectable 25 Gram(s) IV Push once  enoxaparin Injectable 40 milliGRAM(s) SubCutaneous daily  glucagon  Injectable 1 milliGRAM(s) IntraMuscular once  influenza   Vaccine 0.5 milliLiter(s) IntraMuscular once  insulin lispro (ADMELOG) corrective regimen sliding scale   SubCutaneous three times a day before meals  insulin lispro (ADMELOG) corrective regimen sliding scale   SubCutaneous at bedtime        PHYSICAL EXAM:   Vital Signs:  Vital Signs Last 24 Hrs  T(C): 36.9 (19 Sep 2021 06:57), Max: 37.7 (18 Sep 2021 21:43)  T(F): 98.5 (19 Sep 2021 06:57), Max: 99.8 (18 Sep 2021 21:43)  HR: 82 (19 Sep 2021 06:57) (81 - 85)  BP: 116/74 (19 Sep 2021 06:57) (115/69 - 134/82)  BP(mean): --  RR: 16 (19 Sep 2021 06:57) (16 - 17)  SpO2: 100% (19 Sep 2021 06:57) (100% - 100%)  Daily     Daily     GENERAL:  NAD, Appears stated age  HEENT:  NC/AT,  conjunctivae clear and pink, sclera -anicteric  CHEST:  Normal Effort, Breath sounds clear  HEART:  RRR, S1 + S2, no murmurs  ABDOMEN:  Soft, non-tender, non-distended, normoactive bowel sounds,  no masses  EXTREMITIES:  no cyanosis or edema  SKIN:  Warm & Dry. No rash or erythema  NEURO:  Alert, oriented, no focal deficit    LABS:                        9.1    4.60  )-----------( 247      ( 18 Sep 2021 07:20 )             26.7       09-18    134<L>  |  98  |  4<L>  ----------------------------<  154<H>  4.2   |  27  |  0.63    Ca    8.7      18 Sep 2021 20:42  Phos  2.4     09-18  Mg     1.90     09-18    TPro  6.7  /  Alb  3.5  /  TBili  11.5<H>  /  DBili  x   /  AST  2230<H>  /  ALT  1496<H>  /  AlkPhos  225<H>  09-18    LIVER FUNCTIONS - ( 18 Sep 2021 20:42 )  Alb: 3.5 g/dL / Pro: 6.7 g/dL / ALK PHOS: 225 U/L / ALT: 1496 U/L / AST: 2230 U/L / GGT: x           PT/INR - ( 18 Sep 2021 20:42 )   PT: 17.0 sec;   INR: 1.52 ratio         PTT - ( 18 Sep 2021 07:20 )  PTT:39.7 sec                            9.1    4.60  )-----------( 247      ( 18 Sep 2021 07:20 )             26.7                         10.4   4.84  )-----------( 236      ( 16 Sep 2021 20:20 )             31.6       Imaging: Images reviewed no new images appreciated.         Interval Events:   Patient denies any abdominal pain, nausea /vomiting, diarrhea or melena / bloody bm.     Hospital Medications:  dextrose 40% Gel 15 Gram(s) Oral once  dextrose 5%. 1000 milliLiter(s) IV Continuous <Continuous>  dextrose 5%. 1000 milliLiter(s) IV Continuous <Continuous>  dextrose 50% Injectable 25 Gram(s) IV Push once  dextrose 50% Injectable 12.5 Gram(s) IV Push once  dextrose 50% Injectable 25 Gram(s) IV Push once  enoxaparin Injectable 40 milliGRAM(s) SubCutaneous daily  glucagon  Injectable 1 milliGRAM(s) IntraMuscular once  influenza   Vaccine 0.5 milliLiter(s) IntraMuscular once  insulin lispro (ADMELOG) corrective regimen sliding scale   SubCutaneous three times a day before meals  insulin lispro (ADMELOG) corrective regimen sliding scale   SubCutaneous at bedtime        PHYSICAL EXAM:   Vital Signs:  Vital Signs Last 24 Hrs  T(C): 36.9 (19 Sep 2021 06:57), Max: 37.7 (18 Sep 2021 21:43)  T(F): 98.5 (19 Sep 2021 06:57), Max: 99.8 (18 Sep 2021 21:43)  HR: 82 (19 Sep 2021 06:57) (81 - 85)  BP: 116/74 (19 Sep 2021 06:57) (115/69 - 134/82)  BP(mean): --  RR: 16 (19 Sep 2021 06:57) (16 - 17)  SpO2: 100% (19 Sep 2021 06:57) (100% - 100%)  Daily     Daily     GENERAL:  NAD, Appears stated age  HEENT:  NC/AT,  conjunctivae clear and pink, sclera icterus  CHEST:  Normal Effort, Breath sounds clear  HEART:  RRR, S1 + S2, no murmurs  ABDOMEN:  Soft, non-tender, non-distended, normoactive bowel sounds,  no masses  EXTREMITIES:  no cyanosis or edema  SKIN:  Warm & Dry. No rash or erythema  NEURO:  Alert, oriented, no focal deficit    LABS:                        9.1    4.60  )-----------( 247      ( 18 Sep 2021 07:20 )             26.7       09-18    134<L>  |  98  |  4<L>  ----------------------------<  154<H>  4.2   |  27  |  0.63    Ca    8.7      18 Sep 2021 20:42  Phos  2.4     09-18  Mg     1.90     09-18    TPro  6.7  /  Alb  3.5  /  TBili  11.5<H>  /  DBili  x   /  AST  2230<H>  /  ALT  1496<H>  /  AlkPhos  225<H>  09-18    LIVER FUNCTIONS - ( 18 Sep 2021 20:42 )  Alb: 3.5 g/dL / Pro: 6.7 g/dL / ALK PHOS: 225 U/L / ALT: 1496 U/L / AST: 2230 U/L / GGT: x           PT/INR - ( 18 Sep 2021 20:42 )   PT: 17.0 sec;   INR: 1.52 ratio         PTT - ( 18 Sep 2021 07:20 )  PTT:39.7 sec                            9.1    4.60  )-----------( 247      ( 18 Sep 2021 07:20 )             26.7                         10.4   4.84  )-----------( 236      ( 16 Sep 2021 20:20 )             31.6       Imaging: Images reviewed no new images appreciated.

## 2021-09-19 NOTE — PROGRESS NOTE ADULT - ATTENDING COMMENTS
Hepatology Staff: Echo Corral MD  I saw and examined the patient along with  Dr. Porter on 09-19-21 @ 12:31  Patient Medical Record, hosptial course was reviewed and summarized as below:    Vitals: Vital Signs Last 24 Hrs  T(C): 36.9 (19 Sep 2021 06:57), Max: 37.7 (18 Sep 2021 21:43)  T(F): 98.5 (19 Sep 2021 06:57), Max: 99.8 (18 Sep 2021 21:43)  HR: 82 (19 Sep 2021 06:57) (81 - 85)  BP: 116/74 (19 Sep 2021 06:57) (115/69 - 134/82)    RR: 16 (19 Sep 2021 06:57) (16 - 17)  SpO2: 100% (19 Sep 2021 06:57) (100% - 100%)    Labs:Creatinine, Serum: 0.63 mg/dL (09-19-21 @ 08:39)  Bilirubin Total, Serum: 11.4 mg/dL (09-19-21 @ 08:39)  INR: 1.58 ratio (09-19-21 @ 08:39)  Creatinine, Serum: 0.63 mg/dL (09-18-21 @ 20:42)  Bilirubin Total, Serum: 11.5 mg/dL (09-18-21 @ 20:42)  INR: 1.52 ratio (09-18-21 @ 20:42)      I/O: I&O's Summary    Nutritional Status:   Albumin, Serum: 3.2 g/dL (09-19-21 @ 08:39)  Albumin, Serum: 3.5 g/dL (09-18-21 @ 20:42)      Recommendations:  This is a 51-year-old female with history of hypertension, diabetes currently admitted with painless jaundice 3 days, elevated liver tests in a hepatitis pattern.    Viral serology for hepatitis B surface antigen and IgM hepatitis B core antibody is positive.  Hepatitis A IgM antibody is negative.  This is concerning for acute hepatitis B.  HBV DNA is pending.    Liver function test and literally feels to have platued.   We will continue to monitor closely with every 12 hours LFTs and INR.   Continue to monitor mental status.  Patient currently remains AAO x3.  No signs of acute liver failure.  Keep low threshold to transfer to [facility] if finding concerning for acute liver failure.  Continue with supportive care.      Plan discussed with Primary team.

## 2021-09-20 ENCOUNTER — TRANSCRIPTION ENCOUNTER (OUTPATIENT)
Age: 51
End: 2021-09-20

## 2021-09-20 LAB
ALBUMIN SERPL ELPH-MCNC: 3.1 G/DL — LOW (ref 3.3–5)
ALBUMIN SERPL ELPH-MCNC: 3.4 G/DL — SIGNIFICANT CHANGE UP (ref 3.3–5)
ALP SERPL-CCNC: 199 U/L — HIGH (ref 40–120)
ALP SERPL-CCNC: 217 U/L — HIGH (ref 40–120)
ALT FLD-CCNC: 1473 U/L — HIGH (ref 4–33)
ALT FLD-CCNC: 1649 U/L — HIGH (ref 4–33)
ANION GAP SERPL CALC-SCNC: 7 MMOL/L — SIGNIFICANT CHANGE UP (ref 7–14)
ANION GAP SERPL CALC-SCNC: 8 MMOL/L — SIGNIFICANT CHANGE UP (ref 7–14)
AST SERPL-CCNC: 2877 U/L — HIGH (ref 4–32)
AST SERPL-CCNC: 3420 U/L — HIGH (ref 4–32)
BILIRUB SERPL-MCNC: 13.9 MG/DL — HIGH (ref 0.2–1.2)
BILIRUB SERPL-MCNC: 14.5 MG/DL — HIGH (ref 0.2–1.2)
BUN SERPL-MCNC: 6 MG/DL — LOW (ref 7–23)
BUN SERPL-MCNC: 6 MG/DL — LOW (ref 7–23)
CALCIUM SERPL-MCNC: 8.7 MG/DL — SIGNIFICANT CHANGE UP (ref 8.4–10.5)
CALCIUM SERPL-MCNC: 8.8 MG/DL — SIGNIFICANT CHANGE UP (ref 8.4–10.5)
CHLORIDE SERPL-SCNC: 98 MMOL/L — SIGNIFICANT CHANGE UP (ref 98–107)
CHLORIDE SERPL-SCNC: 99 MMOL/L — SIGNIFICANT CHANGE UP (ref 98–107)
CO2 SERPL-SCNC: 26 MMOL/L — SIGNIFICANT CHANGE UP (ref 22–31)
CO2 SERPL-SCNC: 27 MMOL/L — SIGNIFICANT CHANGE UP (ref 22–31)
CREAT SERPL-MCNC: 0.59 MG/DL — SIGNIFICANT CHANGE UP (ref 0.5–1.3)
CREAT SERPL-MCNC: 0.66 MG/DL — SIGNIFICANT CHANGE UP (ref 0.5–1.3)
FIBRINOGEN PPP-MCNC: 319 MG/DL — SIGNIFICANT CHANGE UP (ref 290–520)
FIBRINOGEN PPP-MCNC: 337 MG/DL — SIGNIFICANT CHANGE UP (ref 290–520)
GLUCOSE SERPL-MCNC: 141 MG/DL — HIGH (ref 70–99)
GLUCOSE SERPL-MCNC: 173 MG/DL — HIGH (ref 70–99)
HCT VFR BLD CALC: 29.3 % — LOW (ref 34.5–45)
HCV RNA SERPL NAA DL=5-ACNC: SIGNIFICANT CHANGE UP IU/ML
HCV RNA SPEC NAA+PROBE-LOG IU: SIGNIFICANT CHANGE UP LOG10IU/ML
HEMOGLOBIN INTERPRETATION: SIGNIFICANT CHANGE UP
HGB A MFR BLD: 94.3 % — SIGNIFICANT CHANGE UP
HGB A2 MFR BLD: 4.8 % — HIGH (ref 2.4–3.5)
HGB BLD-MCNC: 9.8 G/DL — LOW (ref 11.5–15.5)
HGB F MFR BLD: <1 % — SIGNIFICANT CHANGE UP (ref 0–1.5)
INR BLD: 1.63 RATIO — HIGH (ref 0.88–1.16)
INR BLD: 1.79 RATIO — HIGH (ref 0.88–1.16)
LACTATE SERPL-SCNC: 1.8 MMOL/L — SIGNIFICANT CHANGE UP (ref 0.5–2)
LACTATE SERPL-SCNC: 1.9 MMOL/L — SIGNIFICANT CHANGE UP (ref 0.5–2)
LKM AB SER-ACNC: <20.1 UNITS — SIGNIFICANT CHANGE UP (ref 0–20)
MAGNESIUM SERPL-MCNC: 2.1 MG/DL — SIGNIFICANT CHANGE UP (ref 1.6–2.6)
MAGNESIUM SERPL-MCNC: 2.2 MG/DL — SIGNIFICANT CHANGE UP (ref 1.6–2.6)
MCHC RBC-ENTMCNC: 21.6 PG — LOW (ref 27–34)
MCHC RBC-ENTMCNC: 33.4 GM/DL — SIGNIFICANT CHANGE UP (ref 32–36)
MCV RBC AUTO: 64.5 FL — LOW (ref 80–100)
NRBC # BLD: 0 /100 WBCS — SIGNIFICANT CHANGE UP
NRBC # FLD: 0 K/UL — SIGNIFICANT CHANGE UP
PHOSPHATE SERPL-MCNC: 2.8 MG/DL — SIGNIFICANT CHANGE UP (ref 2.5–4.5)
PHOSPHATE SERPL-MCNC: 3.3 MG/DL — SIGNIFICANT CHANGE UP (ref 2.5–4.5)
PLATELET # BLD AUTO: 344 K/UL — SIGNIFICANT CHANGE UP (ref 150–400)
POTASSIUM SERPL-MCNC: 4.3 MMOL/L — SIGNIFICANT CHANGE UP (ref 3.5–5.3)
POTASSIUM SERPL-MCNC: 4.7 MMOL/L — SIGNIFICANT CHANGE UP (ref 3.5–5.3)
POTASSIUM SERPL-SCNC: 4.3 MMOL/L — SIGNIFICANT CHANGE UP (ref 3.5–5.3)
POTASSIUM SERPL-SCNC: 4.7 MMOL/L — SIGNIFICANT CHANGE UP (ref 3.5–5.3)
PROT SERPL-MCNC: 6.4 G/DL — SIGNIFICANT CHANGE UP (ref 6–8.3)
PROT SERPL-MCNC: 6.8 G/DL — SIGNIFICANT CHANGE UP (ref 6–8.3)
PROTHROM AB SERPL-ACNC: 18.3 SEC — HIGH (ref 10.6–13.6)
PROTHROM AB SERPL-ACNC: 19.8 SEC — HIGH (ref 10.6–13.6)
RBC # BLD: 4.54 M/UL — SIGNIFICANT CHANGE UP (ref 3.8–5.2)
RBC # FLD: 19.4 % — HIGH (ref 10.3–14.5)
SODIUM SERPL-SCNC: 132 MMOL/L — LOW (ref 135–145)
SODIUM SERPL-SCNC: 133 MMOL/L — LOW (ref 135–145)
VZV IGM SER-ACNC: 1.11 INDEX — HIGH (ref 0–0.9)
WBC # BLD: 7.72 K/UL — SIGNIFICANT CHANGE UP (ref 3.8–10.5)
WBC # FLD AUTO: 7.72 K/UL — SIGNIFICANT CHANGE UP (ref 3.8–10.5)

## 2021-09-20 PROCEDURE — 99232 SBSQ HOSP IP/OBS MODERATE 35: CPT | Mod: GC

## 2021-09-20 PROCEDURE — 99233 SBSQ HOSP IP/OBS HIGH 50: CPT | Mod: GC

## 2021-09-20 RX ORDER — SODIUM CHLORIDE 0.65 %
1 AEROSOL, SPRAY (ML) NASAL ONCE
Refills: 0 | Status: COMPLETED | OUTPATIENT
Start: 2021-09-20 | End: 2021-09-20

## 2021-09-20 RX ADMIN — Medication 1 SPRAY(S): at 05:34

## 2021-09-20 RX ADMIN — Medication 1: at 12:34

## 2021-09-20 RX ADMIN — ENOXAPARIN SODIUM 40 MILLIGRAM(S): 100 INJECTION SUBCUTANEOUS at 12:35

## 2021-09-20 RX ADMIN — SIMETHICONE 80 MILLIGRAM(S): 80 TABLET, CHEWABLE ORAL at 05:37

## 2021-09-20 NOTE — DISCHARGE NOTE PROVIDER - NSDCFUADDAPPT_GEN_ALL_CORE_FT
Hepatology will call you to schedule an appointment for follow up Hepatology will call you to schedule an appointment for follow up. Please make sure to follow up with them within 1 week to follow up on your condition.

## 2021-09-20 NOTE — DISCHARGE NOTE PROVIDER - NSDCMRMEDTOKEN_GEN_ALL_CORE_FT
amLODIPine 5 mg oral tablet: 1 tab(s) orally once a day  cetirizine 10 mg oral tablet: 1 tab(s) orally once a day  ferrous sulfate 325 mg oral delayed release tablet: 1 tab(s) orally 2 times a day  ibuprofen 600 mg oral tablet: 1 tab(s) orally every 6 hours, As Needed for Severe Pain  metFORMIN 500 mg oral tablet: 1 tab(s) orally 2 times a day  Vitamin C:   Vitamin D2 1.25 mg (50,000 intl units) oral capsule: 1 cap(s) orally once a week  Zinc:    amLODIPine 5 mg oral tablet: 1 tab(s) orally once a day  cetirizine 10 mg oral tablet: 1 tab(s) orally once a day  ferrous sulfate 325 mg oral delayed release tablet: 1 tab(s) orally 2 times a day  metFORMIN 500 mg oral tablet: 1 tab(s) orally 2 times a day  Vitamin C:   Vitamin D2 1.25 mg (50,000 intl units) oral capsule: 1 cap(s) orally once a week  Zinc:    Vitamin C:   Vitamin D2 1.25 mg (50,000 intl units) oral capsule: 1 cap(s) orally once a week  Zinc:    ocular lubricant ophthalmic solution: 1 drop(s) to each affected eye 2 times a day, As needed, Dry Eyes  Vitamin C:   Vitamin D2 1.25 mg (50,000 intl units) oral capsule: 1 cap(s) orally once a week  Zinc:    ocular lubricant ophthalmic solution: 1 drop(s) to each affected eye 2 times a day, As needed, Dry Eyes   acyclovir 800 mg oral tablet: 1 tab(s) orally 5 times a day  cholestyramine 4 g/9 g oral powder for reconstitution: 4 gram(s) orally 2 times a day     Please do NOT take within 2 hours of taking other medications  tenofovir alafenamide 25 mg oral tablet: 1 tab(s) orally once a day   acyclovir 800 mg oral tablet: 1 tab(s) orally 5 times a day  cholestyramine 4 g/9 g oral powder for reconstitution: 4 gram(s) orally 2 times a day     Please do NOT take within 2 hours of taking other medications  Viread 300 mg oral tablet: 1 tab(s) orally once a day

## 2021-09-20 NOTE — DISCHARGE NOTE PROVIDER - CARE PROVIDER_API CALL
Adalberto Mosley)  Internal Medicine  [id number] [place] [address]  Phone: [phone]  Fax: [phone]  Established Patient  Follow Up Time: 2 weeks

## 2021-09-20 NOTE — DISCHARGE NOTE PROVIDER - HOSPITAL COURSE
Patient is a 51Y female with PMHx of DM, HTN, Iron deficiency anemia, Uterine fibroid presenting with jaundice, anorexia and malaise in setting of recent tattoos and unprotected  Initially presented to [facility] on 9/16 where she was found to have AST 1851, ALT 1780, direct hyperbilirubinemia, INR 1.34. Subsequently transferred to [facility] on 9/17 for further management of acute hepatitis. Abdominal US and CT imaging with evidence of acute hepatitis but no evidence of biliary ductal dilation. A workup for acute hepatitis was sent, and she was found to be HBV core IgM positive. She was seen by hepatology, and her LFTs were closely monitored.      On 9/_/21 she was medically optimized for discharge. She was instructed to follow up with _______. Patient is a 51Y female with PMHx of DM, HTN, Iron deficiency anemia, Uterine fibroid presenting with jaundice, anorexia and malaise in setting of recent tattoos and unprotected  Initially presented to [facility] on 9/16 where she was found to have AST 1851, ALT 1780, direct hyperbilirubinemia, INR 1.34. Subsequently transferred to [facility] on 9/17 for further management of acute hepatitis. Abdominal US and CT imaging with evidence of acute hepatitis but no evidence of biliary ductal dilation. A workup for acute hepatitis (HAV, HBV, HCV, HDV, HEV, HSV, VZV, EBV, CMV, DOUG, anti-smooth muscle, anti-mitochondrial, anti-microsomal liver kidney, immunoglobulin, iron studies, ceruloplasmin) was sent, and she was found to be HBV core IgM positive. She was seen by hepatology, and her LFTs were closely monitored.      On 9/_/21 she was medically optimized for discharge. She was instructed to follow up with _______. Patient is a 51Y female with PMHx of DM, HTN, Iron deficiency anemia, Uterine fibroid presenting with jaundice, anorexia and malaise in setting of recent tattoos and unprotected  Initially presented to [facility] on 9/16 where she was found to have AST 1851, ALT 1780, direct hyperbilirubinemia, INR 1.34. Subsequently transferred to [facility] on 9/17 for further management of acute hepatitis. Abdominal US and CT imaging with evidence of acute hepatitis but no evidence of biliary ductal dilation. A workup for acute hepatitis (HAV, HBV, HCV, HDV, HEV, HSV, VZV, EBV, CMV, DOUG, anti-smooth muscle, anti-mitochondrial, anti-microsomal liver kidney, immunoglobulin, iron studies, ceruloplasmin) was sent, and she was found to be HBV core IgM positive. She was seen by hepatology, and her LFTs were closely monitored.      During her hospital course, she was found to be anemic with Hgb 10.4 with RBC 4.8. Given this discrepancy between her anemia and RBC count, she was evaluated for hemoglobinopathies via hemoglobin electrophoresis. This evaluation showed increased HbA2 4.8% consistent with beta thalassemia minor.     On 9/_/21 she was medically optimized for discharge. She was instructed to follow up with _______. Patient is a 51Y female with PMHx of DM, HTN, Iron deficiency anemia, Uterine fibroid presenting with jaundice, anorexia and malaise in setting of recent tattoos and unprotected  Initially presented to [facility] on 9/16 where she was found to have AST 1851, ALT 1780, direct hyperbilirubinemia, INR 1.34. Subsequently transferred to [facility] on 9/17 for further management of acute hepatitis. Abdominal US and CT imaging with evidence of acute hepatitis but no evidence of biliary ductal dilation. A workup for acute hepatitis (HAV, HBV, HCV, HDV, HEV, HSV, VZV, EBV, CMV, DOUG, anti-smooth muscle, anti-mitochondrial, anti-microsomal liver kidney, immunoglobulin, iron studies, ceruloplasmin) was sent, and she was found to be HBV core IgM positive. HBV DNA PCR was elevated at 17,060 consistent with acute hepatitis B infection. She was seen by hepatology, and started on ________. She was also treated with oral vitamin K for elevated INR.     Her hepatitis workup also showed VZV IgM+. There was low suspicion of active varicella infection given absence of skin lesions. VZV PCR was sent and showed ___________. She also had elevated DOUG.     During her hospital course, she was found to be anemic with Hgb 10.4 with RBC 4.8. Given this discrepancy between her anemia and RBC count, she was evaluated for hemoglobinopathies via hemoglobin electrophoresis. This evaluation showed increased HbA2 4.8% consistent with beta thalassemia minor.     On 9/_/21 she was medically optimized for discharge. She was instructed to follow up with _______. Patient is a 51Y female with PMHx of DM, HTN, Iron deficiency anemia, Uterine fibroid presenting with jaundice, anorexia and malaise in setting of recent tattoos and unprotected sexual intercourse. Initially presented to [facility] on 9/16 where she was found to have AST 1851, ALT 1780, direct hyperbilirubinemia, INR 1.34. Subsequently transferred to [facility] on 9/17 for further management of acute hepatitis. Abdominal US and CT imaging with evidence of acute hepatitis but no evidence of biliary ductal dilation. A workup for acute hepatitis (HAV, HBV, HCV, HDV, HEV, HSV, VZV, EBV, CMV, DOUG, anti-smooth muscle, anti-mitochondrial, anti-microsomal liver kidney, immunoglobulin, iron studies, ceruloplasmin) was sent, and she was found to be HBV core IgM positive. HBV DNA PCR was elevated at 17,060 consistent with acute hepatitis B infection. She was seen by hepatology, and started on ________. She was also treated with oral vitamin K for elevated INR.     Her hepatitis workup also showed VZV IgM+. There was low suspicion of active varicella infection given absence of skin lesions. VZV PCR was sent and showed ___________. She also had elevated DOUG.     During her hospital course, she was found to be anemic with Hgb 10.4 with RBC 4.8. Given this discrepancy between her anemia and RBC count, she was evaluated for hemoglobinopathies via hemoglobin electrophoresis. This evaluation showed increased HbA2 4.8% consistent with beta thalassemia minor.     On 9/_/21 she was medically optimized for discharge. She was instructed to follow up with _______. Patient is a 51Y female with PMHx of DM, HTN, Iron deficiency anemia, Uterine fibroid presenting with jaundice, anorexia and malaise in setting of recent tattoos and unprotected sexual intercourse. Initially presented to [facility] on 9/16 where she was found to have AST 1851, ALT 1780, direct hyperbilirubinemia, INR 1.34. Subsequently transferred to [facility] on 9/17 for further management of acute hepatitis. Abdominal US and CT imaging with evidence of acute hepatitis but no evidence of biliary ductal dilation. A workup for acute hepatitis (HAV, HBV, HCV, HDV, HEV, HSV, VZV, EBV, CMV, DOUG, anti-smooth muscle, anti-mitochondrial, anti-microsomal liver kidney, immunoglobulin, iron studies, ceruloplasmin) was sent, and she was found to be HBV core IgM positive. HBV DNA PCR was elevated at 17,060 consistent with acute hepatitis B infection. She was seen by hepatology, and started on tenofovir alefenamide 25mg daily. She was also treated with a 3 day course of oral vitamin K for elevated INR. She was also treated with cholestyramine 4g BID for cholestatic pruritis.     Her hepatitis workup also showed VZV IgM+. There was low suspicion of active varicella infection given absence of skin lesions. VZV PCR was sent with result still pending. To cover possible varicella hepatitis, she was started on a 7 day course of 800mg acyclovir 5 times per day. She also had elevated DOUG. However, given normal total immunoglobulin IgG level and evidence of viral infection, suspicion for autoimmune hepatitis was low. At time of discharge, AMA and liver kidney antibodies were negative. Anti-Smooth Muscle pending.     During her hospital course, she was found to be anemic with Hgb 10.4 with RBC 4.8. Given this discrepancy between her anemia and RBC count, she was evaluated for hemoglobinopathies via hemoglobin electrophoresis. This evaluation showed increased HbA2 4.8% consistent with beta thalassemia minor.     On 9/24/21 she was medically optimized for discharge. She has a follow up appointment scheduled with hepatology on ___________. She was also instructed to follow up with her PCP Dr. Mosley. Patient is a 51Y female with PMHx of DM, HTN, Iron deficiency anemia, Uterine fibroid presenting with jaundice, anorexia and malaise in setting of recent tattoos and unprotected sexual intercourse. Initially presented to [facility] on 9/16 where she was found to have AST 1851, ALT 1780, direct hyperbilirubinemia, INR 1.34. Subsequently transferred to [facility] on 9/17 for further management of acute hepatitis. Abdominal US and CT imaging with evidence of acute hepatitis but no evidence of biliary ductal dilation. A workup for acute hepatitis (HAV, HBV, HCV, HDV, HEV, HSV, VZV, EBV, CMV, DOUG, anti-smooth muscle, anti-mitochondrial, anti-microsomal liver kidney, immunoglobulin, iron studies, ceruloplasmin) was sent, and she was found to be HBV core IgM positive. HBV DNA PCR was elevated at 17,060 consistent with acute hepatitis B infection. She was seen by hepatology, and started on tenofovir alefenamide 25mg daily. She was also treated with a 3 day course of oral vitamin K for elevated INR. She was also treated with cholestyramine 4g BID for cholestatic pruritis.     Her hepatitis workup also showed VZV IgM+. There was low suspicion of active varicella infection given absence of skin lesions. VZV PCR was sent with result still pending. To cover possible varicella hepatitis, she was started on a 7 day course of 800mg acyclovir 5 times per day. She also had elevated DOUG. However, given normal total immunoglobulin IgG level and evidence of viral infection, suspicion for autoimmune hepatitis was low. At time of discharge, AMA and liver kidney antibodies were negative. Anti-Smooth Muscle pending.     During her hospital course, she was found to be anemic with Hgb 10.4 with RBC 4.8. Given this discrepancy between her anemia and RBC count, she was evaluated for hemoglobinopathies via hemoglobin electrophoresis. This evaluation showed increased HbA2 4.8% consistent with beta thalassemia minor.     On 9/24/21 she was medically optimized for discharge. 1 week follow up with hepatology is arranged with office to call patient. She was also instructed to follow up with her PCP Dr. Mosley.

## 2021-09-20 NOTE — DISCHARGE NOTE PROVIDER - NSFOLLOWUPCLINICS_GEN_ALL_ED_FT
Gastroenterology at [facility]  Gastroenterology  [address] [place], [place] [address]  Phone: [phone]  Fax:   Follow Up Time: 1 week

## 2021-09-20 NOTE — PROGRESS NOTE ADULT - ATTENDING COMMENTS
Patient seen and examined with the liver team. I agree with the plan as above.  Melani feels well. Her liver tests and INR continue to increase with INR 1.63, total bilirubin 14.5, alkaline phosphatase 217, ALT 1649, AST 3420. She has no evidence of encephalopathy. I reviewed her historical labs from 2020. At that time liver enzymes were normal. Labs from 2017 show a negative HBsAb. Her current lab tests may be secondary to acute or reactivation HBV. It is unusual to have a positive HBsAb, negative HBeAg and positive HBeAb so early in the setting of acute HBV. Still waiting for HBVDNA. If HBVDNA positive, I would consider starting anti-viral therapy. Also waiting for delta hepatitis AB and hepatitis E IgM. I would also send HCVRNA  Will continue to follow closely.

## 2021-09-20 NOTE — PROGRESS NOTE ADULT - ASSESSMENT
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 Risk factor includes recent tattoo 3 months ago.  - No hep A/C  - No acute EBV/CMV  - Ceruloplasmin WNL  - Iron sat 11%  - IgG, IgA WNL, IgM elevated    95 % of these is cleared by patient, with 5% having chronic infection.  Up trending INR and elevated Bili c/f worsening synthetic function. Currently mentating normally, not in fulminant liver failure; however if patient's condition worsens she would potentially be a transplant candidate    # microcytic anemia - MCV 65, no h/o GIB.    Recommendations:  -pending Hep B quant DNA, ASMA, AMA, Hep E/D  -Monitor INR, liver enzymes, and Bili q12H. IF INR continues to uptrend (> 2), will need to transfer patient to [facility]; however, seems to be stable with liver enzymes plateauing   -Avoid Hepatotoxic agents  -Rec to test for Hep B active infection/immunization on her partner and vaccinate if needed.  50 yo F w/ PMHx HTN, DM presenting w/ painless jaundice x 3d, found to have severe hepatocellular liver injury, transferred to [facility] for further management    # HBe-Ag negative Immune reactivation vs Acute hep B  - patient w/ hepatocellular liver injury (R factor 19.9)  - work up revealing:  Hep B surface antigen positive,  Hep B IgM core +, Hep Be antibody +,  Hep Be Ag neg, concerning for acute Hepatitis B infection. Risk factor includes recent tattoo 3 months ago.  - No hep A/C  - No acute EBV/CMV  - Ceruloplasmin WNL  - Iron sat 11%  - IgG, IgA WNL, IgM elevated  - Had normal AST/ALT/ALP about one year ago    95 % of these is cleared by patient, with 5% having chronic infection.  Up trending INR and elevated Bili c/f worsening synthetic function. Currently mentating normally, not in fulminant liver failure; however if patient's condition worsens she would potentially be a transplant candidate    # microcytic anemia - MCV 65, no h/o GIB.    Recommendations:  - pending Hep B quant DNA, ASMA, AMA, Hep E/D  - f/u  HCV RNA  -Monitor INR, liver enzymes, and Bili q12H. IF INR continues to uptrend (> 2), will need to transfer patient to [facility]; however, seems to be stable with liver enzymes plateauing   -Avoid Hepatotoxic agents  -Rec to test for Hep B active infection/immunization on her partner and vaccinate if needed.

## 2021-09-20 NOTE — DISCHARGE NOTE PROVIDER - NSDCCPCAREPLAN_GEN_ALL_CORE_FT
PRINCIPAL DISCHARGE DIAGNOSIS  Diagnosis: Acute hepatitis  Assessment and Plan of Treatment: When you came to the hospital, you noticed that your eyes were yellow and that you had been feeling unwell for several days. You were found to have elevated levels of AST and ALT which are lab tests that indicate liver damage. Liver damage can be caused by various drugs, viruses, inflammatory conditions, and metabolic diseases. You were evaluated for these causes and were found to have evidence of Hepatitis B infection. Hepatitis B is a virus that infects the liver. It can be transmitted via infected blood that enters your bloodstream or through unprotected sexual intercourse. Hepatitis B infection can cause yellowing of the eyes and skin, malaise, and loss of appetite. After the initial phase, hepatitis B can cause long term chronic infection in less than 5% of adult patients. Chronic hepatitis B infection can cause liver damage, cirrhosis, and liver cancer. After discharge, you should follow up with hepatology for continued monitoring of your liver function and hepatitis B infection.      SECONDARY DISCHARGE DIAGNOSES  Diagnosis: Asymptomatic bacteriuria  Assessment and Plan of Treatment: When you came to the hospital, your urine was tested, and there was signs of bacteria in your urine. However, you did not have any symptoms of a urinary tract infection such as pain with urination or increaed frequency of urination. Most people with bacteria in their urine but no symptoms don't require treatment. After discharge, if you do develop signs of a urinary tract infection such as pain with urination or increased frequency of urination, you should be see a doctor.    Diagnosis: Diabetes mellitus  Assessment and Plan of Treatment: You have a history of diabetes and were taking metformin at home. In the hospital, your sugar was rony    Diagnosis: Anemia  Assessment and Plan of Treatment:     Diagnosis: Hypertension  Assessment and Plan of Treatment:      PRINCIPAL DISCHARGE DIAGNOSIS  Diagnosis: Acute hepatitis  Assessment and Plan of Treatment: When you came to the hospital, you noticed that your eyes were yellow and that you had been feeling unwell for several days. You were found to have elevated levels of AST and ALT which are lab tests that indicate liver damage. Liver damage can be caused by various drugs, viruses, inflammatory conditions, and metabolic diseases. You were evaluated for these causes and were found to have evidence of Hepatitis B infection. Hepatitis B is a virus that infects the liver. It can be transmitted via infected blood that enters your bloodstream or through unprotected sexual intercourse. Hepatitis B infection can cause yellowing of the eyes and skin, malaise, and loss of appetite. After the initial phase, hepatitis B can cause long term chronic infection in less than 5% of adult patients. Chronic hepatitis B infection can cause liver damage, cirrhosis, and liver cancer. After discharge, you should follow up with hepatology for continued monitoring of your liver function and hepatitis B infection.      SECONDARY DISCHARGE DIAGNOSES  Diagnosis: Asymptomatic bacteriuria  Assessment and Plan of Treatment: When you came to the hospital, your urine was tested, and there was signs of bacteria in your urine. However, you did not have any symptoms of a urinary tract infection such as pain with urination or increaed frequency of urination. Most people with bacteria in their urine but no symptoms don't require treatment. After discharge, if you do develop signs of a urinary tract infection such as pain with urination or increased frequency of urination, you should be see a doctor.    Diagnosis: Diabetes mellitus  Assessment and Plan of Treatment: You have a history of diabetes and were taking metformin at home. In the hospital, your sugar was monitored and received insulin for management of diabetes. After discharge, you should _______ for diabetes.    Diagnosis: Anemia  Assessment and Plan of Treatment: You have a history of iron deficiency anemia and were taking ferrous sulfate at home. In the hospital, we    Diagnosis: Hypertension  Assessment and Plan of Treatment:      PRINCIPAL DISCHARGE DIAGNOSIS  Diagnosis: Acute hepatitis  Assessment and Plan of Treatment: When you came to the hospital, you noticed that your eyes were yellow and that you had been feeling unwell for several days. You were found to have elevated levels of AST and ALT which are lab tests that indicate liver damage. Liver damage can be caused by various drugs, viruses, inflammatory conditions, and metabolic diseases. You were evaluated for these causes and were found to have evidence of Hepatitis B infection. Hepatitis B is a virus that infects the liver. It can be transmitted via infected blood that enters your bloodstream or through unprotected sexual intercourse. Hepatitis B infection can cause yellowing of the eyes and skin, malaise, and loss of appetite. After the initial phase, hepatitis B can cause long term chronic infection in less than 5% of adult patients. Chronic hepatitis B infection can cause liver damage, cirrhosis, and liver cancer. After discharge, you should follow up with hepatology for continued monitoring of your liver function and hepatitis B infection.      SECONDARY DISCHARGE DIAGNOSES  Diagnosis: Asymptomatic bacteriuria  Assessment and Plan of Treatment: When you came to the hospital, your urine was tested, and there was signs of bacteria in your urine. However, you did not have any symptoms of a urinary tract infection such as pain with urination or increaed frequency of urination. Most people with bacteria in their urine but no symptoms don't require treatment. After discharge, if you do develop signs of a urinary tract infection such as pain with urination or increased frequency of urination, you should be see a doctor.    Diagnosis: Diabetes mellitus  Assessment and Plan of Treatment: You have a history of diabetes and were taking metformin at home. In the hospital, your sugar was monitored and received insulin for management of diabetes. After discharge, you should _______ for diabetes.    Diagnosis: Anemia  Assessment and Plan of Treatment: You have a history of anemia and were taking ferrous sulfate at home. In the hospital, we did not give you the ferrous sulfate due to concern that it might exacerbate your liver injury. You were evaluated for causes of anemia in the hospital, and this evaluation showed _________.    Diagnosis: Hypertension  Assessment and Plan of Treatment: You have a history of hypertension and were taking amlodipine at home. In the hospital your blood pressure was within normal range without amlodipine. After discharge, you should follow up with your primary care doctor for further management of hypertension.     PRINCIPAL DISCHARGE DIAGNOSIS  Diagnosis: Acute hepatitis  Assessment and Plan of Treatment: When you came to the hospital, you noticed that your eyes were yellow and that you had been feeling unwell for several days. You were found to have elevated levels of AST and ALT which are lab tests that indicate liver damage. Liver damage can be caused by various drugs, viruses, inflammatory conditions, and metabolic diseases. You were evaluated for these causes and were found to have evidence of Hepatitis B infection. Hepatitis B is a virus that infects the liver. It can be transmitted via infected blood that enters your bloodstream or through unprotected sexual intercourse. Hepatitis B infection can cause yellowing of the eyes and skin, malaise, and loss of appetite. After the initial phase, hepatitis B can cause long term chronic infection in less than 5% of adult patients. Chronic hepatitis B infection can cause liver damage, cirrhosis, and liver cancer. After discharge, you should follow up with hepatology for continued monitoring of your liver function and hepatitis B infection.      SECONDARY DISCHARGE DIAGNOSES  Diagnosis: Asymptomatic bacteriuria  Assessment and Plan of Treatment: When you came to the hospital, your urine was tested, and there was signs of bacteria in your urine. However, you did not have any symptoms of a urinary tract infection such as pain with urination or increaed frequency of urination. Most people with bacteria in their urine but no symptoms don't require treatment. After discharge, if you do develop signs of a urinary tract infection such as pain with urination or increased frequency of urination, you should be see a doctor.    Diagnosis: Diabetes mellitus  Assessment and Plan of Treatment: You have a history of diabetes and were taking metformin at home. In the hospital, your sugar was monitored and received insulin for management of diabetes. After discharge, you should _______ for diabetes.    Diagnosis: Anemia  Assessment and Plan of Treatment: You have a history of anemia and were taking ferrous sulfate at home. In the hospital, we did not give you the ferrous sulfate due to concern that it might exacerbate your liver injury. 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 Beta thalaseemia minor is caused by a mutation in one copy of one of the genes that encodes for hemoglobin protein, the protein that helps your red blood cells carry oxygen. Patients with beta thalassemia minor tend to have lower hemoglobin in their blood, but are generally asymptomatic and do not require treatment. You should follow up with your primary care doctor for continued management of anemia.    Diagnosis: Hypertension  Assessment and Plan of Treatment: You have a history of hypertension and were taking amlodipine at home. In the hospital your blood pressure was within normal range without amlodipine. After discharge, you should follow up with your primary care doctor for further management of hypertension.     PRINCIPAL DISCHARGE DIAGNOSIS  Diagnosis: Acute hepatitis  Assessment and Plan of Treatment: When you came to the hospital, you noticed that your eyes were yellow and that you had been feeling unwell for several days. You were found to have elevated levels of AST and ALT which are lab tests that indicate liver damage. Liver damage can be caused by various drugs, viruses, inflammatory conditions, and metabolic diseases. You were evaluated for these causes and were found to have evidence of Hepatitis B infection. Hepatitis B is a virus that infects the liver. It can be transmitted via infected blood that enters your bloodstream or through unprotected sexual intercourse. Hepatitis B infection can cause yellowing of the eyes and skin, malaise, and loss of appetite. After the initial phase, hepatitis B can cause long term chronic infection in less than 5% of adult patients. Chronic hepatitis B infection can cause liver damage, cirrhosis, and liver cancer. After discharge, you should follow up with hepatology for continued monitoring of your liver function and hepatitis B infection. If you have had unprotected sexual activity with any partners recently, they should be tested for Hepatitis B and vaccinated if they aren't already.      SECONDARY DISCHARGE DIAGNOSES  Diagnosis: Asymptomatic bacteriuria  Assessment and Plan of Treatment: When you came to the hospital, your urine was tested, and there was signs of bacteria in your urine. However, you did not have any symptoms of a urinary tract infection such as pain with urination or increaed frequency of urination. Most people with bacteria in their urine but no symptoms don't require treatment. After discharge, if you do develop signs of a urinary tract infection such as pain with urination or increased frequency of urination, you should be see a doctor.    Diagnosis: Diabetes mellitus  Assessment and Plan of Treatment: You have a history of diabetes and were taking metformin at home. In the hospital, your sugar was monitored and received insulin for management of diabetes. After discharge, you should _______ for diabetes.    Diagnosis: Anemia  Assessment and Plan of Treatment: You have a history of anemia and were taking ferrous sulfate at home. In the hospital, we did not give you the ferrous sulfate due to concern that it might exacerbate your liver injury. 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 Beta thalaseemia minor is caused by a mutation in one copy of one of the genes that encodes for hemoglobin protein, the protein that helps your red blood cells carry oxygen. Patients with beta thalassemia minor tend to have lower hemoglobin in their blood, but are generally asymptomatic and do not require treatment. You should follow up with your primary care doctor for continued management of anemia.    Diagnosis: Hypertension  Assessment and Plan of Treatment: You have a history of hypertension and were taking amlodipine at home. In the hospital your blood pressure was within normal range without amlodipine. After discharge, you should follow up with your primary care doctor for further management of hypertension.     PRINCIPAL DISCHARGE DIAGNOSIS  Diagnosis: Acute hepatitis  Assessment and Plan of Treatment: When you came to the hospital, you noticed that your eyes were yellow and that you had been feeling unwell for several days. You were found to have elevated levels of AST and ALT which are lab tests that indicate liver damage. Liver damage can be caused by various drugs, viruses, inflammatory conditions, and metabolic diseases. You were evaluated for these causes and were found to have evidence of Hepatitis B infection. Hepatitis B is a virus that infects the liver. It can be transmitted via infected blood that enters your bloodstream or through unprotected sexual intercourse. Hepatitis B infection can cause yellowing of the eyes and skin, malaise, and loss of appetite. After the initial phase, hepatitis B can cause long term chronic infection in less than 5% of adult patients. Chronic hepatitis B infection can cause liver damage, cirrhosis, and liver cancer. You were started on a medication called tenofovir alefenamide for hepatitis B. This is an antiviral medicaiton. After discharge, you should continue taking this medication and follow up with hepatology for continued monitoring of your liver function and hepatitis B infection. If you have had unprotected sexual activity with any partners recently, they should be tested for Hepatitis B and vaccinated if they aren't already.  You were also found to have antibodies suggestive of infection with the Varicella Zoster virus. This virus is most commonly known to cause chicken pox, but can sometimes cause liver injury. The suspicion for serious varicella zoster infection was low, as this virus tends to cause a rash on the skin. You were treated with acyclovir, an antiviral medication that works against the Varicella virus. After discharge, you should continue taking this medication as prescribed.      SECONDARY DISCHARGE DIAGNOSES  Diagnosis: Asymptomatic bacteriuria  Assessment and Plan of Treatment: When you came to the hospital, your urine was tested, and there was signs of bacteria in your urine. However, you did not have any symptoms of a urinary tract infection such as pain with urination or increaed frequency of urination. Most people with bacteria in their urine but no symptoms don't require treatment. After discharge, if you do develop signs of a urinary tract infection such as pain with urination or increased frequency of urination, you should be see a doctor.    Diagnosis: Diabetes mellitus  Assessment and Plan of Treatment: You have a history of diabetes and were taking metformin at home. In the hospital, your sugar was monitored and received insulin for management of diabetes. After discharge, you should _______ for diabetes.    Diagnosis: Anemia  Assessment and Plan of Treatment: You have a history of anemia and were taking ferrous sulfate at home. In the hospital, we did not give you the ferrous sulfate due to concern that it might exacerbate your liver injury. 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 Beta thalaseemia minor is caused by a mutation in one copy of one of the genes that encodes for hemoglobin protein, the protein that helps your red blood cells carry oxygen. Patients with beta thalassemia minor tend to have lower hemoglobin in their blood, but are generally asymptomatic and do not require treatment. You should follow up with your primary care doctor for continued management of anemia.    Diagnosis: Hypertension  Assessment and Plan of Treatment: You have a history of hypertension and were taking amlodipine at home. In the hospital your blood pressure was within normal range without amlodipine. After discharge, you should follow up with your primary care doctor for further management of hypertension.    Diagnosis: Cholestatic pruritus  Assessment and Plan of Treatment: In the hospital, you started to feel itchy. This was most likely caused by elevated levels of bilirubin, a waste product that the liver normally excretes. You were treated with a medication called cholestyramine, which should help with bilirubin removal from the body and reduce the itchiness. You should continue to take this medication as prescribed for itchiness. Do not take this medication within 2 hours of other medication, as it can affect the absorption of other medication.     PRINCIPAL DISCHARGE DIAGNOSIS  Diagnosis: Acute hepatitis  Assessment and Plan of Treatment: When you came to the hospital, you noticed that your eyes were yellow and that you had been feeling unwell for several days. You were found to have elevated levels of AST and ALT which are lab tests that indicate liver damage. Liver damage can be caused by various drugs, viruses, inflammatory conditions, and metabolic diseases. You were evaluated for these causes and were found to have evidence of Hepatitis B infection. Hepatitis B is a virus that infects the liver. It can be transmitted via infected blood that enters your bloodstream or through unprotected sexual intercourse. Hepatitis B infection can cause yellowing of the eyes and skin, malaise, and loss of appetite. After the initial phase, hepatitis B can cause long term chronic infection in less than 5% of adult patients. Chronic hepatitis B infection can cause liver damage, cirrhosis, and liver cancer. You were started on a medication called tenofovir alefenamide for hepatitis B. This is an antiviral medicaiton. After discharge, you should continue taking this medication and follow up with hepatology for continued monitoring of your liver function and hepatitis B infection. If you have had unprotected sexual activity with any partners recently, they should be tested for Hepatitis B and vaccinated if they aren't already.  You were also found to have antibodies suggestive of infection with the Varicella Zoster virus. This virus is most commonly known to cause chicken pox, but can sometimes cause liver injury. The suspicion for serious varicella zoster infection was low, as this virus tends to cause a rash on the skin. You were treated with acyclovir, an antiviral medication that works against the Varicella virus. After discharge, you should continue taking this medication as prescribed.  After leaving the hospital, you should avoid taking substances that may further damage the liver given your recent liver damage. This includes avoiding alcohol intake and tylenol use.      SECONDARY DISCHARGE DIAGNOSES  Diagnosis: Asymptomatic bacteriuria  Assessment and Plan of Treatment: When you came to the hospital, your urine was tested, and there was signs of bacteria in your urine. However, you did not have any symptoms of a urinary tract infection such as pain with urination or increaed frequency of urination. Most people with bacteria in their urine but no symptoms don't require treatment. After discharge, if you do develop signs of a urinary tract infection such as pain with urination or increased frequency of urination, you should be see a doctor.    Diagnosis: Diabetes mellitus  Assessment and Plan of Treatment: You have a history of diabetes and were taking metformin at home. In the hospital, your sugar was monitored and received insulin for management of diabetes. After discharge, you should _______ for diabetes.    Diagnosis: Anemia  Assessment and Plan of Treatment: You have a history of anemia and were taking ferrous sulfate at home. In the hospital, we did not give you the ferrous sulfate due to concern that it might exacerbate your liver injury. 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 Beta thalaseemia minor is caused by a mutation in one copy of one of the genes that encodes for hemoglobin protein, the protein that helps your red blood cells carry oxygen. Patients with beta thalassemia minor tend to have lower hemoglobin in their blood, but are generally asymptomatic and do not require treatment. You should follow up with your primary care doctor for continued management of anemia.    Diagnosis: Hypertension  Assessment and Plan of Treatment: You have a history of hypertension and were taking amlodipine at home. In the hospital your blood pressure was within normal range without amlodipine. After discharge, you should follow up with your primary care doctor for further management of hypertension.    Diagnosis: Cholestatic pruritus  Assessment and Plan of Treatment: In the hospital, you started to feel itchy. This was most likely caused by elevated levels of bilirubin, a waste product that the liver normally excretes. You were treated with a medication called cholestyramine, which should help with bilirubin removal from the body and reduce the itchiness. You should continue to take this medication as prescribed for itchiness. Do not take this medication within 2 hours of other medication, as it can affect the absorption of other medication.     PRINCIPAL DISCHARGE DIAGNOSIS  Diagnosis: Acute hepatitis  Assessment and Plan of Treatment: When you came to the hospital, you noticed that your eyes were yellow and that you had been feeling unwell for several days. You were found to have elevated levels of AST and ALT which are lab tests that indicate liver damage. Liver damage can be caused by various drugs, viruses, inflammatory conditions, and metabolic diseases. You were evaluated for these causes and were found to have evidence of Hepatitis B infection. Hepatitis B is a virus that infects the liver. It can be transmitted via infected blood that enters your bloodstream or through unprotected sexual intercourse. Hepatitis B infection can cause yellowing of the eyes and skin, malaise, and loss of appetite. After the initial phase, hepatitis B can cause long term chronic infection in less than 5% of adult patients. Chronic hepatitis B infection can cause liver damage, cirrhosis, and liver cancer. You were started on a medication called tenofovir alefenamide for hepatitis B. This is an antiviral medicaiton. After discharge, you should continue taking this medication and follow up with hepatology for continued monitoring of your liver function and hepatitis B infection. If you have had unprotected sexual activity with any partners recently, they should be tested for Hepatitis B and vaccinated if they aren't already.  You were also found to have antibodies suggestive of infection with the Varicella Zoster virus. This virus is most commonly known to cause chicken pox, but can sometimes cause liver injury. The suspicion for serious varicella zoster infection was low, as this virus tends to cause a rash on the skin. You were treated with acyclovir, an antiviral medication that works against the Varicella virus. After discharge, you should continue taking this medication as prescribed.  After leaving the hospital, you should avoid taking substances that may further damage the liver given your recent liver damage. This includes avoiding alcohol intake and tylenol use.      SECONDARY DISCHARGE DIAGNOSES  Diagnosis: Asymptomatic bacteriuria  Assessment and Plan of Treatment: When you came to the hospital, your urine was tested, and there was signs of bacteria in your urine. However, you did not have any symptoms of a urinary tract infection such as pain with urination or increaed frequency of urination. Most people with bacteria in their urine but no symptoms don't require treatment. After discharge, if you do develop signs of a urinary tract infection such as pain with urination or increased frequency of urination, you should be see a doctor.    Diagnosis: Diabetes mellitus  Assessment and Plan of Treatment: You have a history of diabetes and were taking metformin at home. In the hospital, your sugar was monitored and received insulin for management of diabetes. After discharge, you should follow up with your PCP for further management of diabetes.    Diagnosis: Anemia  Assessment and Plan of Treatment: You have a history of anemia and were taking ferrous sulfate at home. In the hospital, we did not give you the ferrous sulfate due to concern that it might exacerbate your liver injury. 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 Beta thalaseemia minor is caused by a mutation in one copy of one of the genes that encodes for hemoglobin protein, the protein that helps your red blood cells carry oxygen. Patients with beta thalassemia minor tend to have lower hemoglobin in their blood, but are generally asymptomatic and do not require treatment. You should follow up with your primary care doctor for continued management of anemia.    Diagnosis: Hypertension  Assessment and Plan of Treatment: You have a history of hypertension and were taking amlodipine at home. In the hospital your blood pressure was within normal range without amlodipine. After discharge, you should follow up with your primary care doctor for further management of hypertension.    Diagnosis: Cholestatic pruritus  Assessment and Plan of Treatment: In the hospital, you started to feel itchy. This was most likely caused by elevated levels of bilirubin, a waste product that the liver normally excretes. You were treated with a medication called cholestyramine, which should help with bilirubin removal from the body and reduce the itchiness. You should continue to take this medication as prescribed for itchiness. Do not take this medication within 2 hours of other medication, as it can affect the absorption of other medication.     PRINCIPAL DISCHARGE DIAGNOSIS  Diagnosis: Acute hepatitis  Assessment and Plan of Treatment: When you came to the hospital, you noticed that your eyes were yellow and that you had been feeling unwell for several days. You were found to have elevated levels of AST and ALT which are lab tests that indicate liver damage. Liver damage can be caused by various drugs, viruses, inflammatory conditions, and metabolic diseases. You were evaluated for these causes and were found to have evidence of Hepatitis B infection. Hepatitis B is a virus that infects the liver. It can be transmitted via infected blood that enters your bloodstream or through unprotected sexual intercourse. Hepatitis B infection can cause yellowing of the eyes and skin, malaise, and loss of appetite. After the initial phase, hepatitis B can cause long term chronic infection in less than 5% of adult patients. Chronic hepatitis B infection can cause liver damage, cirrhosis, and liver cancer. You were started on a medication called tenofovir alefenamide for hepatitis B. This is an antiviral medicaiton. After discharge, you should continue taking this medication and follow up with hepatology for continued monitoring of your liver function and hepatitis B infection. If you have had unprotected sexual activity with any partners recently, they should be tested for Hepatitis B and vaccinated if they aren't already.  You were also found to have antibodies suggestive of infection with the Varicella Zoster virus. This virus is most commonly known to cause chicken pox, but can sometimes cause liver injury. The suspicion for serious varicella zoster infection was low, as this virus tends to cause a rash on the skin. You were treated with acyclovir, an antiviral medication that works against the Varicella virus. After discharge, you should continue taking this medication as prescribed.  After leaving the hospital, you should avoid taking substances that may further damage the liver given your recent liver damage. This includes avoiding alcohol intake and tylenol use.      SECONDARY DISCHARGE DIAGNOSES  Diagnosis: Asymptomatic bacteriuria  Assessment and Plan of Treatment: When you came to the hospital, your urine was tested, and there was signs of bacteria in your urine. However, you did not have any symptoms of a urinary tract infection such as pain with urination or increaed frequency of urination. Most people with bacteria in their urine but no symptoms don't require treatment. After discharge, if you do develop signs of a urinary tract infection such as pain with urination or increased frequency of urination, you should be see a doctor.  Please stop taking all your home medications until you follow up with your primary care doctor to avoid worsening your liver injury.    Diagnosis: Diabetes mellitus  Assessment and Plan of Treatment: You have a history of diabetes and were taking metformin at home. In the hospital, your sugar was monitored and received insulin for management of diabetes. After discharge, you should follow up with your PCP for further management of diabetes.    Diagnosis: Anemia  Assessment and Plan of Treatment: You have a history of anemia and were taking ferrous sulfate at home. In the hospital, we did not give you the ferrous sulfate due to concern that it might exacerbate your liver injury. 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 Beta thalaseemia minor is caused by a mutation in one copy of one of the genes that encodes for hemoglobin protein, the protein that helps your red blood cells carry oxygen. Patients with beta thalassemia minor tend to have lower hemoglobin in their blood, but are generally asymptomatic and do not require treatment. You should follow up with your primary care doctor for continued management of anemia.    Diagnosis: Hypertension  Assessment and Plan of Treatment: You have a history of hypertension and were taking amlodipine at home. In the hospital your blood pressure was within normal range without amlodipine. After discharge, you should follow up with your primary care doctor for further management of hypertension.    Diagnosis: Cholestatic pruritus  Assessment and Plan of Treatment: In the hospital, you started to feel itchy. This was most likely caused by elevated levels of bilirubin, a waste product that the liver normally excretes. You were treated with a medication called cholestyramine, which should help with bilirubin removal from the body and reduce the itchiness. You should continue to take this medication as prescribed for itchiness. Do not take this medication within 2 hours of other medication, as it can affect the absorption of other medication.

## 2021-09-20 NOTE — PROGRESS NOTE ADULT - SUBJECTIVE AND OBJECTIVE BOX
Gastroenterology progress note:     Patient is a 51y old  Female who presents with a chief complaint of Elevated Liver Enzymes (20 Sep 2021 08:03)       Admitted on: 09-17-21    We are following the patient for: abnormal liver tests     Interval History:  No acute event ON, can tolerate PO intake, no abdominal pain      PAST MEDICAL & SURGICAL HISTORY:  Iron deficiency anemia    HTN (hypertension)    DM (diabetes mellitus)    Fibroid uterus    Aborted ectopic pregnancy    H/O breast augmentation        MEDICATIONS  (STANDING):  dextrose 40% Gel 15 Gram(s) Oral once  dextrose 5%. 1000 milliLiter(s) (50 mL/Hr) IV Continuous <Continuous>  dextrose 5%. 1000 milliLiter(s) (100 mL/Hr) IV Continuous <Continuous>  dextrose 50% Injectable 25 Gram(s) IV Push once  dextrose 50% Injectable 12.5 Gram(s) IV Push once  dextrose 50% Injectable 25 Gram(s) IV Push once  enoxaparin Injectable 40 milliGRAM(s) SubCutaneous daily  glucagon  Injectable 1 milliGRAM(s) IntraMuscular once  influenza   Vaccine 0.5 milliLiter(s) IntraMuscular once  insulin lispro (ADMELOG) corrective regimen sliding scale   SubCutaneous three times a day before meals  insulin lispro (ADMELOG) corrective regimen sliding scale   SubCutaneous at bedtime    MEDICATIONS  (PRN):      Allergies  lisinopril (Swelling)      Review of Systems:   Constitutional: Ne Fever, No chills, No weakness  ENT: No visual changes, No throat pain  Cardiovascular:  No Chest Pain, No Palpitations  Respiratory:  No Cough, No Dyspnea  Gastrointestinal:  As described in HPI  Neurological: No numbness or weakness  Skin: No rash, no itching    Physical Examination:  T(C): 37.5 (09-19-21 @ 22:38), Max: 37.5 (09-19-21 @ 22:38)  HR: 94 (09-19-21 @ 22:38) (83 - 94)  BP: 106/64 (09-19-21 @ 22:38) (106/64 - 110/73)  RR: 18 (09-19-21 @ 22:38) (16 - 18)  SpO2: 100% (09-19-21 @ 22:38) (100% - 100%)      Constitutional: No acute distress.  Respiratory:  No signs of respiratory distress. Lung sounds are clear bilaterally.  Cardiovascular:  S1 S2, Regular rate and rhythm.  Abdominal: Abdomen is soft, symmetric, and non-tender without distention. There are no visible lesions. Bowel sounds are present and normoactive in all four quadrants. No masses, hepatomegaly, or splenomegaly are noted.   Skin: No rashes, Jaundice.  Neurology: AAOX3, Non-focal  Skin: No rash, No excoriation  Vascular: No varicose vein, No cyanosis, No edema        Data: (reviewed by attending)                        9.8    7.72  )-----------( 344      ( 20 Sep 2021 07:48 )             29.3     Hgb trend:  9.8  09-20-21 @ 07:48  9.1  09-18-21 @ 07:20      09-19    135  |  98  |  5<L>  ----------------------------<  127<H>  4.5   |  27  |  0.71    Ca    8.6      19 Sep 2021 21:18  Phos  3.0     09-19  Mg     2.00     09-19    TPro  6.6  /  Alb  3.4  /  TBili  13.0<H>  /  DBili  x   /  AST  2958<H>  /  ALT  1590<H>  /  AlkPhos  215<H>  09-19    Liver panel trend:  TBili 13.0   /   AST 2958   /   ALT 1590   /   AlkP 215   /   Tptn 6.6   /   Alb 3.4    /   DBili --      09-19  TBili 11.4   /   AST 2359   /   ALT 1385   /   AlkP 202   /   Tptn 6.0   /   Alb 3.2    /   DBili --      09-19  TBili 11.5   /   AST 2230   /   ALT 1496   /   AlkP 225   /   Tptn 6.7   /   Alb 3.5    /   DBili --      09-18  TBili 9.9   /   AST 1944   /   ALT 1386   /   AlkP 220   /   Tptn 6.2   /   Alb 3.2    /   DBili --      09-18  TBili 8.9   /   AST 1539   /   ALT 1253   /   AlkP 221   /   Tptn 5.9   /   Alb 3.1    /   DBili --      09-17  TBili 8.3   /   AST 1366   /   ALT 1261   /   AlkP 223   /   Tptn 5.7   /   Alb 3.0    /   DBili --      09-17  TBili 8.5   /   AST --   /   ALT --   /   AlkP --   /   Tptn --   /   Alb --    /   DBili 7.10      09-16  TBili 9.7   /   AST 1851   /   ALT 1780   /   AlkP 269   /   Tptn 7.1   /   Alb 2.9    /   DBili --      09-16      PT/INR - ( 19 Sep 2021 08:39 )   PT: 17.8 sec;   INR: 1.58 ratio             < from: US Abdomen Limited (09.16.21 @ 21:16) >  FINDINGS: Grayscale and duplex evaluation of the right upper quadrant was performed.    LIVER: Hepatomegaly with a craniocaudad length of 19 cm. Hepatopedal blood flow in the main portal vein.  BILE DUCTS: No intrahepatic biliary ductal dilatation is noted. The common bile duct measures 2 mm.  GALLBLADDER:Thickened appearance of the gallbladder wall despite underdistention. Wall measures 9 mm. Mild pericholecystic fluid. No stones are seen. Sonographic George's sign is negative.  PANCREAS: Visualized portions are within normal limits.  RIGHT KIDNEY: 11 cm in length. No hydronephrosis or renal calculi.    IMPRESSION: Marked gallbladder wall thickening and pericholecystic fluid. Sonographic George's sign is negative.    Hepatomegaly.    < end of copied text >      < from: CT Abdomen and Pelvis w/ IV Cont (09.17.21 @ 07:32) >    IMPRESSION:  Enlarged liver with periportal edema and trace perihepatic ascites. Nonspecific thickening of the gallbladder wall. Findings raise a question of acute hepatitis. Correlate with clinical and laboratory findings.    Enlarged fibroid uterus.    < end of copied text >         Hepatology progress note:     Patient is a 51y old  Female who presents with a chief complaint of Elevated Liver Enzymes (20 Sep 2021 08:03)       Admitted on: 09-17-21    We are following the patient for: abnormal liver tests     Interval History:  No acute event ON, can tolerate PO intake, no abdominal pain      PAST MEDICAL & SURGICAL HISTORY:  Iron deficiency anemia    HTN (hypertension)    DM (diabetes mellitus)    Fibroid uterus    Aborted ectopic pregnancy    H/O breast augmentation        MEDICATIONS  (STANDING):  dextrose 40% Gel 15 Gram(s) Oral once  dextrose 5%. 1000 milliLiter(s) (50 mL/Hr) IV Continuous <Continuous>  dextrose 5%. 1000 milliLiter(s) (100 mL/Hr) IV Continuous <Continuous>  dextrose 50% Injectable 25 Gram(s) IV Push once  dextrose 50% Injectable 12.5 Gram(s) IV Push once  dextrose 50% Injectable 25 Gram(s) IV Push once  enoxaparin Injectable 40 milliGRAM(s) SubCutaneous daily  glucagon  Injectable 1 milliGRAM(s) IntraMuscular once  influenza   Vaccine 0.5 milliLiter(s) IntraMuscular once  insulin lispro (ADMELOG) corrective regimen sliding scale   SubCutaneous three times a day before meals  insulin lispro (ADMELOG) corrective regimen sliding scale   SubCutaneous at bedtime    MEDICATIONS  (PRN):      Allergies  lisinopril (Swelling)      Review of Systems:   Constitutional: Ne Fever, No chills, No weakness  ENT: No visual changes, No throat pain  Cardiovascular:  No Chest Pain, No Palpitations  Respiratory:  No Cough, No Dyspnea  Gastrointestinal:  As described in HPI  Neurological: No numbness or weakness  Skin: No rash, no itching    Physical Examination:  T(C): 37.5 (09-19-21 @ 22:38), Max: 37.5 (09-19-21 @ 22:38)  HR: 94 (09-19-21 @ 22:38) (83 - 94)  BP: 106/64 (09-19-21 @ 22:38) (106/64 - 110/73)  RR: 18 (09-19-21 @ 22:38) (16 - 18)  SpO2: 100% (09-19-21 @ 22:38) (100% - 100%)      Constitutional: No acute distress.  Respiratory:  No signs of respiratory distress. Lung sounds are clear bilaterally.  Cardiovascular:  S1 S2, Regular rate and rhythm.  Abdominal: Abdomen is soft, symmetric, and non-tender without distention. There are no visible lesions. Bowel sounds are present and normoactive in all four quadrants. No masses, hepatomegaly, or splenomegaly are noted.   Skin: No rashes, Jaundice.  Neurology: AAOX3, Non-focal  Skin: No rash, No excoriation  Vascular: No varicose vein, No cyanosis, No edema        Data: (reviewed by attending)                        9.8    7.72  )-----------( 344      ( 20 Sep 2021 07:48 )             29.3     Hgb trend:  9.8  09-20-21 @ 07:48  9.1  09-18-21 @ 07:20      09-19    135  |  98  |  5<L>  ----------------------------<  127<H>  4.5   |  27  |  0.71    Ca    8.6      19 Sep 2021 21:18  Phos  3.0     09-19  Mg     2.00     09-19    TPro  6.6  /  Alb  3.4  /  TBili  13.0<H>  /  DBili  x   /  AST  2958<H>  /  ALT  1590<H>  /  AlkPhos  215<H>  09-19    Liver panel trend:  TBili 13.0   /   AST 2958   /   ALT 1590   /   AlkP 215   /   Tptn 6.6   /   Alb 3.4    /   DBili --      09-19  TBili 11.4   /   AST 2359   /   ALT 1385   /   AlkP 202   /   Tptn 6.0   /   Alb 3.2    /   DBili --      09-19  TBili 11.5   /   AST 2230   /   ALT 1496   /   AlkP 225   /   Tptn 6.7   /   Alb 3.5    /   DBili --      09-18  TBili 9.9   /   AST 1944   /   ALT 1386   /   AlkP 220   /   Tptn 6.2   /   Alb 3.2    /   DBili --      09-18  TBili 8.9   /   AST 1539   /   ALT 1253   /   AlkP 221   /   Tptn 5.9   /   Alb 3.1    /   DBili --      09-17  TBili 8.3   /   AST 1366   /   ALT 1261   /   AlkP 223   /   Tptn 5.7   /   Alb 3.0    /   DBili --      09-17  TBili 8.5   /   AST --   /   ALT --   /   AlkP --   /   Tptn --   /   Alb --    /   DBili 7.10      09-16  TBili 9.7   /   AST 1851   /   ALT 1780   /   AlkP 269   /   Tptn 7.1   /   Alb 2.9    /   DBili --      09-16      PT/INR - ( 19 Sep 2021 08:39 )   PT: 17.8 sec;   INR: 1.58 ratio             < from: US Abdomen Limited (09.16.21 @ 21:16) >  FINDINGS: Grayscale and duplex evaluation of the right upper quadrant was performed.    LIVER: Hepatomegaly with a craniocaudad length of 19 cm. Hepatopedal blood flow in the main portal vein.  BILE DUCTS: No intrahepatic biliary ductal dilatation is noted. The common bile duct measures 2 mm.  GALLBLADDER:Thickened appearance of the gallbladder wall despite underdistention. Wall measures 9 mm. Mild pericholecystic fluid. No stones are seen. Sonographic George's sign is negative.  PANCREAS: Visualized portions are within normal limits.  RIGHT KIDNEY: 11 cm in length. No hydronephrosis or renal calculi.    IMPRESSION: Marked gallbladder wall thickening and pericholecystic fluid. Sonographic George's sign is negative.    Hepatomegaly.    < end of copied text >      < from: CT Abdomen and Pelvis w/ IV Cont (09.17.21 @ 07:32) >    IMPRESSION:  Enlarged liver with periportal edema and trace perihepatic ascites. Nonspecific thickening of the gallbladder wall. Findings raise a question of acute hepatitis. Correlate with clinical and laboratory findings.    Enlarged fibroid uterus.    < end of copied text >

## 2021-09-20 NOTE — PROGRESS NOTE ADULT - SUBJECTIVE AND OBJECTIVE BOX
Patient is a 51y old  Female who presents with a chief complaint of Elevated Liver Enzymes (19 Sep 2021 08:38)      SUBJECTIVE / OVERNIGHT EVENTS: No overnight events    This morning patient is feeling well. Reports anorexia but has been pushing herself to eat. Also reports continued malaise and intermittent generalized discomfort. Has been having normal bowel movements. Denies SOB, abdominal pain, nausea, vomiting, dysuria, urinary frequency, rash.     MEDICATIONS  (STANDING):  dextrose 40% Gel 15 Gram(s) Oral once  dextrose 5%. 1000 milliLiter(s) (50 mL/Hr) IV Continuous <Continuous>  dextrose 5%. 1000 milliLiter(s) (100 mL/Hr) IV Continuous <Continuous>  dextrose 50% Injectable 25 Gram(s) IV Push once  dextrose 50% Injectable 12.5 Gram(s) IV Push once  dextrose 50% Injectable 25 Gram(s) IV Push once  enoxaparin Injectable 40 milliGRAM(s) SubCutaneous daily  glucagon  Injectable 1 milliGRAM(s) IntraMuscular once  influenza   Vaccine 0.5 milliLiter(s) IntraMuscular once  insulin lispro (ADMELOG) corrective regimen sliding scale   SubCutaneous three times a day before meals  insulin lispro (ADMELOG) corrective regimen sliding scale   SubCutaneous at bedtime    MEDICATIONS  (PRN):      PHYSICAL EXAM:  Vital Signs Last 24 Hrs  T(C): 37.5 (19 Sep 2021 22:38), Max: 37.5 (19 Sep 2021 22:38)  T(F): 99.5 (19 Sep 2021 22:38), Max: 99.5 (19 Sep 2021 22:38)  HR: 94 (19 Sep 2021 22:38) (83 - 94)  BP: 106/64 (19 Sep 2021 22:38) (106/64 - 110/73)  BP(mean): --  RR: 18 (19 Sep 2021 22:38) (16 - 18)  SpO2: 100% (19 Sep 2021 22:38) (100% - 100%)    CONSTITUTIONAL: NAD, well-developed  EYES: +scleral icterus  RESPIRATORY: Normal WOB; lungs are CTA b/l  CARDIOVASCULAR: RRR; S1/S2 present; no m/r/g; No LE edema  ABDOMEN: Soft, normoactive BS, no rebound/guarding; +hepatomegaly, +tenderness to percussion RUQ  MUSCULOSKELETAL:  moving all extremities  NEUROLOGY: awake, A&O to person, place, and time, no asterixis   SKIN: No rashes  ----  I&O's Summary    ----  LABS:    ----  09-19    135  |  98  |  5<L>  ----------------------------<  127<H>  4.5   |  27  |  0.71    Ca    8.6      19 Sep 2021 21:18  Phos  3.0     09-19  Mg     2.00     09-19    TPro  6.6  /  Alb  3.4  /  TBili  13.0<H>  /  DBili  x   /  AST  2958<H>  /  ALT  1590<H>  /  AlkPhos  215<H>  09-19    ----    RADIOLOGY & ADDITIONAL TESTS:  Results Reviewed:   Imaging Personally Reviewed:  Electrocardiogram Personally Reviewed: Patient is a 51y old  Female who presents with a chief complaint of Elevated Liver Enzymes (19 Sep 2021 08:38)      SUBJECTIVE / OVERNIGHT EVENTS: No overnight events    This morning patient is feeling well. Reports anorexia but has been pushing herself to eat. Also reports continued malaise and intermittent generalized discomfort. Has been having normal bowel movements. Denies SOB, abdominal pain, nausea, vomiting, dysuria, urinary frequency, rash.     MEDICATIONS  (STANDING):  dextrose 40% Gel 15 Gram(s) Oral once  dextrose 5%. 1000 milliLiter(s) (50 mL/Hr) IV Continuous <Continuous>  dextrose 5%. 1000 milliLiter(s) (100 mL/Hr) IV Continuous <Continuous>  dextrose 50% Injectable 25 Gram(s) IV Push once  dextrose 50% Injectable 12.5 Gram(s) IV Push once  dextrose 50% Injectable 25 Gram(s) IV Push once  enoxaparin Injectable 40 milliGRAM(s) SubCutaneous daily  glucagon  Injectable 1 milliGRAM(s) IntraMuscular once  influenza   Vaccine 0.5 milliLiter(s) IntraMuscular once  insulin lispro (ADMELOG) corrective regimen sliding scale   SubCutaneous three times a day before meals  insulin lispro (ADMELOG) corrective regimen sliding scale   SubCutaneous at bedtime    MEDICATIONS  (PRN):      PHYSICAL EXAM:  Vital Signs Last 24 Hrs  T(C): 37.5 (19 Sep 2021 22:38), Max: 37.5 (19 Sep 2021 22:38)  T(F): 99.5 (19 Sep 2021 22:38), Max: 99.5 (19 Sep 2021 22:38)  HR: 94 (19 Sep 2021 22:38) (83 - 94)  BP: 106/64 (19 Sep 2021 22:38) (106/64 - 110/73)  BP(mean): --  RR: 18 (19 Sep 2021 22:38) (16 - 18)  SpO2: 100% (19 Sep 2021 22:38) (100% - 100%)    CONSTITUTIONAL: NAD, well-developed  EYES: +scleral icterus  RESPIRATORY: Normal WOB; lungs are CTA b/l  CARDIOVASCULAR: RRR; S1/S2 present; no m/r/g; No LE edema  ABDOMEN: Soft, normoactive BS, no rebound/guarding, no suprapubic tenderness; +hepatomegaly, +tenderness to percussion RUQ  MUSCULOSKELETAL:  moving all extremities  NEUROLOGY: awake, A&O to person, place, and time, no asterixis   SKIN: No rashes  ----  I&O's Summary    ----  LABS:    ----  09-19    135  |  98  |  5<L>  ----------------------------<  127<H>  4.5   |  27  |  0.71    Ca    8.6      19 Sep 2021 21:18  Phos  3.0     09-19  Mg     2.00     09-19    TPro  6.6  /  Alb  3.4  /  TBili  13.0<H>  /  DBili  x   /  AST  2958<H>  /  ALT  1590<H>  /  AlkPhos  215<H>  09-19    ----    RADIOLOGY & ADDITIONAL TESTS:  Results Reviewed:   Imaging Personally Reviewed:  Electrocardiogram Personally Reviewed:

## 2021-09-20 NOTE — PROGRESS NOTE ADULT - PROBLEM SELECTOR PLAN 1
AST 1851, ALT 1780, AlkPhos 269 on initial presentation. CT and RUQ ultrasound showing evidence of acute hepatitis. Although gallbladder wall thickening and pericholecystic fluid noted on US, patient denies abdominal pain. No ductal dilatation noticed on ultrasound. Suspicion for biliary obstruction low. Findings suggestive of hepatocellular injury from acute viral hepatitis (history of tattoos and unprotected sex). Hepatitis B positive. Liver enzymes and INR uptrending.   - Will discuss w hepatology for further treatment  - Avoid hepatotoxic agents  - Trend Coag, CMP   - Viral/Autoimmune/Metabolic hepatitis workup sent: HAV, HBV, HCV, HEV, HSV, VZV, EBV, CMV, DOUG, anti-smooth muscle, anti-mitochondrial, anti-microsomal, immunoglobulin, iron studies, ferritin, ceruloplasmin

## 2021-09-20 NOTE — PROGRESS NOTE ADULT - ASSESSMENT
50yo female PMHx DM, HTN, NAGI, Fibroid and history of new tattoos 8 months ago presenting with jaundice and malaise. Found to have liver enzymes in 1000s, elevated total and direct bilirubin. Abdominal imaging with US and CT suggestive of acute hepatitis. Hep B IgM +.

## 2021-09-20 NOTE — DISCHARGE NOTE PROVIDER - NSDCCPTREATMENT_GEN_ALL_CORE_FT
PRINCIPAL PROCEDURE  Procedure: US abdomen RUQ  Findings and Treatment: EXAM:  US ABDOMEN LIMITED                        PROCEDURE DATE:  09/16/2021    INTERPRETATION:  CLINICAL INFORMATION: Scleral icterus  COMPARISON: CT abdomen pelvis 11/30/2015  FINDINGS: Grayscale and duplex evaluation of the right upper quadrant was performed.  LIVER: Hepatomegaly with a craniocaudad length of 19 cm. Hepatopedal blood flow in the main portal vein.  BILE DUCTS: No intrahepatic biliary ductal dilatation is noted. The common bile duct measures 2 mm.  GALLBLADDER:Thickened appearance of the gallbladder wall despite underdistention. Wall measures 9 mm. Mild pericholecystic fluid. No stones are seen. Sonographic George's sign is negative.  PANCREAS: Visualized portions are within normal limits.  RIGHT KIDNEY: 11 cm in length. No hydronephrosis or renal calculi.  IMPRESSION: Marked gallbladder wall thickening and pericholecystic fluid. Sonographic George's sign is negative.  Hepatomegaly.  --- End of Report ---        SECONDARY PROCEDURE  Procedure: CT abdomen  Findings and Treatment: EXAM:  CT ABDOMEN AND PELVIS IC    PROCEDURE DATE:  Sep 17 2021   INTERPRETATION:  CLINICAL INFORMATION: Painless jaundice  COMPARISON: CT abdomen and pelvis 11/30/2015  CONTRAST/COMPLICATIONS:  IV Contrast: Omnipaque 350  90 cc administered   10 cc discarded  Oral Contrast: NONE  Complications: None reported at time of study completion  PROCEDURE:  CT of the Abdomen and Pelvis was performed.  Sagittal and coronal reformats were performed.  FINDINGS:  LOWER CHEST: Partially imaged bilateral breast implants. Bibasilar subsegmental atelectasis with associated trace pleural thickening.  LIVER: Enlarged liver. Right hepatic lobe measures 22.0 cm in craniocaudal diameter. Periportal edema.  BILE DUCTS: Mild periportal edema. Common bile duct is within normal limits. No CT evidence for choledocholithiasis.  GALLBLADDER: Gallbladder wall thickening with marked wall edema/pericholecystic fluid. No CT evidence for cholelithiasis.  SPLEEN: Within normal limits.  PANCREAS: Within normal limits.  ADRENALS: Within normal limits.  KIDNEYS/URETERS: Left parapelvic cyst. No hydronephrosis bilaterally. Symmetric enhancement bilaterally.  BLADDER: Minimally distended.  REPRODUCTIVE ORGANS: Enlarged uterus with a large fundal fibroid, measuring approximately 7.7 x 6.8 cm. Additional fibroids are seen as well.  BOWEL: No bowel obstruction. Appendix is normal.  PERITONEUM: Small pelvic free fluid, likely physiological.  VESSELS: Periportal edema.  RETROPERITONEUM/LYMPH NODES:Increased number of small periportal lymph nodes, likely reactive.  ABDOMINAL WALL: Nodular subcutaneous infiltration in the bilateral bilateral gluteal regions, similar in appearance to 11/30/2015.  BONES: Within normal limits.  IMPRESSION:  Enlarged liver with periportal edema and trace perihepatic ascites. Nonspecific thickening of the gallbladder wall. Findings raise a question of acute hepatitis. Correlate with clinical and laboratory findings.  Enlarged fibroid uterus.  --- End ofReport ---

## 2021-09-20 NOTE — PROGRESS NOTE ADULT - PROBLEM SELECTOR PLAN 4
Hgb 10 on presentation with MCV 65. On Ferrous Sulfate 325 BID at home.  - Hold during acute hepatitis Hgb 10 on presentation with MCV 65. On Ferrous Sulfate 325 BID at home.  - Hold during acute hepatitis  - f/u hgb electrophoresis

## 2021-09-20 NOTE — PROGRESS NOTE ADULT - ATTENDING COMMENTS
50 yo F w/ hx DM, HTN p/w elevated liver enzymes and found to be HBV+ acute vs reactivation. LFTs/INR uptrending. chronic microcytic anemia hemoglobin + beta thalessemia minor VZV IgM borderline positive no overt lesions.    abnormal LFT- monitor LIiver enzymes q 12 and PT/INR rising if cont to rise may need transfer to [facility] for liver transplant. f/u serology as per above   f/u hepatology recs

## 2021-09-21 LAB
ALBUMIN SERPL ELPH-MCNC: 3.2 G/DL — LOW (ref 3.3–5)
ALP SERPL-CCNC: 190 U/L — HIGH (ref 40–120)
ALT FLD-CCNC: 1458 U/L — HIGH (ref 4–33)
ANA PAT FLD IF-IMP: ABNORMAL
ANA TITR SER: ABNORMAL
ANION GAP SERPL CALC-SCNC: 11 MMOL/L — SIGNIFICANT CHANGE UP (ref 7–14)
AST SERPL-CCNC: 2926 U/L — HIGH (ref 4–32)
BILIRUB SERPL-MCNC: 14.7 MG/DL — HIGH (ref 0.2–1.2)
BUN SERPL-MCNC: 6 MG/DL — LOW (ref 7–23)
CALCIUM SERPL-MCNC: 8.8 MG/DL — SIGNIFICANT CHANGE UP (ref 8.4–10.5)
CHLORIDE SERPL-SCNC: 100 MMOL/L — SIGNIFICANT CHANGE UP (ref 98–107)
CMV DNA CSF QL NAA+PROBE: SIGNIFICANT CHANGE UP
CO2 SERPL-SCNC: 24 MMOL/L — SIGNIFICANT CHANGE UP (ref 22–31)
CREAT SERPL-MCNC: 0.59 MG/DL — SIGNIFICANT CHANGE UP (ref 0.5–1.3)
EBV DNA SERPL NAA+PROBE-ACNC: SIGNIFICANT CHANGE UP IU/ML
FIBRINOGEN AG PPP IA-MCNC: 392 MG/DL — HIGH (ref 180–350)
GLUCOSE SERPL-MCNC: 80 MG/DL — SIGNIFICANT CHANGE UP (ref 70–99)
HBV DNA # SERPL NAA+PROBE: SIGNIFICANT CHANGE UP IU/ML
HBV DNA SERPL NAA+PROBE-LOG#: 4.23 LOG10IU/ML — SIGNIFICANT CHANGE UP
HCT VFR BLD CALC: 28.1 % — LOW (ref 34.5–45)
HGB BLD-MCNC: 9.6 G/DL — LOW (ref 11.5–15.5)
HIV 1+2 AB+HIV1 P24 AG SERPL QL IA: SIGNIFICANT CHANGE UP
INR BLD: 1.68 RATIO — HIGH (ref 0.88–1.16)
INR BLD: 1.73 RATIO — HIGH (ref 0.88–1.16)
LACTATE SERPL-SCNC: 1.5 MMOL/L — SIGNIFICANT CHANGE UP (ref 0.5–2)
LACTATE SERPL-SCNC: 1.9 MMOL/L — SIGNIFICANT CHANGE UP (ref 0.5–2)
MAGNESIUM SERPL-MCNC: 1.9 MG/DL — SIGNIFICANT CHANGE UP (ref 1.6–2.6)
MAGNESIUM SERPL-MCNC: 2 MG/DL — SIGNIFICANT CHANGE UP (ref 1.6–2.6)
MCHC RBC-ENTMCNC: 22.1 PG — LOW (ref 27–34)
MCHC RBC-ENTMCNC: 34.2 GM/DL — SIGNIFICANT CHANGE UP (ref 32–36)
MCV RBC AUTO: 64.7 FL — LOW (ref 80–100)
MITOCHONDRIA AB SER-ACNC: SIGNIFICANT CHANGE UP
NRBC # BLD: 0 /100 WBCS — SIGNIFICANT CHANGE UP
NRBC # FLD: 0 K/UL — SIGNIFICANT CHANGE UP
PHOSPHATE SERPL-MCNC: 2.9 MG/DL — SIGNIFICANT CHANGE UP (ref 2.5–4.5)
PHOSPHATE SERPL-MCNC: 3.5 MG/DL — SIGNIFICANT CHANGE UP (ref 2.5–4.5)
PLATELET # BLD AUTO: 357 K/UL — SIGNIFICANT CHANGE UP (ref 150–400)
POTASSIUM SERPL-MCNC: 4.6 MMOL/L — SIGNIFICANT CHANGE UP (ref 3.5–5.3)
POTASSIUM SERPL-SCNC: 4.6 MMOL/L — SIGNIFICANT CHANGE UP (ref 3.5–5.3)
PROT SERPL-MCNC: 6.4 G/DL — SIGNIFICANT CHANGE UP (ref 6–8.3)
PROTHROM AB SERPL-ACNC: 18.7 SEC — HIGH (ref 10.6–13.6)
PROTHROM AB SERPL-ACNC: 19.4 SEC — HIGH (ref 10.6–13.6)
RBC # BLD: 4.34 M/UL — SIGNIFICANT CHANGE UP (ref 3.8–5.2)
RBC # FLD: 19.3 % — HIGH (ref 10.3–14.5)
SODIUM SERPL-SCNC: 135 MMOL/L — SIGNIFICANT CHANGE UP (ref 135–145)
WBC # BLD: 6.96 K/UL — SIGNIFICANT CHANGE UP (ref 3.8–10.5)
WBC # FLD AUTO: 6.96 K/UL — SIGNIFICANT CHANGE UP (ref 3.8–10.5)

## 2021-09-21 PROCEDURE — 99232 SBSQ HOSP IP/OBS MODERATE 35: CPT | Mod: GC

## 2021-09-21 PROCEDURE — 99233 SBSQ HOSP IP/OBS HIGH 50: CPT | Mod: GC

## 2021-09-21 RX ORDER — PHYTONADIONE (VIT K1) 5 MG
5 TABLET ORAL DAILY
Refills: 0 | Status: COMPLETED | OUTPATIENT
Start: 2021-09-21 | End: 2021-09-23

## 2021-09-21 RX ADMIN — Medication 5 MILLIGRAM(S): at 12:47

## 2021-09-21 NOTE — PROGRESS NOTE ADULT - SUBJECTIVE AND OBJECTIVE BOX
Gastroenterology progress note:     Patient is a 51y old  Female who presents with a chief complaint of Elevated Liver Enzymes        Admitted on: 09-17-21    We are following the patient for: elevated liver enzyme     Interval History:  No acute event ON, can tolerate PO intake, no diarrhea, no abdominal pain    PAST MEDICAL & SURGICAL HISTORY:  Iron deficiency anemia    HTN (hypertension)    DM (diabetes mellitus)    Fibroid uterus    Aborted ectopic pregnancy    H/O breast augmentation        MEDICATIONS  (STANDING):  dextrose 40% Gel 15 Gram(s) Oral once  dextrose 5%. 1000 milliLiter(s) (50 mL/Hr) IV Continuous <Continuous>  dextrose 5%. 1000 milliLiter(s) (100 mL/Hr) IV Continuous <Continuous>  dextrose 50% Injectable 25 Gram(s) IV Push once  dextrose 50% Injectable 12.5 Gram(s) IV Push once  dextrose 50% Injectable 25 Gram(s) IV Push once  enoxaparin Injectable 40 milliGRAM(s) SubCutaneous daily  glucagon  Injectable 1 milliGRAM(s) IntraMuscular once  influenza   Vaccine 0.5 milliLiter(s) IntraMuscular once  insulin lispro (ADMELOG) corrective regimen sliding scale   SubCutaneous three times a day before meals  insulin lispro (ADMELOG) corrective regimen sliding scale   SubCutaneous at bedtime    MEDICATIONS  (PRN):      Allergies  lisinopril (Swelling)      Review of Systems:   Constitutional: Ne Fever, No chills, No weakness  ENT: No visual changes, No throat pain  Cardiovascular:  No Chest Pain, No Palpitations  Respiratory:  No Cough, No Dyspnea  Gastrointestinal:  As described in HPI  Neurological: No numbness or weakness  Skin: No rash, no itching    Physical Examination:  T(C): 37.1 (09-21-21 @ 06:55), Max: 37.3 (09-20-21 @ 21:37)  HR: 90 (09-21-21 @ 06:55) (80 - 90)  BP: 112/76 (09-21-21 @ 06:55) (109/66 - 132/78)  RR: 18 (09-21-21 @ 06:55) (18 - 18)  SpO2: 98% (09-21-21 @ 06:55) (98% - 100%)      Constitutional: No acute distress.  Respiratory:  No signs of respiratory distress. Lung sounds are clear bilaterally.  Cardiovascular:  S1 S2, Regular rate and rhythm.  Abdominal: Abdomen is soft, symmetric, and non-tender without distention. There are no visible lesions. Bowel sounds are present and normoactive in all four quadrants. No masses, hepatomegaly, or splenomegaly are noted.   Skin: No rashes, No Jaundice.  Neurology: AAOX3, Non-focal  Skin: No rash, No excoriation  Vascular: No varicose vein, No cyanosis, No edema        Data: (reviewed by attending)                        9.8    7.72  )-----------( 344      ( 20 Sep 2021 07:48 )             29.3     Hgb trend:  9.8  09-20-21 @ 07:48      09-20    133<L>  |  99  |  6<L>  ----------------------------<  141<H>  4.7   |  26  |  0.66    Ca    8.7      20 Sep 2021 22:34  Phos  2.8     09-20  Mg     2.10     09-20    TPro  6.4  /  Alb  3.1<L>  /  TBili  13.9<H>  /  DBili  x   /  AST  2877<H>  /  ALT  1473<H>  /  AlkPhos  199<H>  09-20    Liver panel trend:  TBili 13.9   /   AST 2877   /   ALT 1473   /   AlkP 199   /   Tptn 6.4   /   Alb 3.1    /   DBili --      09-20  TBili 14.5   /   AST 3420   /   ALT 1649   /   AlkP 217   /   Tptn 6.8   /   Alb 3.4    /   DBili --      09-20  TBili 13.0   /   AST 2958   /   ALT 1590   /   AlkP 215   /   Tptn 6.6   /   Alb 3.4    /   DBili --      09-19  TBili 11.4   /   AST 2359   /   ALT 1385   /   AlkP 202   /   Tptn 6.0   /   Alb 3.2    /   DBili --      09-19  TBili 11.5   /   AST 2230   /   ALT 1496   /   AlkP 225   /   Tptn 6.7   /   Alb 3.5    /   DBili --      09-18  TBili 9.9   /   AST 1944   /   ALT 1386   /   AlkP 220   /   Tptn 6.2   /   Alb 3.2    /   DBili --      09-18  TBili 8.9   /   AST 1539   /   ALT 1253   /   AlkP 221   /   Tptn 5.9   /   Alb 3.1    /   DBili --      09-17  TBili 8.3   /   AST 1366   /   ALT 1261   /   AlkP 223   /   Tptn 5.7   /   Alb 3.0    /   DBili --      09-17  TBili 8.5   /   AST --   /   ALT --   /   AlkP --   /   Tptn --   /   Alb --    /   DBili 7.10      09-16  TBili 9.7   /   AST 1851   /   ALT 1780   /   AlkP 269   /   Tptn 7.1   /   Alb 2.9    /   DBili --      09-16      PT/INR - ( 20 Sep 2021 22:34 )   PT: 19.8 sec;   INR: 1.79 ratio                  Hepatology progress note:       Patient is a 51y old  Female who presents with a chief complaint of Elevated Liver Enzymes        Admitted on: 09-17-21    We are following the patient for: elevated liver enzyme     Interval History:  No acute event ON, can tolerate PO intake, no diarrhea, no abdominal pain    PAST MEDICAL & SURGICAL HISTORY:  Iron deficiency anemia    HTN (hypertension)    DM (diabetes mellitus)    Fibroid uterus    Aborted ectopic pregnancy    H/O breast augmentation        MEDICATIONS  (STANDING):  dextrose 40% Gel 15 Gram(s) Oral once  dextrose 5%. 1000 milliLiter(s) (50 mL/Hr) IV Continuous <Continuous>  dextrose 5%. 1000 milliLiter(s) (100 mL/Hr) IV Continuous <Continuous>  dextrose 50% Injectable 25 Gram(s) IV Push once  dextrose 50% Injectable 12.5 Gram(s) IV Push once  dextrose 50% Injectable 25 Gram(s) IV Push once  enoxaparin Injectable 40 milliGRAM(s) SubCutaneous daily  glucagon  Injectable 1 milliGRAM(s) IntraMuscular once  influenza   Vaccine 0.5 milliLiter(s) IntraMuscular once  insulin lispro (ADMELOG) corrective regimen sliding scale   SubCutaneous three times a day before meals  insulin lispro (ADMELOG) corrective regimen sliding scale   SubCutaneous at bedtime    MEDICATIONS  (PRN):      Allergies  lisinopril (Swelling)      Review of Systems:   Constitutional: Ne Fever, No chills, No weakness  ENT: No visual changes, No throat pain  Cardiovascular:  No Chest Pain, No Palpitations  Respiratory:  No Cough, No Dyspnea  Gastrointestinal:  As described in HPI  Neurological: No numbness or weakness  Skin: No rash, no itching    Physical Examination:  T(C): 37.1 (09-21-21 @ 06:55), Max: 37.3 (09-20-21 @ 21:37)  HR: 90 (09-21-21 @ 06:55) (80 - 90)  BP: 112/76 (09-21-21 @ 06:55) (109/66 - 132/78)  RR: 18 (09-21-21 @ 06:55) (18 - 18)  SpO2: 98% (09-21-21 @ 06:55) (98% - 100%)      Constitutional: No acute distress.  Respiratory:  No signs of respiratory distress. Lung sounds are clear bilaterally.  Cardiovascular:  S1 S2, Regular rate and rhythm.  Abdominal: Abdomen is soft, symmetric, and non-tender without distention. There are no visible lesions. Bowel sounds are present and normoactive in all four quadrants. No masses, hepatomegaly, or splenomegaly are noted.   Skin: No rashes, No Jaundice.  Neurology: AAOX3, Non-focal  Skin: No rash, No excoriation  Vascular: No varicose vein, No cyanosis, No edema        Data: (reviewed by attending)                        9.8    7.72  )-----------( 344      ( 20 Sep 2021 07:48 )             29.3     Hgb trend:  9.8  09-20-21 @ 07:48      09-20    133<L>  |  99  |  6<L>  ----------------------------<  141<H>  4.7   |  26  |  0.66    Ca    8.7      20 Sep 2021 22:34  Phos  2.8     09-20  Mg     2.10     09-20    TPro  6.4  /  Alb  3.1<L>  /  TBili  13.9<H>  /  DBili  x   /  AST  2877<H>  /  ALT  1473<H>  /  AlkPhos  199<H>  09-20    Liver panel trend:  TBili 13.9   /   AST 2877   /   ALT 1473   /   AlkP 199   /   Tptn 6.4   /   Alb 3.1    /   DBili --      09-20  TBili 14.5   /   AST 3420   /   ALT 1649   /   AlkP 217   /   Tptn 6.8   /   Alb 3.4    /   DBili --      09-20  TBili 13.0   /   AST 2958   /   ALT 1590   /   AlkP 215   /   Tptn 6.6   /   Alb 3.4    /   DBili --      09-19  TBili 11.4   /   AST 2359   /   ALT 1385   /   AlkP 202   /   Tptn 6.0   /   Alb 3.2    /   DBili --      09-19  TBili 11.5   /   AST 2230   /   ALT 1496   /   AlkP 225   /   Tptn 6.7   /   Alb 3.5    /   DBili --      09-18  TBili 9.9   /   AST 1944   /   ALT 1386   /   AlkP 220   /   Tptn 6.2   /   Alb 3.2    /   DBili --      09-18  TBili 8.9   /   AST 1539   /   ALT 1253   /   AlkP 221   /   Tptn 5.9   /   Alb 3.1    /   DBili --      09-17  TBili 8.3   /   AST 1366   /   ALT 1261   /   AlkP 223   /   Tptn 5.7   /   Alb 3.0    /   DBili --      09-17  TBili 8.5   /   AST --   /   ALT --   /   AlkP --   /   Tptn --   /   Alb --    /   DBili 7.10      09-16  TBili 9.7   /   AST 1851   /   ALT 1780   /   AlkP 269   /   Tptn 7.1   /   Alb 2.9    /   DBili --      09-16      PT/INR - ( 20 Sep 2021 22:34 )   PT: 19.8 sec;   INR: 1.79 ratio

## 2021-09-21 NOTE — PROGRESS NOTE ADULT - PROBLEM SELECTOR PLAN 1
AST 1851, ALT 1780, AlkPhos 269 on initial presentation. CT and RUQ ultrasound showing evidence of acute hepatitis. Although gallbladder wall thickening and pericholecystic fluid noted on US, patient denies abdominal pain. No ductal dilatation noticed on ultrasound. Suspicion for biliary obstruction low. Findings suggestive of hepatocellular injury from acute viral hepatitis (history of tattoos and unprotected sex). Hepatitis B positive. Liver enzymes and INR uptrending.   - Will discuss w hepatology for further treatment  - Avoid hepatotoxic agents  - Trend Coag, CMP q12   - Viral/Autoimmune/Metabolic hepatitis workup sent: HAV, HBV, HCV, HEV, HSV, VZV, EBV, CMV, DOUG, anti-smooth muscle, anti-mitochondrial, anti-microsomal, immunoglobulin, iron studies, ferritin, ceruloplasmin AST 1851, ALT 1780, AlkPhos 269 on initial presentation. CT and RUQ ultrasound showing evidence of acute hepatitis. Although gallbladder wall thickening and pericholecystic fluid noted on US, patient denies abdominal pain. No ductal dilatation noticed on ultrasound. Suspicion for biliary obstruction low. Findings suggestive of hepatocellular injury from acute viral hepatitis (history of tattoos and unprotected sex). Hepatitis B cIgM positive. VZV IgM positive. Liver enzymes and INR uptrending.   - Will discuss w hepatology for further treatment  - Avoid hepatotoxic agents  - Trend Coag, CMP q12   - Vitamin K PO 5mg 3d   - Viral/Autoimmune/Metabolic hepatitis workup sent; HBV IgM+, VZV IgM+   = HAV, HCV, HEV negative   = EBV, CMV no acute infection; HSV1/2 IgG+   = Ceruloplasmin wnl; ferritin elevated likely iso of acute liver injury   = IgG, IgA wnl; IgM elevated  = Liver Kidney negative   = pending DOUG, anti-smooth muscle, anti-mitochondrial, HDV, HEV, VZV PCR, HBV PCR

## 2021-09-21 NOTE — PROGRESS NOTE ADULT - ATTENDING COMMENTS
Patient seen and examined with the liver team. I agree with the plan as above.  Liver tests stabilized but remain elevated. She is awake and alert and tolerating meals.   Most likely acute or reactivation HBV. awaiting HBVDNA. The VZV IgM is positive which may be a false positive as she has elevated HBcIgM and no skin lesions. If HBV DNA is negative, I would consider treating with acyclovir. If possible, I would contact pathology to determine if varicella PCR can be sent from the serum

## 2021-09-21 NOTE — PROGRESS NOTE ADULT - ASSESSMENT
52 yo F w/ PMHx HTN, DM presenting w/ painless jaundice x 3d, found to have severe hepatocellular liver injury, transferred to [facility] for further management    # HBe-Ag negative Immune reactivation vs Acute hep B  - patient w/ hepatocellular liver injury (R factor 19.9)  - work up revealing:  Hep B surface antigen positive,  Hep B IgM core +, Hep Be antibody +,  Hep Be Ag neg, concerning for acute Hepatitis B infection. Risk factor includes recent tattoo 3 months ago.  - VZV IgM positive  - No hep A/C, HCV RNA neg  - No acute EBV/CMV  - Ceruloplasmin, Anti LKM WNL  - Iron sat 11%  - IgG, IgA WNL, IgM elevated  - Had normal AST/ALT/ALP about one year ago  - 95 % of these is cleared by patient, with 5% having chronic infection.  - AST/ALT/Tbili trending down  - INR trending up slightly  - No encephalopathy      # microcytic anemia - MCV 65, no h/o GIB.    Recommendations:  - pending Hep B quant DNA, AMA, Hep E/D  - Send VZV PCR  - Vit K PO 5 mg for 3 days  - f/u CMV , EBV and HSV IgM  -Monitor INR, liver enzymes, and Bili  -Avoid Hepatotoxic agents  -Rec to test for Hep B active infection/immunization on her partner and vaccinate if needed.  50 yo F w/ PMHx HTN, DM presenting w/ painless jaundice x 3d, found to have severe hepatocellular liver injury, transferred to [facility] for further management    # HBe-Ag negative Immune reactivation vs Acute hep B vs acute VZV hepatitis  - patient w/ hepatocellular liver injury (R factor 19.9)  - work up revealing:  Hep B surface antigen positive,  Hep B IgM core +, Hep Be antibody +,  Hep Be Ag neg, concerning for acute Hepatitis B infection. Risk factor includes recent tattoo 3 months ago.  - VZV IgM positive  - No hep A/C, HCV RNA neg  - No acute EBV/CMV  - Ceruloplasmin, Anti LKM WNL  - Iron sat 11%  - IgG, IgA WNL, IgM elevated  - Had normal AST/ALT/ALP about one year ago  - 95 % of these is cleared by patient, with 5% having chronic infection.  - AST/ALT/Tbili trending down  - INR trending down today  - No encephalopathy      # microcytic anemia - MCV 65, no h/o GIB.    Recommendations:  - pending Hep B quant DNA, AMA, Hep E/D  - Send VZV PCR  - f/u CMV , EBV and HSV IgM  -Monitor INR, liver enzymes, and Bili  -Avoid Hepatotoxic agents  -Rec to test for Hep B active infection/immunization on her partner and vaccinate if needed.

## 2021-09-21 NOTE — PROGRESS NOTE ADULT - PROBLEM SELECTOR PLAN 4
Hgb 10 on presentation with MCV 65. On Ferrous Sulfate 325 BID at home.  - Hold during acute hepatitis  - Hgb electrophoresis with elevated A2 suggestive of beta thalassemia minor

## 2021-09-21 NOTE — PROGRESS NOTE ADULT - ASSESSMENT
52yo female PMHx DM, HTN, NAGI, Fibroid and history of new tattoos 8 months ago presenting with jaundice and malaise. Found to have liver enzymes in 1000s, elevated total and direct bilirubin. Abdominal imaging with US and CT suggestive of acute hepatitis. Hep B IgM +. Liver enzymes and INR uptrending.

## 2021-09-21 NOTE — PROGRESS NOTE ADULT - SUBJECTIVE AND OBJECTIVE BOX
Patient is a 51y old  Female who presents with a chief complaint of Elevated Liver Enzymes (20 Sep 2021 09:39)    SUBJECTIVE / OVERNIGHT EVENTS: No overnight events    This morning patient feels well. Still has anorexia but is able to push herself to eat. Denies chest pain, nausea, vomiting, abdominal pain, diarrhea dysuria, LE swelling.     MEDICATIONS  (STANDING):  dextrose 40% Gel 15 Gram(s) Oral once  dextrose 5%. 1000 milliLiter(s) (50 mL/Hr) IV Continuous <Continuous>  dextrose 5%. 1000 milliLiter(s) (100 mL/Hr) IV Continuous <Continuous>  dextrose 50% Injectable 25 Gram(s) IV Push once  dextrose 50% Injectable 12.5 Gram(s) IV Push once  dextrose 50% Injectable 25 Gram(s) IV Push once  enoxaparin Injectable 40 milliGRAM(s) SubCutaneous daily  glucagon  Injectable 1 milliGRAM(s) IntraMuscular once  influenza   Vaccine 0.5 milliLiter(s) IntraMuscular once  insulin lispro (ADMELOG) corrective regimen sliding scale   SubCutaneous three times a day before meals  insulin lispro (ADMELOG) corrective regimen sliding scale   SubCutaneous at bedtime    MEDICATIONS  (PRN):      PHYSICAL EXAM:  Vital Signs Last 24 Hrs  T(C): 37.1 (21 Sep 2021 06:55), Max: 37.3 (20 Sep 2021 21:37)  T(F): 98.7 (21 Sep 2021 06:55), Max: 99.1 (20 Sep 2021 21:37)  HR: 90 (21 Sep 2021 06:55) (80 - 90)  BP: 112/76 (21 Sep 2021 06:55) (109/66 - 132/78)  BP(mean): --  RR: 18 (21 Sep 2021 06:55) (18 - 18)  SpO2: 98% (21 Sep 2021 06:55) (98% - 100%)    CONSTITUTIONAL: NAD, well-developed  EYES: +scleral icterus  ENMT: Moist oral mucosa  RESPIRATORY: Normal WOB; lungs are CTA b/l  CARDIOVASCULAR: RRR; S1/S2 present; no m/r/g; No LE edema  ABDOMEN: Soft, nontender, normoactive BS, no rebound/guarding; +hepatomegaly   MUSCULOSKELETAL:  moving all extremities  NEUROLOGY: awake, A&O to person, place, and time, no asterixis   SKIN: +Jaundice   ----  I&O's Summary    ----  LABS:                        9.8    7.72  )-----------( 344      ( 20 Sep 2021 07:48 )             29.3     ----  09-20    133<L>  |  99  |  6<L>  ----------------------------<  141<H>  4.7   |  26  |  0.66    Ca    8.7      20 Sep 2021 22:34  Phos  2.8     09-20  Mg     2.10     09-20    TPro  6.4  /  Alb  3.1<L>  /  TBili  13.9<H>  /  DBili  x   /  AST  2877<H>  /  ALT  1473<H>  /  AlkPhos  199<H>  09-20    ----    RADIOLOGY & ADDITIONAL TESTS:  Results Reviewed:   Imaging Personally Reviewed:  Electrocardiogram Personally Reviewed:

## 2021-09-21 NOTE — PROGRESS NOTE ADULT - ATTENDING COMMENTS
52 yo F w/ hx DM, HTN p/w elevated liver enzymes and found to be HBV+ acute vs reactivation. LFTs/INR stable. chronic microcytic anemia hemoglobin + beta thalessemia minor VZV IgM borderline positive no overt lesions. CMV prior infection/EBV past infection/HSV 1/2 past infection hep C RNA neg; AMA neg     abnormal LFT- monitor LIiver enzymes q 12 and PT/INR rising if cont to rise may need transfer to [facility] for liver transplant. f/u serology as per above   VZV IgM is positive which may be a false positive as she has elevated HBcIgM and no skin lesions. If HBV DNA is negative, I would consider treating with acyclovir.  VZV PCR   vitamin K x 3 days monitor INR   hepatology appreciated

## 2021-09-22 LAB
ALBUMIN SERPL ELPH-MCNC: 3.3 G/DL — SIGNIFICANT CHANGE UP (ref 3.3–5)
ALBUMIN SERPL ELPH-MCNC: 3.4 G/DL — SIGNIFICANT CHANGE UP (ref 3.3–5)
ALP SERPL-CCNC: 207 U/L — HIGH (ref 40–120)
ALP SERPL-CCNC: 215 U/L — HIGH (ref 40–120)
ALT FLD-CCNC: 1240 U/L — HIGH (ref 4–33)
ALT FLD-CCNC: 1272 U/L — HIGH (ref 4–33)
ANION GAP SERPL CALC-SCNC: 11 MMOL/L — SIGNIFICANT CHANGE UP (ref 7–14)
ANION GAP SERPL CALC-SCNC: 11 MMOL/L — SIGNIFICANT CHANGE UP (ref 7–14)
AST SERPL-CCNC: 1909 U/L — HIGH (ref 4–32)
AST SERPL-CCNC: 2187 U/L — HIGH (ref 4–32)
BILIRUB SERPL-MCNC: 15.9 MG/DL — HIGH (ref 0.2–1.2)
BILIRUB SERPL-MCNC: 17 MG/DL — HIGH (ref 0.2–1.2)
BUN SERPL-MCNC: 6 MG/DL — LOW (ref 7–23)
BUN SERPL-MCNC: 7 MG/DL — SIGNIFICANT CHANGE UP (ref 7–23)
CALCIUM SERPL-MCNC: 8.7 MG/DL — SIGNIFICANT CHANGE UP (ref 8.4–10.5)
CALCIUM SERPL-MCNC: 8.8 MG/DL — SIGNIFICANT CHANGE UP (ref 8.4–10.5)
CHLORIDE SERPL-SCNC: 100 MMOL/L — SIGNIFICANT CHANGE UP (ref 98–107)
CHLORIDE SERPL-SCNC: 100 MMOL/L — SIGNIFICANT CHANGE UP (ref 98–107)
CO2 SERPL-SCNC: 23 MMOL/L — SIGNIFICANT CHANGE UP (ref 22–31)
CO2 SERPL-SCNC: 23 MMOL/L — SIGNIFICANT CHANGE UP (ref 22–31)
CREAT SERPL-MCNC: 0.57 MG/DL — SIGNIFICANT CHANGE UP (ref 0.5–1.3)
CREAT SERPL-MCNC: 0.59 MG/DL — SIGNIFICANT CHANGE UP (ref 0.5–1.3)
FIBRINOGEN PPP-MCNC: 362 MG/DL — SIGNIFICANT CHANGE UP (ref 290–520)
GLUCOSE SERPL-MCNC: 119 MG/DL — HIGH (ref 70–99)
GLUCOSE SERPL-MCNC: 135 MG/DL — HIGH (ref 70–99)
HCT VFR BLD CALC: 29.4 % — LOW (ref 34.5–45)
HGB BLD-MCNC: 9.6 G/DL — LOW (ref 11.5–15.5)
INR BLD: 1.42 RATIO — HIGH (ref 0.88–1.16)
INR BLD: 1.52 RATIO — HIGH (ref 0.88–1.16)
LACTATE SERPL-SCNC: 1.2 MMOL/L — SIGNIFICANT CHANGE UP (ref 0.5–2)
LACTATE SERPL-SCNC: 1.4 MMOL/L — SIGNIFICANT CHANGE UP (ref 0.5–2)
MAGNESIUM SERPL-MCNC: 2.2 MG/DL — SIGNIFICANT CHANGE UP (ref 1.6–2.6)
MAGNESIUM SERPL-MCNC: 2.2 MG/DL — SIGNIFICANT CHANGE UP (ref 1.6–2.6)
MCHC RBC-ENTMCNC: 21.1 PG — LOW (ref 27–34)
MCHC RBC-ENTMCNC: 32.7 GM/DL — SIGNIFICANT CHANGE UP (ref 32–36)
MCV RBC AUTO: 64.6 FL — LOW (ref 80–100)
NRBC # BLD: 0 /100 WBCS — SIGNIFICANT CHANGE UP
NRBC # FLD: 0 K/UL — SIGNIFICANT CHANGE UP
PHOSPHATE SERPL-MCNC: 2.5 MG/DL — SIGNIFICANT CHANGE UP (ref 2.5–4.5)
PHOSPHATE SERPL-MCNC: 2.7 MG/DL — SIGNIFICANT CHANGE UP (ref 2.5–4.5)
PLATELET # BLD AUTO: 426 K/UL — HIGH (ref 150–400)
POTASSIUM SERPL-MCNC: 4.3 MMOL/L — SIGNIFICANT CHANGE UP (ref 3.5–5.3)
POTASSIUM SERPL-MCNC: 4.4 MMOL/L — SIGNIFICANT CHANGE UP (ref 3.5–5.3)
POTASSIUM SERPL-SCNC: 4.3 MMOL/L — SIGNIFICANT CHANGE UP (ref 3.5–5.3)
POTASSIUM SERPL-SCNC: 4.4 MMOL/L — SIGNIFICANT CHANGE UP (ref 3.5–5.3)
PROT SERPL-MCNC: 6.6 G/DL — SIGNIFICANT CHANGE UP (ref 6–8.3)
PROT SERPL-MCNC: 7.1 G/DL — SIGNIFICANT CHANGE UP (ref 6–8.3)
PROTHROM AB SERPL-ACNC: 15.9 SEC — HIGH (ref 10.6–13.6)
PROTHROM AB SERPL-ACNC: 17 SEC — HIGH (ref 10.6–13.6)
RBC # BLD: 4.55 M/UL — SIGNIFICANT CHANGE UP (ref 3.8–5.2)
RBC # FLD: 19.3 % — HIGH (ref 10.3–14.5)
SODIUM SERPL-SCNC: 134 MMOL/L — LOW (ref 135–145)
SODIUM SERPL-SCNC: 134 MMOL/L — LOW (ref 135–145)
WBC # BLD: 7.24 K/UL — SIGNIFICANT CHANGE UP (ref 3.8–10.5)
WBC # FLD AUTO: 7.24 K/UL — SIGNIFICANT CHANGE UP (ref 3.8–10.5)

## 2021-09-22 PROCEDURE — 99233 SBSQ HOSP IP/OBS HIGH 50: CPT | Mod: GC

## 2021-09-22 PROCEDURE — 99232 SBSQ HOSP IP/OBS MODERATE 35: CPT | Mod: GC

## 2021-09-22 RX ORDER — ACYCLOVIR SODIUM 500 MG
800 VIAL (EA) INTRAVENOUS
Refills: 0 | Status: DISCONTINUED | OUTPATIENT
Start: 2021-09-22 | End: 2021-09-24

## 2021-09-22 RX ORDER — TENOFOVIR DISOPROXIL FUMARATE 300 MG/1
25 TABLET, FILM COATED ORAL DAILY
Refills: 0 | Status: DISCONTINUED | OUTPATIENT
Start: 2021-09-22 | End: 2021-09-24

## 2021-09-22 RX ADMIN — TENOFOVIR DISOPROXIL FUMARATE 25 MILLIGRAM(S): 300 TABLET, FILM COATED ORAL at 18:30

## 2021-09-22 RX ADMIN — Medication 800 MILLIGRAM(S): at 18:30

## 2021-09-22 RX ADMIN — Medication 5 MILLIGRAM(S): at 15:56

## 2021-09-22 RX ADMIN — Medication 800 MILLIGRAM(S): at 22:04

## 2021-09-22 NOTE — PROGRESS NOTE ADULT - ATTENDING COMMENTS
Patient seen and examined with the liver team. I agree with plan as above.  Will start tenofovir and acyclovir today.  I would observe and follow labs. if continued improvement, I would plan on tentative discharge Friday

## 2021-09-22 NOTE — PROGRESS NOTE ADULT - ASSESSMENT
50 yo F w/ PMHx HTN, DM presenting w/ painless jaundice x 3d, found to have severe hepatocellular liver injury, transferred to [facility] for further management    # HBe-Ag negative Immune reactivation +/- acute VZV hepatitis  - patient w/ hepatocellular liver injury (R factor 19.9)  - work up revealing:  Hep B surface antigen positive,  Hep B IgM core +, Hep Be antibody +,  Hep Be Ag neg, concerning for acute Hepatitis B infection. Risk factor includes recent tattoo 3 months ago.  - VZV IgM positive--> no h/o skin rash --> ? false positive  - No hep A/C, HCV RNA neg  - No acute EBV/CMV--> IgM neg and PCR neg  - Ceruloplasmin, Anti LKM WNL  - Iron sat 11%  - IgG, IgA WNL, IgM elevated  - Had normal AST/ALT/ALP about one year ago  - 95 % of these is cleared by patient, with 5% having chronic infection.  - ALT trending down, AST same range as yesterday  - Tbili increased since yesterday  - INR trending down   - No encephalopathy  - HBV DNA--> [id number]        # microcytic anemia - MCV 65, no h/o GIB.    Recommendations:  - f/u Hep E/D  - f/u VZV PCR  - f/u HSV IgM  - patient will benefit from antiviral therapy for Hep B ( TAF preferably )--> will discuss with hep attending  -Monitor INR, liver enzymes, and Bili  -Avoid Hepatotoxic agents  -Rec to test for Hep B active infection/immunization on her partner and vaccinate if needed.  52 yo F w/ PMHx HTN, DM presenting w/ painless jaundice x 3d, found to have severe hepatocellular liver injury, transferred to [facility] for further management    # HBe-Ag negative Immune reactivation +/- acute VZV hepatitis  - patient w/ hepatocellular liver injury (R factor 19.9)  - work up revealing:  Hep B surface antigen positive,  Hep B IgM core +, Hep Be antibody +,  Hep Be Ag neg, concerning for acute Hepatitis B infection. Risk factor includes recent tattoo 3 months ago.  - VZV IgM positive--> no h/o skin rash --> ? false positive  - No hep A/C, HCV RNA neg  - No acute EBV/CMV--> IgM neg and PCR neg  - Ceruloplasmin, Anti LKM WNL  - Iron sat 11%  - IgG, IgA WNL, IgM elevated  - Had normal AST/ALT/ALP about one year ago  - 95 % of these is cleared by patient, with 5% having chronic infection.  - ALT trending down, AST same range as yesterday  - Tbili increased since yesterday  - INR trending down   - No encephalopathy  - HBV DNA--> [id number]        # microcytic anemia - MCV 65, no h/o GIB.    Recommendations:  - f/u Hep E/D  - f/u VZV PCR  - f/u HSV IgM  -Recommend to satrt antiviral therapy for Hep B ( TAF preferably ) + Acyclovir for possible VZV today  -Monitor INR, liver enzymes, and Bili  -Avoid Hepatotoxic agents  -Rec to test for Hep B active infection/immunization on her partner and vaccinate if needed.

## 2021-09-22 NOTE — PROGRESS NOTE ADULT - ASSESSMENT
50yo female PMHx DM, HTN, NAGI, Fibroid and history of new tattoos 8 months ago presenting with jaundice and malaise. Found to have liver enzymes in 1000s, elevated total and direct bilirubin. Abdominal imaging with US and CT suggestive of acute hepatitis. Hep B IgM +. Liver enzymes and INR uptrending.

## 2021-09-22 NOTE — PROGRESS NOTE ADULT - PROBLEM SELECTOR PLAN 1
AST 1851, ALT 1780, AlkPhos 269 on initial presentation. CT and RUQ ultrasound showing evidence of acute hepatitis. Although gallbladder wall thickening and pericholecystic fluid noted on US, patient denies abdominal pain. No ductal dilatation noticed on ultrasound. Suspicion for biliary obstruction low. Findings suggestive of hepatocellular injury from acute viral hepatitis (history of tattoos and unprotected sex). Hepatitis B cIgM positive. VZV IgM positive. Liver enzymes and INR uptrending.   - Will discuss w hepatology for further treatment  - Avoid hepatotoxic agents  - Trend Coag, CMP q12   - Vitamin K PO 5mg 3d   - Viral/Autoimmune/Metabolic hepatitis workup sent; HBV IgM+, VZV IgM+   = HAV, HCV, HEV negative   = EBV, CMV no acute infection; HSV1/2 IgG+   = Ceruloplasmin wnl; ferritin elevated likely iso of acute liver injury   = IgG, IgA wnl; IgM elevated  = Liver Kidney negative   = pending DOUG, anti-smooth muscle, anti-mitochondrial, HDV, HEV, VZV PCR, HBV PCR AST 1851, ALT 1780, AlkPhos 269 on initial presentation. CT and RUQ ultrasound showing evidence of acute hepatitis. Although gallbladder wall thickening and pericholecystic fluid noted on US, patient denies abdominal pain. No ductal dilatation noticed on ultrasound. Suspicion for biliary obstruction low. Findings suggestive of hepatocellular injury from acute viral hepatitis (history of tattoos and unprotected sex). Hepatitis B cIgM positive, PCR+. VZV IgM positive. Liver enzymes and INR uptrending.   - HBV PCR [id number]  - Will discuss w hepatology for further treatment  - Avoid hepatotoxic agents  - Trend Coag, CMP q12   - Vitamin K PO 5mg 3d (day 2/3)  - Viral/Autoimmune/Metabolic hepatitis workup sent; HBV IgM+, VZV IgM+   = HAV, HCV, HEV negative   = EBV, CMV no acute infection; HSV1/2 IgG+   = Ceruloplasmin wnl; ferritin elevated likely iso of acute liver injury   = IgG, IgA wnl; IgM elevated  = Liver Kidney negative; DOUG 1:320    = pending anti-smooth muscle, anti-mitochondrial, HDV, HEV, VZV PCR

## 2021-09-22 NOTE — PROGRESS NOTE ADULT - ATTENDING COMMENTS
50 yo F w/ hx DM, HTN p/w elevated liver enzymes and found to be HBV+ acute vs reactivation. LFTs/INR stable. chronic microcytic anemia hemoglobin + beta thalessemia minor VZV IgM borderline positive no overt lesions. CMV prior infection/EBV past infection/HSV 1/2 past infection hep C RNA neg; AMA neg; anti LKM neg     acute vs reactivation Hep B- monitor LFT and INR  Tenofivir and acylovir as per hepatology  VZV PCR, HSV IgM , hep E and Hep D in lab    DOUG positive 1:320 unclear significance will need to discuss with hepatology if can be false pos no overt joint pain or rash  vitamin K x 3 days   hepatology appreciated 50 yo F w/ hx DM, HTN p/w elevated liver enzymes and found to be HBV+ acute vs reactivation. LFTs/INR stable. chronic microcytic anemia hemoglobin + beta thalessemia minor VZV IgM borderline positive no overt lesions. CMV prior infection/EBV past infection/HSV 1/2 past infection hep C RNA neg; AMA neg; anti LKM neg EBV and CMV PCR neg     acute vs reactivation Hep B- monitor LFT and INR  Tenofivir and acylovir as per hepatology  VZV PCR, HSV IgM , hep E and Hep D in lab    DOUG positive 1:320 unclear significance will need to discuss with hepatology if can be false pos no overt joint pain or rash  vitamin K x 3 days   hepatology appreciated

## 2021-09-22 NOTE — PROGRESS NOTE ADULT - SUBJECTIVE AND OBJECTIVE BOX
Patient is a 51y old  Female who presents with a chief complaint of Elevated Liver Enzymes (21 Sep 2021 08:21)      SUBJECTIVE / OVERNIGHT EVENTS: No overnight events.     Patient is feeling well. Has anorexia but pushing herself to eat. Reports some pruritis of extremities that went away with topical cream     MEDICATIONS  (STANDING):  dextrose 40% Gel 15 Gram(s) Oral once  dextrose 5%. 1000 milliLiter(s) (50 mL/Hr) IV Continuous <Continuous>  dextrose 5%. 1000 milliLiter(s) (100 mL/Hr) IV Continuous <Continuous>  dextrose 50% Injectable 25 Gram(s) IV Push once  dextrose 50% Injectable 12.5 Gram(s) IV Push once  dextrose 50% Injectable 25 Gram(s) IV Push once  enoxaparin Injectable 40 milliGRAM(s) SubCutaneous daily  glucagon  Injectable 1 milliGRAM(s) IntraMuscular once  influenza   Vaccine 0.5 milliLiter(s) IntraMuscular once  insulin lispro (ADMELOG) corrective regimen sliding scale   SubCutaneous three times a day before meals  insulin lispro (ADMELOG) corrective regimen sliding scale   SubCutaneous at bedtime  phytonadione   Solution 5 milliGRAM(s) Oral daily    MEDICATIONS  (PRN):      PHYSICAL EXAM:  Vital Signs Last 24 Hrs  T(C): 36.5 (22 Sep 2021 06:36), Max: 36.9 (21 Sep 2021 13:05)  T(F): 97.7 (22 Sep 2021 06:36), Max: 98.4 (21 Sep 2021 13:05)  HR: 72 (22 Sep 2021 06:36) (72 - 86)  BP: 106/75 (22 Sep 2021 06:36) (106/75 - 121/79)  BP(mean): --  RR: 18 (22 Sep 2021 06:36) (18 - 18)  SpO2: 97% (22 Sep 2021 06:36) (97% - 100%)    CONSTITUTIONAL: NAD, well-developed  EYES: +scleral icterus   ENMT: Moist oral mucosa  RESPIRATORY: Normal WOB; lungs are CTA b/l  CARDIOVASCULAR: RRR; S1/S2 present; no m/r/g; No LE edema  ABDOMEN: Soft, nontender, normoactive BS, no rebound/guarding; +hepatomegaly   MUSCULOSKELETAL:  moving all extremities  NEUROLOGY: awake, A&O to person, place, and time  SKIN: No rashes  ----  I&O's Summary    ----  LABS:                        9.6    7.24  )-----------( 426      ( 22 Sep 2021 07:56 )             29.4     ----  09-22    134<L>  |  100  |  7   ----------------------------<  119<H>  4.4   |  23  |  0.59    Ca    8.7      22 Sep 2021 07:56  Phos  2.7     09-22  Mg     2.20     09-22    TPro  6.6  /  Alb  3.3  /  TBili  15.9<H>  /  DBili  x   /  AST  x   /  ALT  x   /  AlkPhos  207<H>  09-22    ----    RADIOLOGY & ADDITIONAL TESTS:  Results Reviewed:   Imaging Personally Reviewed:  Electrocardiogram Personally Reviewed:

## 2021-09-22 NOTE — PROGRESS NOTE ADULT - SUBJECTIVE AND OBJECTIVE BOX
Gastroenterology progress note:     Patient is a 51y old  Female who presents with a chief complaint of Elevated Liver Enzymes (22 Sep 2021 08:34)       Admitted on: 09-17-21    We are following the patient for: elevated liver tests     Interval History:  No acute event ON, can tolerate PO intake, no fever      PAST MEDICAL & SURGICAL HISTORY:  Iron deficiency anemia    HTN (hypertension)    DM (diabetes mellitus)    Fibroid uterus    Aborted ectopic pregnancy    H/O breast augmentation        MEDICATIONS  (STANDING):  dextrose 40% Gel 15 Gram(s) Oral once  dextrose 5%. 1000 milliLiter(s) (50 mL/Hr) IV Continuous <Continuous>  dextrose 5%. 1000 milliLiter(s) (100 mL/Hr) IV Continuous <Continuous>  dextrose 50% Injectable 25 Gram(s) IV Push once  dextrose 50% Injectable 12.5 Gram(s) IV Push once  dextrose 50% Injectable 25 Gram(s) IV Push once  enoxaparin Injectable 40 milliGRAM(s) SubCutaneous daily  glucagon  Injectable 1 milliGRAM(s) IntraMuscular once  influenza   Vaccine 0.5 milliLiter(s) IntraMuscular once  insulin lispro (ADMELOG) corrective regimen sliding scale   SubCutaneous three times a day before meals  insulin lispro (ADMELOG) corrective regimen sliding scale   SubCutaneous at bedtime  phytonadione   Solution 5 milliGRAM(s) Oral daily    MEDICATIONS  (PRN):      Allergies  lisinopril (Swelling)      Review of Systems:   Constitutional: Ne Fever, No chills, No weakness  ENT: No visual changes, No throat pain  Cardiovascular:  No Chest Pain, No Palpitations  Respiratory:  No Cough, No Dyspnea  Gastrointestinal:  As described in HPI  Neurological: No numbness or weakness  Skin: No rash, no itching    Physical Examination:  T(C): 36.5 (09-22-21 @ 06:36), Max: 36.9 (09-21-21 @ 13:05)  HR: 72 (09-22-21 @ 06:36) (72 - 86)  BP: 106/75 (09-22-21 @ 06:36) (106/75 - 121/79)  RR: 18 (09-22-21 @ 06:36) (18 - 18)  SpO2: 97% (09-22-21 @ 06:36) (97% - 100%)      Constitutional: No acute distress.  Eye: sclera icteric  Respiratory:  No signs of respiratory distress. Lung sounds are clear bilaterally.  Cardiovascular:  S1 S2, Regular rate and rhythm.  Abdominal: Abdomen is soft, symmetric, and non-tender without distention. There are no visible lesions. Bowel sounds are present and normoactive in all four quadrants. No masses, hepatomegaly, or splenomegaly are noted.   Skin: No rashes,  Jaundice.  Neurology: AAOX3, Non-focal  Vascular: No varicose vein, No cyanosis, No edema        Data: (reviewed by attending)                        9.6    7.24  )-----------( 426      ( 22 Sep 2021 07:56 )             29.4     Hgb trend:  9.6  09-22-21 @ 07:56  9.6  09-21-21 @ 08:08  9.8  09-20-21 @ 07:48      09-22    134<L>  |  100  |  7   ----------------------------<  119<H>  4.4   |  23  |  0.59    Ca    8.7      22 Sep 2021 07:56  Phos  2.7     09-22  Mg     2.20     09-22    TPro  6.6  /  Alb  3.3  /  TBili  15.9<H>  /  DBili  x   /  AST  2187<H>  /  ALT  x   /  AlkPhos  207<H>  09-22    Liver panel trend:  TBili 15.9   /   AST 2187   /   ALT --   /   AlkP 207   /   Tptn 6.6   /   Alb 3.3    /   DBili --      09-22  TBili 14.7   /   AST 2926   /   ALT 1458   /   AlkP 190   /   Tptn 6.4   /   Alb 3.2    /   DBili --      09-21  TBili 13.9   /   AST 2877   /   ALT 1473   /   AlkP 199   /   Tptn 6.4   /   Alb 3.1    /   DBili --      09-20  TBili 14.5   /   AST 3420   /   ALT 1649   /   AlkP 217   /   Tptn 6.8   /   Alb 3.4    /   DBili --      09-20  TBili 13.0   /   AST 2958   /   ALT 1590   /   AlkP 215   /   Tptn 6.6   /   Alb 3.4    /   DBili --      09-19  TBili 11.4   /   AST 2359   /   ALT 1385   /   AlkP 202   /   Tptn 6.0   /   Alb 3.2    /   DBili --      09-19  TBili 11.5   /   AST 2230   /   ALT 1496   /   AlkP 225   /   Tptn 6.7   /   Alb 3.5    /   DBili --      09-18  TBili 9.9   /   AST 1944   /   ALT 1386   /   AlkP 220   /   Tptn 6.2   /   Alb 3.2    /   DBili --      09-18  TBili 8.9   /   AST 1539   /   ALT 1253   /   AlkP 221   /   Tptn 5.9   /   Alb 3.1    /   DBili --      09-17  TBili 8.3   /   AST 1366   /   ALT 1261   /   AlkP 223   /   Tptn 5.7   /   Alb 3.0    /   DBili --      09-17  TBili 8.5   /   AST --   /   ALT --   /   AlkP --   /   Tptn --   /   Alb --    /   DBili 7.10      09-16  TBili 9.7   /   AST 1851   /   ALT 1780   /   AlkP 269   /   Tptn 7.1   /   Alb 2.9    /   DBili --      09-16      PT/INR - ( 22 Sep 2021 07:56 )   PT: 17.0 sec;   INR: 1.52 ratio                     Hepatology progress note:     Patient is a 51y old  Female who presents with a chief complaint of Elevated Liver Enzymes (22 Sep 2021 08:34)       Admitted on: 09-17-21    We are following the patient for: elevated liver tests     Interval History:  No acute event ON, can tolerate PO intake, no fever      PAST MEDICAL & SURGICAL HISTORY:  Iron deficiency anemia    HTN (hypertension)    DM (diabetes mellitus)    Fibroid uterus    Aborted ectopic pregnancy    H/O breast augmentation        MEDICATIONS  (STANDING):  dextrose 40% Gel 15 Gram(s) Oral once  dextrose 5%. 1000 milliLiter(s) (50 mL/Hr) IV Continuous <Continuous>  dextrose 5%. 1000 milliLiter(s) (100 mL/Hr) IV Continuous <Continuous>  dextrose 50% Injectable 25 Gram(s) IV Push once  dextrose 50% Injectable 12.5 Gram(s) IV Push once  dextrose 50% Injectable 25 Gram(s) IV Push once  enoxaparin Injectable 40 milliGRAM(s) SubCutaneous daily  glucagon  Injectable 1 milliGRAM(s) IntraMuscular once  influenza   Vaccine 0.5 milliLiter(s) IntraMuscular once  insulin lispro (ADMELOG) corrective regimen sliding scale   SubCutaneous three times a day before meals  insulin lispro (ADMELOG) corrective regimen sliding scale   SubCutaneous at bedtime  phytonadione   Solution 5 milliGRAM(s) Oral daily    MEDICATIONS  (PRN):      Allergies  lisinopril (Swelling)      Review of Systems:   Constitutional: Ne Fever, No chills, No weakness  ENT: No visual changes, No throat pain  Cardiovascular:  No Chest Pain, No Palpitations  Respiratory:  No Cough, No Dyspnea  Gastrointestinal:  As described in HPI  Neurological: No numbness or weakness  Skin: No rash, no itching    Physical Examination:  T(C): 36.5 (09-22-21 @ 06:36), Max: 36.9 (09-21-21 @ 13:05)  HR: 72 (09-22-21 @ 06:36) (72 - 86)  BP: 106/75 (09-22-21 @ 06:36) (106/75 - 121/79)  RR: 18 (09-22-21 @ 06:36) (18 - 18)  SpO2: 97% (09-22-21 @ 06:36) (97% - 100%)      Constitutional: No acute distress.  Eye: sclera icteric  Respiratory:  No signs of respiratory distress. Lung sounds are clear bilaterally.  Cardiovascular:  S1 S2, Regular rate and rhythm.  Abdominal: Abdomen is soft, symmetric, and non-tender without distention. There are no visible lesions. Bowel sounds are present and normoactive in all four quadrants. No masses, hepatomegaly, or splenomegaly are noted.   Skin: No rashes,  Jaundice.  Neurology: AAOX3, Non-focal  Vascular: No varicose vein, No cyanosis, No edema        Data: (reviewed by attending)                        9.6    7.24  )-----------( 426      ( 22 Sep 2021 07:56 )             29.4     Hgb trend:  9.6  09-22-21 @ 07:56  9.6  09-21-21 @ 08:08  9.8  09-20-21 @ 07:48      09-22    134<L>  |  100  |  7   ----------------------------<  119<H>  4.4   |  23  |  0.59    Ca    8.7      22 Sep 2021 07:56  Phos  2.7     09-22  Mg     2.20     09-22    TPro  6.6  /  Alb  3.3  /  TBili  15.9<H>  /  DBili  x   /  AST  2187<H>  /  ALT  x   /  AlkPhos  207<H>  09-22    Liver panel trend:  TBili 15.9   /   AST 2187   /   ALT --   /   AlkP 207   /   Tptn 6.6   /   Alb 3.3    /   DBili --      09-22  TBili 14.7   /   AST 2926   /   ALT 1458   /   AlkP 190   /   Tptn 6.4   /   Alb 3.2    /   DBili --      09-21  TBili 13.9   /   AST 2877   /   ALT 1473   /   AlkP 199   /   Tptn 6.4   /   Alb 3.1    /   DBili --      09-20  TBili 14.5   /   AST 3420   /   ALT 1649   /   AlkP 217   /   Tptn 6.8   /   Alb 3.4    /   DBili --      09-20  TBili 13.0   /   AST 2958   /   ALT 1590   /   AlkP 215   /   Tptn 6.6   /   Alb 3.4    /   DBili --      09-19  TBili 11.4   /   AST 2359   /   ALT 1385   /   AlkP 202   /   Tptn 6.0   /   Alb 3.2    /   DBili --      09-19  TBili 11.5   /   AST 2230   /   ALT 1496   /   AlkP 225   /   Tptn 6.7   /   Alb 3.5    /   DBili --      09-18  TBili 9.9   /   AST 1944   /   ALT 1386   /   AlkP 220   /   Tptn 6.2   /   Alb 3.2    /   DBili --      09-18  TBili 8.9   /   AST 1539   /   ALT 1253   /   AlkP 221   /   Tptn 5.9   /   Alb 3.1    /   DBili --      09-17  TBili 8.3   /   AST 1366   /   ALT 1261   /   AlkP 223   /   Tptn 5.7   /   Alb 3.0    /   DBili --      09-17  TBili 8.5   /   AST --   /   ALT --   /   AlkP --   /   Tptn --   /   Alb --    /   DBili 7.10      09-16  TBili 9.7   /   AST 1851   /   ALT 1780   /   AlkP 269   /   Tptn 7.1   /   Alb 2.9    /   DBili --      09-16      PT/INR - ( 22 Sep 2021 07:56 )   PT: 17.0 sec;   INR: 1.52 ratio

## 2021-09-23 LAB
ALBUMIN SERPL ELPH-MCNC: 3.2 G/DL — LOW (ref 3.3–5)
ALP SERPL-CCNC: 197 U/L — HIGH (ref 40–120)
ALT FLD-CCNC: 1088 U/L — SIGNIFICANT CHANGE UP (ref 4–33)
ANION GAP SERPL CALC-SCNC: 13 MMOL/L — SIGNIFICANT CHANGE UP (ref 7–14)
AST SERPL-CCNC: 1574 U/L — HIGH (ref 4–32)
BILIRUB SERPL-MCNC: 16.9 MG/DL — HIGH (ref 0.2–1.2)
BUN SERPL-MCNC: 5 MG/DL — LOW (ref 7–23)
CALCIUM SERPL-MCNC: 9.1 MG/DL — SIGNIFICANT CHANGE UP (ref 8.4–10.5)
CHLORIDE SERPL-SCNC: 99 MMOL/L — SIGNIFICANT CHANGE UP (ref 98–107)
CO2 SERPL-SCNC: 23 MMOL/L — SIGNIFICANT CHANGE UP (ref 22–31)
CREAT SERPL-MCNC: 0.62 MG/DL — SIGNIFICANT CHANGE UP (ref 0.5–1.3)
FIBRINOGEN PPP-MCNC: 327 MG/DL — SIGNIFICANT CHANGE UP (ref 290–520)
GLUCOSE SERPL-MCNC: 102 MG/DL — HIGH (ref 70–99)
HCT VFR BLD CALC: 29.9 % — LOW (ref 34.5–45)
HGB BLD-MCNC: 9.8 G/DL — LOW (ref 11.5–15.5)
HSV1 AB FLD QL: NEGATIVE — SIGNIFICANT CHANGE UP
HSV2 AB FLD-ACNC: NEGATIVE — SIGNIFICANT CHANGE UP
INR BLD: 1.36 RATIO — HIGH (ref 0.88–1.16)
LACTATE SERPL-SCNC: 1.3 MMOL/L — SIGNIFICANT CHANGE UP (ref 0.5–2)
MAGNESIUM SERPL-MCNC: 2.1 MG/DL — SIGNIFICANT CHANGE UP (ref 1.6–2.6)
MCHC RBC-ENTMCNC: 21.1 PG — LOW (ref 27–34)
MCHC RBC-ENTMCNC: 32.8 GM/DL — SIGNIFICANT CHANGE UP (ref 32–36)
MCV RBC AUTO: 64.3 FL — LOW (ref 80–100)
NRBC # BLD: 0 /100 WBCS — SIGNIFICANT CHANGE UP
NRBC # FLD: 0 K/UL — SIGNIFICANT CHANGE UP
PHOSPHATE SERPL-MCNC: 2.9 MG/DL — SIGNIFICANT CHANGE UP (ref 2.5–4.5)
PLATELET # BLD AUTO: 455 K/UL — HIGH (ref 150–400)
POTASSIUM SERPL-MCNC: 4.5 MMOL/L — SIGNIFICANT CHANGE UP (ref 3.5–5.3)
POTASSIUM SERPL-SCNC: 4.5 MMOL/L — SIGNIFICANT CHANGE UP (ref 3.5–5.3)
PROT SERPL-MCNC: 6.8 G/DL — SIGNIFICANT CHANGE UP (ref 6–8.3)
PROTHROM AB SERPL-ACNC: 15.4 SEC — HIGH (ref 10.6–13.6)
RBC # BLD: 4.65 M/UL — SIGNIFICANT CHANGE UP (ref 3.8–5.2)
RBC # FLD: 20.4 % — HIGH (ref 10.3–14.5)
SODIUM SERPL-SCNC: 135 MMOL/L — SIGNIFICANT CHANGE UP (ref 135–145)
WBC # BLD: 5.62 K/UL — SIGNIFICANT CHANGE UP (ref 3.8–10.5)
WBC # FLD AUTO: 5.62 K/UL — SIGNIFICANT CHANGE UP (ref 3.8–10.5)

## 2021-09-23 PROCEDURE — 99232 SBSQ HOSP IP/OBS MODERATE 35: CPT | Mod: GC

## 2021-09-23 PROCEDURE — 99233 SBSQ HOSP IP/OBS HIGH 50: CPT | Mod: GC

## 2021-09-23 RX ORDER — CHOLESTYRAMINE 4 G/9G
4 POWDER, FOR SUSPENSION ORAL DAILY
Refills: 0 | Status: DISCONTINUED | OUTPATIENT
Start: 2021-09-23 | End: 2021-09-24

## 2021-09-23 RX ADMIN — Medication 800 MILLIGRAM(S): at 21:42

## 2021-09-23 RX ADMIN — Medication 800 MILLIGRAM(S): at 02:31

## 2021-09-23 RX ADMIN — Medication 800 MILLIGRAM(S): at 14:04

## 2021-09-23 RX ADMIN — Medication 5 MILLIGRAM(S): at 14:04

## 2021-09-23 RX ADMIN — Medication 800 MILLIGRAM(S): at 18:32

## 2021-09-23 RX ADMIN — TENOFOVIR DISOPROXIL FUMARATE 25 MILLIGRAM(S): 300 TABLET, FILM COATED ORAL at 13:00

## 2021-09-23 RX ADMIN — Medication 1: at 13:00

## 2021-09-23 RX ADMIN — Medication 800 MILLIGRAM(S): at 09:56

## 2021-09-23 NOTE — PROGRESS NOTE ADULT - SUBJECTIVE AND OBJECTIVE BOX
Patient is a 51y old  Female who presents with a chief complaint of Elevated Liver Enzymes (22 Sep 2021 08:53)      SUBJECTIVE / OVERNIGHT EVENTS: No overnight events    This morning patient feels well. Has been eating. No chest pain, abdominal pain, nausea, vomiting, diarrhea, dysuria, hematuria. Reports pruritis on arms and hips.     MEDICATIONS  (STANDING):  acyclovir   Oral Tab/Cap 800 milliGRAM(s) Oral five times a day  dextrose 40% Gel 15 Gram(s) Oral once  dextrose 5%. 1000 milliLiter(s) (50 mL/Hr) IV Continuous <Continuous>  dextrose 5%. 1000 milliLiter(s) (100 mL/Hr) IV Continuous <Continuous>  dextrose 50% Injectable 25 Gram(s) IV Push once  dextrose 50% Injectable 12.5 Gram(s) IV Push once  dextrose 50% Injectable 25 Gram(s) IV Push once  enoxaparin Injectable 40 milliGRAM(s) SubCutaneous daily  glucagon  Injectable 1 milliGRAM(s) IntraMuscular once  influenza   Vaccine 0.5 milliLiter(s) IntraMuscular once  insulin lispro (ADMELOG) corrective regimen sliding scale   SubCutaneous three times a day before meals  insulin lispro (ADMELOG) corrective regimen sliding scale   SubCutaneous at bedtime  phytonadione   Solution 5 milliGRAM(s) Oral daily  tenofovir alafenamide (VEMLIDY) 25 milliGRAM(s) Oral daily    MEDICATIONS  (PRN):      PHYSICAL EXAM:  Vital Signs Last 24 Hrs  T(C): 36.7 (23 Sep 2021 05:26), Max: 36.8 (22 Sep 2021 21:38)  T(F): 98.1 (23 Sep 2021 05:26), Max: 98.2 (22 Sep 2021 21:38)  HR: 76 (23 Sep 2021 05:26) (75 - 96)  BP: 105/64 (23 Sep 2021 05:26) (105/64 - 132/82)  BP(mean): --  RR: 17 (23 Sep 2021 05:26) (17 - 18)  SpO2: 99% (23 Sep 2021 05:26) (96% - 100%)    CONSTITUTIONAL: NAD, well-developed  EYES: +scleral icterus  ENMT: Moist oral mucosa  RESPIRATORY: Normal WOB; lungs are CTA b/l  CARDIOVASCULAR: RRR; S1/S2 present; no m/r/g; No LE edema  ABDOMEN: Soft, nontender, normoactive BS, no rebound/guarding  MUSCULOSKELETAL:  moving all extremities  NEUROLOGY: awake, A&O to person, place, and time, no asterixis   SKIN: +jaundice  ----  I&O's Summary    ----  LABS:                        9.8    5.62  )-----------( 455      ( 23 Sep 2021 07:00 )             29.9     ----  09-23    135  |  99  |  5<L>  ----------------------------<  102<H>  4.5   |  23  |  0.62    Ca    9.1      23 Sep 2021 07:00  Phos  2.9     09-23  Mg     2.10     09-23    TPro  6.8  /  Alb  3.2<L>  /  TBili  16.9<H>  /  DBili  x   /  AST  x   /  ALT  x   /  AlkPhos  197<H>  09-23    ----    RADIOLOGY & ADDITIONAL TESTS:  Results Reviewed:   Imaging Personally Reviewed:  Electrocardiogram Personally Reviewed:

## 2021-09-23 NOTE — PROGRESS NOTE ADULT - ATTENDING COMMENTS
Patient seen and examined with the liver team. I agree with the plan as above.   She is improving. I lenore continue tenofovir and acyclovir. If labs tests continue to improve, she can be discharged tomorrow with follow up.  I spoke to her at length and reviewed her overall condition

## 2021-09-23 NOTE — PROGRESS NOTE ADULT - PROBLEM SELECTOR PLAN 1
Markedly elevated transaminase on presentation. Workup for acute hepatitis sent with HbV core IgM+ and PCR 17,060 indicating acute HBV. Also found to be VZV IgM positive although lower suspicion of varicella infection given no skin lesions. Still pending HDV, HEV testing. DOUG+ but remaining autoimmune workup (mitochondrial and liver kidney ab) negative, still pending smooth muscle antibody. Metabolic causes unlikely as ceruloplasmin negative; ferritin elevated in setting of liver injury.   - Trend INR, CMP daily   - f/u pending workup: VZV PCR, HDV, HEV, SMA testing  - Vitamin K PO 5mg 3d (day 3/3)  - TAF 25mg daily  - acyclovir 800mg 5x/d for 7 days; d/c if VZV PCR returns negative Markedly elevated transaminase on presentation. Workup for acute hepatitis sent with HbV core IgM+ and PCR 17,060 indicating acute HBV. Also found to be VZV IgM positive although lower suspicion of varicella infection given no skin lesions. Still pending HDV, HEV testing. DOUG+ but remaining autoimmune workup (mitochondrial and liver kidney ab) negative, still pending smooth muscle antibody. Metabolic causes unlikely as ceruloplasmin negative; ferritin elevated in setting of liver injury.   - Trend INR, CMP daily   - f/u pending workup: VZV PCR, HDV, HEV, SMA testing  - Vitamin K PO 5mg 3d (day 3/3)  - TAF 25mg daily  - acyclovir 800mg 5x/d for 7 days; d/c if VZV PCR returns negative  - cholestyramine for pruritis

## 2021-09-23 NOTE — PROGRESS NOTE ADULT - PROBLEM SELECTOR PLAN 4
Hgb 10 on presentation with MCV 65. On Ferrous Sulfate 325 BID at home.  - Hold ferrous sulfate during acute hepatitis  - Hgb electrophoresis with elevated A2 suggestive of beta thalassemia minor

## 2021-09-23 NOTE — PROGRESS NOTE ADULT - PROBLEM SELECTOR PLAN 3
UA on presentation +LE/Nit/Bact/RBC. Possible contamination as + for epithelial cells. Asymptomatic.   - Monitor for symptoms

## 2021-09-23 NOTE — PROGRESS NOTE ADULT - ATTENDING COMMENTS
52 yo F w/ hx DM, HTN p/w elevated liver enzymes and found to be HBV+ acute vs reactivation. LFTs/INR stable. chronic microcytic anemia hemoglobin + beta thalessemia minor VZV IgM borderline positive no overt lesions. CMV prior infection/EBV past infection/HSV 1/2 past infection hep C RNA neg; AMA neg; anti LKM neg EBV and CMV PCR neg     acute vs reactivation Hep B- monitor LFT and INR  Tenofovir and acylovir   VZV PCR, HSV IgM , hep E and Hep D in lab    DOUG positive 1:320 d/w hepatology no overt joint symptoms can be positive in women without signoficant disease can f/u outpt no concern for autoimmune hepatitis    despite borderline IgM heaptology recommend treatement of VZV await VZV PCR  + pruritis secondary to elevated bilirubin cholestyramine   monitor LFTs

## 2021-09-23 NOTE — PROGRESS NOTE ADULT - ASSESSMENT
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 Risk factor includes recent tattoo 3 months ago.  - VZV IgM positive--> no h/o skin rash --> ? false positive  - No hep A/C, HCV RNA neg  - No acute EBV/CMV--> IgM neg and PCR neg  - Ceruloplasmin, Anti LKM WNL  - Iron sat 11%  - IgG, IgA WNL, IgM elevated  - Had normal AST/ALT/ALP about one year ago  - ALP/AST/ALT are trending down  - INR improving  - Tbili plateaued   - INR trending down   - No encephalopathy  - HBV DNA--> [id number]  - Started on Acyclovir 800 mg 5 tiems/day and TAF 25 mg daily on 9/22        # microcytic anemia - MCV 65, no h/o GIB.    Recommendations:  - f/u Hep E/D  - f/u VZV PCR  - f/u HSV IgM  -c/w antiviral therapy for Hep B ( TAF preferably ) + Acyclovir for possible VZV ( may stop acyclovir if VZV PCR negative)  -Monitor INR, liver enzymes, and Bili  -Avoid Hepatotoxic agents  -Rec to test for Hep B active infection/immunization on her partner and vaccinate if needed.  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 Risk factor includes recent tattoo 3 months ago.  - VZV IgM positive--> no h/o skin rash --> ? false positive  - No hep A/C, HCV RNA neg  - No acute EBV/CMV--> IgM neg and PCR neg  - Ceruloplasmin, Anti LKM WNL  - Iron sat 11%  - IgG, IgA WNL, IgM elevated  - Had normal AST/ALT/ALP about one year ago  - ALP/AST/ALT are trending down  - INR improving  - Tbili plateaued   - INR trending down   - No encephalopathy  - HBV DNA--> [id number]  - Started on Acyclovir 800 mg 5 tiems/day and TAF 25 mg daily on 9/22        # microcytic anemia - MCV 65, no h/o GIB.    Recommendations:  - f/u Hep E/D  - f/u VZV PCR  - f/u HSV IgM  -c/w antiviral therapy for Hep B ( TAF preferably ) + Acyclovir for possible VZV ( may stop acyclovir if VZV PCR negative)  - Cholestyramine BID for itching ( 2 hours before/after her medication to avoid disturbing absorption of medications)   -Monitor INR, liver enzymes, and Bili  -Avoid Hepatotoxic agents  -Rec to test for Hep B active infection/immunization on her partner and vaccinate if needed.   - follow up with outpatient hepatology

## 2021-09-23 NOTE — PROGRESS NOTE ADULT - ASSESSMENT
52yo female PMHx DM, HTN, NAGI, Fibroid and history of new tattoos 8 months ago presenting with jaundice and malaise. Found to have liver enzymes in 1000s, elevated total and direct bilirubin. Abdominal imaging with US and CT suggestive of acute hepatitis. Hep B IgM + and PCR+ consistent with acute HBV. VZV IgM+. Currently transaminase and INR downtrending although bilirubin continues to uptrend.

## 2021-09-23 NOTE — PROGRESS NOTE ADULT - SUBJECTIVE AND OBJECTIVE BOX
Gastroenterology progress note:     Patient is a 51y old  Female who presents with a chief complaint of Elevated Liver Enzymes (23 Sep 2021 07:50)       Admitted on: 09-17-21    We are following the patient for: elevated liver tests due to Hep B infection     Interval History:  No acute event ON, can tolerate Po intake, afebrile      PAST MEDICAL & SURGICAL HISTORY:  Iron deficiency anemia    HTN (hypertension)    DM (diabetes mellitus)    Fibroid uterus    Aborted ectopic pregnancy    H/O breast augmentation        MEDICATIONS  (STANDING):  acyclovir   Oral Tab/Cap 800 milliGRAM(s) Oral five times a day  dextrose 40% Gel 15 Gram(s) Oral once  dextrose 5%. 1000 milliLiter(s) (50 mL/Hr) IV Continuous <Continuous>  dextrose 5%. 1000 milliLiter(s) (100 mL/Hr) IV Continuous <Continuous>  dextrose 50% Injectable 25 Gram(s) IV Push once  dextrose 50% Injectable 12.5 Gram(s) IV Push once  dextrose 50% Injectable 25 Gram(s) IV Push once  enoxaparin Injectable 40 milliGRAM(s) SubCutaneous daily  glucagon  Injectable 1 milliGRAM(s) IntraMuscular once  influenza   Vaccine 0.5 milliLiter(s) IntraMuscular once  insulin lispro (ADMELOG) corrective regimen sliding scale   SubCutaneous three times a day before meals  insulin lispro (ADMELOG) corrective regimen sliding scale   SubCutaneous at bedtime  phytonadione   Solution 5 milliGRAM(s) Oral daily  tenofovir alafenamide (VEMLIDY) 25 milliGRAM(s) Oral daily    MEDICATIONS  (PRN):      Allergies  lisinopril (Swelling)      Review of Systems:   Constitutional: Ne Fever, No chills, No weakness  ENT: No visual changes, No throat pain  Cardiovascular:  No Chest Pain, No Palpitations  Respiratory:  No Cough, No Dyspnea  Gastrointestinal:  As described in HPI  Neurological: No numbness or weakness  Skin: No rash, no itching    Physical Examination:  T(C): 36.7 (09-23-21 @ 05:26), Max: 36.8 (09-22-21 @ 21:38)  HR: 76 (09-23-21 @ 05:26) (75 - 96)  BP: 105/64 (09-23-21 @ 05:26) (105/64 - 132/82)  RR: 17 (09-23-21 @ 05:26) (17 - 18)  SpO2: 99% (09-23-21 @ 05:26) (96% - 100%)      Constitutional: No acute distress.  eye: scler icteric  Respiratory:  No signs of respiratory distress. Lung sounds are clear bilaterally.  Cardiovascular:  S1 S2, Regular rate and rhythm.  Abdominal: Abdomen is soft, symmetric, and non-tender without distention. There are no visible lesions. Bowel sounds are present and normoactive in all four quadrants. No masses, hepatomegaly, or splenomegaly are noted.   Skin: No rashes, Jaundice.  Neurology: AAOX3, Non-focal  Vascular: No varicose vein, No cyanosis, No edema        Data: (reviewed by attending)                        9.8    5.62  )-----------( 455      ( 23 Sep 2021 07:00 )             29.9     Hgb trend:  9.8  09-23-21 @ 07:00  9.6  09-22-21 @ 07:56  9.6  09-21-21 @ 08:08      09-23    135  |  99  |  5<L>  ----------------------------<  102<H>  4.5   |  23  |  0.62    Ca    9.1      23 Sep 2021 07:00  Phos  2.9     09-23  Mg     2.10     09-23    TPro  6.8  /  Alb  3.2<L>  /  TBili  16.9<H>  /  DBili  x   /  AST  1574<H>  /  ALT  x   /  AlkPhos  197<H>  09-23    Liver panel trend:  TBili 16.9   /   AST 1574   /   ALT --   /   AlkP 197   /   Tptn 6.8   /   Alb 3.2    /   DBili --      09-23  TBili 17.0   /   AST 1909   /   ALT 1240   /   AlkP 215   /   Tptn 7.1   /   Alb 3.4    /   DBili --      09-22  TBili 15.9   /   AST 2187   /   ALT 1272   /   AlkP 207   /   Tptn 6.6   /   Alb 3.3    /   DBili --      09-22  TBili 14.7   /   AST 2926   /   ALT 1458   /   AlkP 190   /   Tptn 6.4   /   Alb 3.2    /   DBili -- 09-21  TBili 13.9   /   AST 2877   /   ALT 1473   /   AlkP 199   /   Tptn 6.4   /   Alb 3.1    /   DBili --      09-20  TBili 14.5   /   AST 3420   /   ALT 1649   /   AlkP 217   /   Tptn 6.8   /   Alb 3.4    /   DBili --      09-20  TBili 13.0   /   AST 2958   /   ALT 1590   /   AlkP 215   /   Tptn 6.6   /   Alb 3.4    /   DBili --      09-19  TBili 11.4   /   AST 2359   /   ALT 1385   /   AlkP 202   /   Tptn 6.0   /   Alb 3.2    /   DBili --      09-19  TBili 11.5   /   AST 2230   /   ALT 1496   /   AlkP 225   /   Tptn 6.7   /   Alb 3.5    /   DBili --      09-18  TBili 9.9   /   AST 1944   /   ALT 1386   /   AlkP 220   /   Tptn 6.2   /   Alb 3.2    /   DBili --      09-18  TBili 8.9   /   AST 1539   /   ALT 1253   /   AlkP 221   /   Tptn 5.9   /   Alb 3.1    /   DBili --      09-17  TBili 8.3   /   AST 1366   /   ALT 1261   /   AlkP 223   /   Tptn 5.7   /   Alb 3.0    /   DBili --      09-17  TBili 8.5   /   AST --   /   ALT --   /   AlkP --   /   Tptn --   /   Alb --    /   DBili 7.10      09-16  TBili 9.7   /   AST 1851   /   ALT 1780   /   AlkP 269   /   Tptn 7.1   /   Alb 2.9    /   DBili --      09-16      PT/INR - ( 23 Sep 2021 07:00 )   PT: 15.4 sec;   INR: 1.36 ratio                       Hepatology progress note:     Patient is a 51y old  Female who presents with a chief complaint of Elevated Liver Enzymes (23 Sep 2021 07:50)       Admitted on: 09-17-21    We are following the patient for: elevated liver tests due to Hep B infection     Interval History:  No acute event ON, can tolerate Po intake, afebrile      PAST MEDICAL & SURGICAL HISTORY:  Iron deficiency anemia    HTN (hypertension)    DM (diabetes mellitus)    Fibroid uterus    Aborted ectopic pregnancy    H/O breast augmentation        MEDICATIONS  (STANDING):  acyclovir   Oral Tab/Cap 800 milliGRAM(s) Oral five times a day  dextrose 40% Gel 15 Gram(s) Oral once  dextrose 5%. 1000 milliLiter(s) (50 mL/Hr) IV Continuous <Continuous>  dextrose 5%. 1000 milliLiter(s) (100 mL/Hr) IV Continuous <Continuous>  dextrose 50% Injectable 25 Gram(s) IV Push once  dextrose 50% Injectable 12.5 Gram(s) IV Push once  dextrose 50% Injectable 25 Gram(s) IV Push once  enoxaparin Injectable 40 milliGRAM(s) SubCutaneous daily  glucagon  Injectable 1 milliGRAM(s) IntraMuscular once  influenza   Vaccine 0.5 milliLiter(s) IntraMuscular once  insulin lispro (ADMELOG) corrective regimen sliding scale   SubCutaneous three times a day before meals  insulin lispro (ADMELOG) corrective regimen sliding scale   SubCutaneous at bedtime  phytonadione   Solution 5 milliGRAM(s) Oral daily  tenofovir alafenamide (VEMLIDY) 25 milliGRAM(s) Oral daily    MEDICATIONS  (PRN):      Allergies  lisinopril (Swelling)      Review of Systems:   Constitutional: Ne Fever, No chills, No weakness  ENT: No visual changes, No throat pain  Cardiovascular:  No Chest Pain, No Palpitations  Respiratory:  No Cough, No Dyspnea  Gastrointestinal:  As described in HPI  Neurological: No numbness or weakness  Skin: No rash, no itching    Physical Examination:  T(C): 36.7 (09-23-21 @ 05:26), Max: 36.8 (09-22-21 @ 21:38)  HR: 76 (09-23-21 @ 05:26) (75 - 96)  BP: 105/64 (09-23-21 @ 05:26) (105/64 - 132/82)  RR: 17 (09-23-21 @ 05:26) (17 - 18)  SpO2: 99% (09-23-21 @ 05:26) (96% - 100%)      Constitutional: No acute distress.  eye: scler icteric  Respiratory:  No signs of respiratory distress. Lung sounds are clear bilaterally.  Cardiovascular:  S1 S2, Regular rate and rhythm.  Abdominal: Abdomen is soft, symmetric, and non-tender without distention. There are no visible lesions. Bowel sounds are present and normoactive in all four quadrants. No masses, hepatomegaly, or splenomegaly are noted.   Skin: No rashes, Jaundice.  Neurology: AAOX3, Non-focal  Vascular: No varicose vein, No cyanosis, No edema        Data: (reviewed by attending)                        9.8    5.62  )-----------( 455      ( 23 Sep 2021 07:00 )             29.9     Hgb trend:  9.8  09-23-21 @ 07:00  9.6  09-22-21 @ 07:56  9.6  09-21-21 @ 08:08      09-23    135  |  99  |  5<L>  ----------------------------<  102<H>  4.5   |  23  |  0.62    Ca    9.1      23 Sep 2021 07:00  Phos  2.9     09-23  Mg     2.10     09-23    TPro  6.8  /  Alb  3.2<L>  /  TBili  16.9<H>  /  DBili  x   /  AST  1574<H>  /  ALT  x   /  AlkPhos  197<H>  09-23    Liver panel trend:  TBili 16.9   /   AST 1574   /   ALT --   /   AlkP 197   /   Tptn 6.8   /   Alb 3.2    /   DBili --      09-23  TBili 17.0   /   AST 1909   /   ALT 1240   /   AlkP 215   /   Tptn 7.1   /   Alb 3.4    /   DBili --      09-22  TBili 15.9   /   AST 2187   /   ALT 1272   /   AlkP 207   /   Tptn 6.6   /   Alb 3.3    /   DBili --      09-22  TBili 14.7   /   AST 2926   /   ALT 1458   /   AlkP 190   /   Tptn 6.4   /   Alb 3.2    /   DBili -- 09-21  TBili 13.9   /   AST 2877   /   ALT 1473   /   AlkP 199   /   Tptn 6.4   /   Alb 3.1    /   DBili --      09-20  TBili 14.5   /   AST 3420   /   ALT 1649   /   AlkP 217   /   Tptn 6.8   /   Alb 3.4    /   DBili --      09-20  TBili 13.0   /   AST 2958   /   ALT 1590   /   AlkP 215   /   Tptn 6.6   /   Alb 3.4    /   DBili --      09-19  TBili 11.4   /   AST 2359   /   ALT 1385   /   AlkP 202   /   Tptn 6.0   /   Alb 3.2    /   DBili --      09-19  TBili 11.5   /   AST 2230   /   ALT 1496   /   AlkP 225   /   Tptn 6.7   /   Alb 3.5    /   DBili --      09-18  TBili 9.9   /   AST 1944   /   ALT 1386   /   AlkP 220   /   Tptn 6.2   /   Alb 3.2    /   DBili --      09-18  TBili 8.9   /   AST 1539   /   ALT 1253   /   AlkP 221   /   Tptn 5.9   /   Alb 3.1    /   DBili --      09-17  TBili 8.3   /   AST 1366   /   ALT 1261   /   AlkP 223   /   Tptn 5.7   /   Alb 3.0    /   DBili --      09-17  TBili 8.5   /   AST --   /   ALT --   /   AlkP --   /   Tptn --   /   Alb --    /   DBili 7.10      09-16  TBili 9.7   /   AST 1851   /   ALT 1780   /   AlkP 269   /   Tptn 7.1   /   Alb 2.9    /   DBili --      09-16      PT/INR - ( 23 Sep 2021 07:00 )   PT: 15.4 sec;   INR: 1.36 ratio

## 2021-09-24 ENCOUNTER — TRANSCRIPTION ENCOUNTER (OUTPATIENT)
Age: 51
End: 2021-09-24

## 2021-09-24 VITALS
DIASTOLIC BLOOD PRESSURE: 80 MMHG | SYSTOLIC BLOOD PRESSURE: 120 MMHG | HEART RATE: 73 BPM | OXYGEN SATURATION: 100 % | TEMPERATURE: 99 F | RESPIRATION RATE: 18 BRPM

## 2021-09-24 DIAGNOSIS — E87.1 HYPO-OSMOLALITY AND HYPONATREMIA: ICD-10-CM

## 2021-09-24 LAB
ALBUMIN SERPL ELPH-MCNC: 3.3 G/DL — SIGNIFICANT CHANGE UP (ref 3.3–5)
ALP SERPL-CCNC: 194 U/L — HIGH (ref 40–120)
ALT FLD-CCNC: 854 U/L — HIGH (ref 4–33)
ANION GAP SERPL CALC-SCNC: 14 MMOL/L — SIGNIFICANT CHANGE UP (ref 7–14)
AST SERPL-CCNC: 1111 U/L — HIGH (ref 4–32)
BILIRUB SERPL-MCNC: 16.7 MG/DL — HIGH (ref 0.2–1.2)
BUN SERPL-MCNC: 6 MG/DL — LOW (ref 7–23)
CALCIUM SERPL-MCNC: 9.3 MG/DL — SIGNIFICANT CHANGE UP (ref 8.4–10.5)
CHLORIDE SERPL-SCNC: 95 MMOL/L — LOW (ref 98–107)
CO2 SERPL-SCNC: 21 MMOL/L — LOW (ref 22–31)
CREAT SERPL-MCNC: 0.57 MG/DL — SIGNIFICANT CHANGE UP (ref 0.5–1.3)
FIBRINOGEN PPP-MCNC: 345 MG/DL — SIGNIFICANT CHANGE UP (ref 290–520)
GLUCOSE SERPL-MCNC: 120 MG/DL — HIGH (ref 70–99)
HCT VFR BLD CALC: 28.9 % — LOW (ref 34.5–45)
HGB BLD-MCNC: 9.3 G/DL — LOW (ref 11.5–15.5)
INR BLD: 1.31 RATIO — HIGH (ref 0.88–1.16)
LACTATE SERPL-SCNC: 1.4 MMOL/L — SIGNIFICANT CHANGE UP (ref 0.5–2)
MAGNESIUM SERPL-MCNC: 2 MG/DL — SIGNIFICANT CHANGE UP (ref 1.6–2.6)
MCHC RBC-ENTMCNC: 20.7 PG — LOW (ref 27–34)
MCHC RBC-ENTMCNC: 32.2 GM/DL — SIGNIFICANT CHANGE UP (ref 32–36)
MCV RBC AUTO: 64.2 FL — LOW (ref 80–100)
NRBC # BLD: 0 /100 WBCS — SIGNIFICANT CHANGE UP
NRBC # FLD: 0 K/UL — SIGNIFICANT CHANGE UP
PHOSPHATE SERPL-MCNC: 2.9 MG/DL — SIGNIFICANT CHANGE UP (ref 2.5–4.5)
PLATELET # BLD AUTO: 447 K/UL — HIGH (ref 150–400)
POTASSIUM SERPL-MCNC: 3.9 MMOL/L — SIGNIFICANT CHANGE UP (ref 3.5–5.3)
POTASSIUM SERPL-SCNC: 3.9 MMOL/L — SIGNIFICANT CHANGE UP (ref 3.5–5.3)
PROT SERPL-MCNC: 6.6 G/DL — SIGNIFICANT CHANGE UP (ref 6–8.3)
PROTHROM AB SERPL-ACNC: 14.7 SEC — HIGH (ref 10.6–13.6)
RBC # BLD: 4.5 M/UL — SIGNIFICANT CHANGE UP (ref 3.8–5.2)
RBC # FLD: 20.3 % — HIGH (ref 10.3–14.5)
SMOOTH MUSCLE AB SER-ACNC: SIGNIFICANT CHANGE UP
SODIUM SERPL-SCNC: 130 MMOL/L — LOW (ref 135–145)
WBC # BLD: 5.2 K/UL — SIGNIFICANT CHANGE UP (ref 3.8–10.5)
WBC # FLD AUTO: 5.2 K/UL — SIGNIFICANT CHANGE UP (ref 3.8–10.5)

## 2021-09-24 PROCEDURE — 99232 SBSQ HOSP IP/OBS MODERATE 35: CPT | Mod: GC

## 2021-09-24 PROCEDURE — 99239 HOSP IP/OBS DSCHRG MGMT >30: CPT

## 2021-09-24 RX ORDER — ERGOCALCIFEROL 1.25 MG/1
1 CAPSULE ORAL
Qty: 0 | Refills: 0 | DISCHARGE

## 2021-09-24 RX ORDER — ACYCLOVIR SODIUM 500 MG
1 VIAL (EA) INTRAVENOUS
Qty: 23 | Refills: 0
Start: 2021-09-24 | End: 2021-09-28

## 2021-09-24 RX ORDER — TENOFOVIR DISOPROXIL FUMARATE 300 MG/1
1 TABLET, FILM COATED ORAL
Qty: 30 | Refills: 0
Start: 2021-09-24 | End: 2021-10-23

## 2021-09-24 RX ORDER — METFORMIN HYDROCHLORIDE 850 MG/1
1 TABLET ORAL
Qty: 0 | Refills: 0 | DISCHARGE

## 2021-09-24 RX ORDER — CHOLESTYRAMINE 4 G/9G
4 POWDER, FOR SUSPENSION ORAL
Qty: 240 | Refills: 0
Start: 2021-09-24 | End: 2021-10-23

## 2021-09-24 RX ORDER — CETIRIZINE HYDROCHLORIDE 10 MG/1
1 TABLET ORAL
Qty: 0 | Refills: 0 | DISCHARGE

## 2021-09-24 RX ORDER — AMLODIPINE BESYLATE 2.5 MG/1
1 TABLET ORAL
Qty: 0 | Refills: 0 | DISCHARGE

## 2021-09-24 RX ORDER — IBUPROFEN 200 MG
1 TABLET ORAL
Qty: 0 | Refills: 0 | DISCHARGE

## 2021-09-24 RX ORDER — ASCORBIC ACID 60 MG
0 TABLET,CHEWABLE ORAL
Qty: 0 | Refills: 0 | DISCHARGE

## 2021-09-24 RX ORDER — ZINC SULFATE TAB 220 MG (50 MG ZINC EQUIVALENT) 220 (50 ZN) MG
0 TAB ORAL
Qty: 0 | Refills: 0 | DISCHARGE

## 2021-09-24 RX ADMIN — Medication 2: at 12:28

## 2021-09-24 RX ADMIN — TENOFOVIR DISOPROXIL FUMARATE 25 MILLIGRAM(S): 300 TABLET, FILM COATED ORAL at 12:24

## 2021-09-24 RX ADMIN — Medication 800 MILLIGRAM(S): at 02:13

## 2021-09-24 RX ADMIN — Medication 800 MILLIGRAM(S): at 14:58

## 2021-09-24 RX ADMIN — CHOLESTYRAMINE 4 GRAM(S): 4 POWDER, FOR SUSPENSION ORAL at 09:03

## 2021-09-24 RX ADMIN — Medication 800 MILLIGRAM(S): at 11:39

## 2021-09-24 NOTE — PROGRESS NOTE ADULT - ATTENDING COMMENTS
50 yo F w/ hx DM, HTN p/w elevated liver enzymes and found to be HBV+ acute vs reactivation. LFTs/INR stable. chronic microcytic anemia hemoglobin + beta thalassemia minor VZV IgM borderline positive no overt lesions. CMV prior infection/EBV past infection/HSV 1/2 past infection hep C RNA neg; AMA neg; anti LKM neg EBV and CMV PCR neg HSV IgM neg     acute vs reactivation Hep B-   Tenofovir and acyclovir   VZV PCR, , hep E and Hep D in lab    DOUG positive 1:320 d/w hepatology no overt joint symptoms can be positive in women without significant disease can f/u outpt no concern for autoimmune hepatitis    despite borderline IgM hepatology recommend treatement of VZV await VZV PCR  + pruritis secondary to elevated bilirubin cholestyramine   monitor LFTs    A1c- 5.9 discharge off metformin   discharge today with outpt f/u hepatology and PCP

## 2021-09-24 NOTE — PROGRESS NOTE ADULT - SUBJECTIVE AND OBJECTIVE BOX
Patient is a 51y old  Female who presents with a chief complaint of Elevated Liver Enzymes (23 Sep 2021 08:38)      SUBJECTIVE / OVERNIGHT EVENTS: No overnight events    This morning patient is feeling well. Reports some intermittent pruritis. Denies chest pain, SOB, abdominal pain, nausea, vomiting, dysuria, urinary frequency, LE swelling, joint pain, rash.     MEDICATIONS  (STANDING):  acyclovir   Oral Tab/Cap 800 milliGRAM(s) Oral five times a day  cholestyramine Powder (Sugar-Free) 4 Gram(s) Oral daily  dextrose 40% Gel 15 Gram(s) Oral once  dextrose 5%. 1000 milliLiter(s) (50 mL/Hr) IV Continuous <Continuous>  dextrose 5%. 1000 milliLiter(s) (100 mL/Hr) IV Continuous <Continuous>  dextrose 50% Injectable 25 Gram(s) IV Push once  dextrose 50% Injectable 12.5 Gram(s) IV Push once  dextrose 50% Injectable 25 Gram(s) IV Push once  enoxaparin Injectable 40 milliGRAM(s) SubCutaneous daily  glucagon  Injectable 1 milliGRAM(s) IntraMuscular once  influenza   Vaccine 0.5 milliLiter(s) IntraMuscular once  insulin lispro (ADMELOG) corrective regimen sliding scale   SubCutaneous three times a day before meals  insulin lispro (ADMELOG) corrective regimen sliding scale   SubCutaneous at bedtime  tenofovir alafenamide (VEMLIDY) 25 milliGRAM(s) Oral daily    MEDICATIONS  (PRN):  artificial  tears Solution 1 Drop(s) Both EYES two times a day PRN Dry Eyes      PHYSICAL EXAM:  Vital Signs Last 24 Hrs  T(C): 36.6 (24 Sep 2021 05:26), Max: 37.3 (23 Sep 2021 21:23)  T(F): 97.8 (24 Sep 2021 05:26), Max: 99.1 (23 Sep 2021 21:23)  HR: 72 (24 Sep 2021 05:26) (68 - 84)  BP: 124/79 (24 Sep 2021 05:26) (115/69 - 127/84)  BP(mean): --  RR: 17 (24 Sep 2021 05:26) (17 - 17)  SpO2: 100% (24 Sep 2021 05:26) (98% - 100%)    CONSTITUTIONAL: NAD, well-developed  EYES: +scleral icterus  ENMT: Moist oral mucosa  RESPIRATORY: Normal WOB; lungs are CTA b/l  CARDIOVASCULAR: RRR; S1/S2 present; no m/r/g; No LE edema  ABDOMEN: Soft, nontender, no rebound/guarding; +hepatomegaly   MUSCULOSKELETAL:  moving all extremities  NEUROLOGY: awake, A&O to person, place, and time, no asterixis   SKIN: No rashes  ----  I&O's Summary    ----  LABS:                        9.3    5.20  )-----------( 447      ( 24 Sep 2021 07:08 )             28.9     ----  09-23    135  |  99  |  5<L>  ----------------------------<  102<H>  4.5   |  23  |  0.62    Ca    9.1      23 Sep 2021 07:00  Phos  2.9     09-23  Mg     2.10     09-23    TPro  6.8  /  Alb  3.2<L>  /  TBili  16.9<H>  /  DBili  x   /  AST  1574<H>  /  ALT  1088  /  AlkPhos  197<H>  09-23    ----    RADIOLOGY & ADDITIONAL TESTS:  Results Reviewed:   Imaging Personally Reviewed:  Electrocardiogram Personally Reviewed:

## 2021-09-24 NOTE — PROGRESS NOTE ADULT - NSPROGADDITIONALINFOA_GEN_ALL_CORE
Sanjeev Mahoney MS4  [id number]
Sanjeev Mahoney MS4  [id number]
Sanjeev Mahoney MS4  [id number]
Sanjeev Mahoney MS4  [id number]
Sanjeev Mahoney MS4  [id number]

## 2021-09-24 NOTE — PROGRESS NOTE ADULT - ATTENDING COMMENTS
Patient seen and examined with the liver team. I agree with the plan as above.  Acute vs reactivation HBV doing well on Vemlidy.  She will need follow in our office upon discharge

## 2021-09-24 NOTE — PROGRESS NOTE ADULT - PROBLEM SELECTOR PROBLEM 1
Acute hepatitis

## 2021-09-24 NOTE — PROGRESS NOTE ADULT - PROBLEM SELECTOR PLAN 2
On home metformin 500 BID  - Low Sliding Scale  - CCD Na 130 9/24. Possibly pseudohyponatremia in setting of cholestatic jaundice.   -Check serum osmolality

## 2021-09-24 NOTE — PROGRESS NOTE ADULT - PROBLEM SELECTOR PLAN 6
DVT ppx: SQL  Diet: CCD  Dispo: Home today On home amlodipine 5mg. Normotensive on presentation  - Continue to monitor

## 2021-09-24 NOTE — PROGRESS NOTE ADULT - ATTENDING SUPERVISION STATEMENT
Fellow
Resident
Resident
Student

## 2021-09-24 NOTE — PROGRESS NOTE ADULT - PROBLEM SELECTOR PLAN 4
Hgb 10 on presentation with MCV 65. On Ferrous Sulfate 325 BID at home.  - Hold ferrous sulfate during acute hepatitis  - Hgb electrophoresis with elevated A2 suggestive of beta thalassemia minor UA on presentation +LE/Nit/Bact/RBC. Possible contamination as + for epithelial cells. Asymptomatic.   - Monitor for symptoms

## 2021-09-24 NOTE — PROGRESS NOTE ADULT - ASSESSMENT
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 Risk factor includes recent tattoo 3 months ago.  - VZV IgM positive--> no h/o skin rash --> ? false positive  - No hep A/C, HCV RNA neg  - No acute EBV/CMV--> IgM neg and PCR neg  - Ceruloplasmin, Anti LKM WNL  - Iron sat 11%  - IgG, IgA WNL, IgM elevated  - Had normal AST/ALT/ALP about one year ago  - ALP/AST/ALT are trending down  - INR improving  - Tbili plateaued   - INR trending down   - No encephalopathy  - HBV DNA--> 62580  - Started on Acyclovir 800 mg 5 tiems/day and TAF 25 mg daily on 9/22        # microcytic anemia - MCV 65, no h/o GIB.    Recommendations:  - c/w antiviral therapy for Hep B ( TAF preferably ) + Acyclovir for possible VZV  - Cholestyramine BID for itching ( 2 hours before/after her medication to avoid disturbing absorption of medications)   -Monitor INR, liver enzymes, and Bili  -Avoid Hepatotoxic agents  -Rec to test for Hep B active infection/immunization on her partner and vaccinate if needed.   - follow up with outpatient hepatology    Matthew Kirby, PGY5  Gastroenterology/Hepatology Fellow  Available on [employer] Teams  [phone] ([facility] Short Range Pager)  [phone] (Long Range Pager)    After 5pm, please contact the on-call GI fellow. [phone]

## 2021-09-24 NOTE — PROGRESS NOTE ADULT - ASSESSMENT
50yo female PMHx DM, HTN, NAGI, Fibroid and history of new tattoos 8 months ago presenting with jaundice and malaise. Found to have liver enzymes in 1000s, elevated total and direct bilirubin. Abdominal imaging with US and CT suggestive of acute hepatitis. Hep B IgM + and PCR+ consistent with acute HBV. VZV IgM+. Currently transaminase and INR downtrending.

## 2021-09-24 NOTE — DISCHARGE NOTE NURSING/CASE MANAGEMENT/SOCIAL WORK - PATIENT PORTAL LINK FT
You can access the FollowMyHealth Patient Portal offered by [facility] by registering at the following website: [email or web address]. By joining [facility]’s FollowMyHealth portal, you will also be able to view your health information using other applications (apps) compatible with our system.

## 2021-09-24 NOTE — PROGRESS NOTE ADULT - PROBLEM SELECTOR PLAN 5
On home amlodipine 5mg. Normotensive on presentation  - Continue to monitor Hgb 10 on presentation with MCV 65. On Ferrous Sulfate 325 BID at home.  - Hold ferrous sulfate during acute hepatitis  - Hgb electrophoresis with elevated A2 suggestive of beta thalassemia minor

## 2021-09-24 NOTE — PROGRESS NOTE ADULT - PROBLEM SELECTOR PLAN 3
UA on presentation +LE/Nit/Bact/RBC. Possible contamination as + for epithelial cells. Asymptomatic.   - Monitor for symptoms On home metformin 500 BID  - Low Sliding Scale  - CCD

## 2021-09-24 NOTE — PROGRESS NOTE ADULT - PROBLEM SELECTOR PLAN 1
Markedly elevated transaminase on presentation. Workup for acute hepatitis sent with HbV core IgM+ and PCR 17,060 indicating acute HBV. Also found to be VZV IgM positive although lower suspicion of varicella infection given no skin lesions. Still pending HDV, HEV testing. DOUG+ but remaining autoimmune workup (mitochondrial and liver kidney ab) negative, still pending smooth muscle antibody. Metabolic causes unlikely as ceruloplasmin negative; ferritin elevated in setting of liver injury.   - Trend INR, CMP daily   - f/u pending workup: VZV PCR, HDV, HEV, SMA testing  - TAF 25mg daily  - acyclovir 800mg 5x/d for 7 days; d/c if VZV PCR returns negative  - cholestyramine 4g BID for pruritis

## 2021-09-24 NOTE — PROGRESS NOTE ADULT - SUBJECTIVE AND OBJECTIVE BOX
Gastroenterology/Hepatology Progress Note      Interval Events:   - no acute events overnight    Allergies:  lisinopril (Swelling)      Hospital Medications:  acyclovir   Oral Tab/Cap 800 milliGRAM(s) Oral five times a day  artificial  tears Solution 1 Drop(s) Both EYES two times a day PRN  cholestyramine Powder (Sugar-Free) 4 Gram(s) Oral daily  dextrose 40% Gel 15 Gram(s) Oral once  dextrose 5%. 1000 milliLiter(s) IV Continuous <Continuous>  dextrose 5%. 1000 milliLiter(s) IV Continuous <Continuous>  dextrose 50% Injectable 25 Gram(s) IV Push once  dextrose 50% Injectable 12.5 Gram(s) IV Push once  dextrose 50% Injectable 25 Gram(s) IV Push once  enoxaparin Injectable 40 milliGRAM(s) SubCutaneous daily  glucagon  Injectable 1 milliGRAM(s) IntraMuscular once  influenza   Vaccine 0.5 milliLiter(s) IntraMuscular once  insulin lispro (ADMELOG) corrective regimen sliding scale   SubCutaneous three times a day before meals  insulin lispro (ADMELOG) corrective regimen sliding scale   SubCutaneous at bedtime  tenofovir alafenamide (VEMLIDY) 25 milliGRAM(s) Oral daily        PHYSICAL EXAM:   Vital Signs:  Vital Signs Last 24 Hrs  T(C): 36.6 (24 Sep 2021 05:26), Max: 37.3 (23 Sep 2021 21:23)  T(F): 97.8 (24 Sep 2021 05:26), Max: 99.1 (23 Sep 2021 21:23)  HR: 72 (24 Sep 2021 05:26) (68 - 84)  BP: 124/79 (24 Sep 2021 05:26) (115/69 - 127/84)  BP(mean): --  RR: 17 (24 Sep 2021 05:26) (17 - 17)  SpO2: 100% (24 Sep 2021 05:26) (98% - 100%)  Daily     Daily     GENERAL:  No acute distress  HEENT:  NCAT, + scleral icterus  CHEST: no resp distress  HEART:  RRR  ABDOMEN:  Soft, non-tender, non-distended, normoactive bowel sounds, no masses,  EXTREMITIES:  No cyanosis, clubbing, or edema  SKIN:  No rash/erythema/ecchymoses/petechiae/wounds/abscess/warm/dry  NEURO:  Alert and oriented x 3, no asterixis, no tremor    LABS:                        9.3    5.20  )-----------( 447      ( 24 Sep 2021 07:08 )             28.9     Mean Cell Volume: 64.2 fL (09-24-21 @ 07:08)    09-24    130<L>  |  95<L>  |  6<L>  ----------------------------<  120<H>  3.9   |  21<L>  |  0.57    Ca    9.3      24 Sep 2021 07:08  Phos  2.9     09-24  Mg     2.00     09-24    TPro  6.6  /  Alb  3.3  /  TBili  16.7<H>  /  DBili  x   /  AST  1111<H>  /  ALT  854<H>  /  AlkPhos  194<H>  09-24    LIVER FUNCTIONS - ( 24 Sep 2021 07:08 )  Alb: 3.3 g/dL / Pro: 6.6 g/dL / ALK PHOS: 194 U/L / ALT: 854 U/L / AST: 1111 U/L / GGT: x           PT/INR - ( 24 Sep 2021 07:08 )   PT: 14.7 sec;   INR: 1.31 ratio                   Imaging:           Hepatology Progress Note      Interval Events:   - no acute events overnight    Allergies:  lisinopril (Swelling)      Hospital Medications:  acyclovir   Oral Tab/Cap 800 milliGRAM(s) Oral five times a day  artificial  tears Solution 1 Drop(s) Both EYES two times a day PRN  cholestyramine Powder (Sugar-Free) 4 Gram(s) Oral daily  dextrose 40% Gel 15 Gram(s) Oral once  dextrose 5%. 1000 milliLiter(s) IV Continuous <Continuous>  dextrose 5%. 1000 milliLiter(s) IV Continuous <Continuous>  dextrose 50% Injectable 25 Gram(s) IV Push once  dextrose 50% Injectable 12.5 Gram(s) IV Push once  dextrose 50% Injectable 25 Gram(s) IV Push once  enoxaparin Injectable 40 milliGRAM(s) SubCutaneous daily  glucagon  Injectable 1 milliGRAM(s) IntraMuscular once  influenza   Vaccine 0.5 milliLiter(s) IntraMuscular once  insulin lispro (ADMELOG) corrective regimen sliding scale   SubCutaneous three times a day before meals  insulin lispro (ADMELOG) corrective regimen sliding scale   SubCutaneous at bedtime  tenofovir alafenamide (VEMLIDY) 25 milliGRAM(s) Oral daily        PHYSICAL EXAM:   Vital Signs:  Vital Signs Last 24 Hrs  T(C): 36.6 (24 Sep 2021 05:26), Max: 37.3 (23 Sep 2021 21:23)  T(F): 97.8 (24 Sep 2021 05:26), Max: 99.1 (23 Sep 2021 21:23)  HR: 72 (24 Sep 2021 05:26) (68 - 84)  BP: 124/79 (24 Sep 2021 05:26) (115/69 - 127/84)  BP(mean): --  RR: 17 (24 Sep 2021 05:26) (17 - 17)  SpO2: 100% (24 Sep 2021 05:26) (98% - 100%)  Daily     Daily     GENERAL:  No acute distress  HEENT:  NCAT, + scleral icterus  CHEST: no resp distress  HEART:  RRR  ABDOMEN:  Soft, non-tender, non-distended, normoactive bowel sounds, no masses,  EXTREMITIES:  No cyanosis, clubbing, or edema  SKIN:  No rash/erythema/ecchymoses/petechiae/wounds/abscess/warm/dry  NEURO:  Alert and oriented x 3, no asterixis, no tremor    LABS:                        9.3    5.20  )-----------( 447      ( 24 Sep 2021 07:08 )             28.9     Mean Cell Volume: 64.2 fL (09-24-21 @ 07:08)    09-24    130<L>  |  95<L>  |  6<L>  ----------------------------<  120<H>  3.9   |  21<L>  |  0.57    Ca    9.3      24 Sep 2021 07:08  Phos  2.9     09-24  Mg     2.00     09-24    TPro  6.6  /  Alb  3.3  /  TBili  16.7<H>  /  DBili  x   /  AST  1111<H>  /  ALT  854<H>  /  AlkPhos  194<H>  09-24    LIVER FUNCTIONS - ( 24 Sep 2021 07:08 )  Alb: 3.3 g/dL / Pro: 6.6 g/dL / ALK PHOS: 194 U/L / ALT: 854 U/L / AST: 1111 U/L / GGT: x           PT/INR - ( 24 Sep 2021 07:08 )   PT: 14.7 sec;   INR: 1.31 ratio                   Imaging:

## 2021-09-24 NOTE — DISCHARGE NOTE NURSING/CASE MANAGEMENT/SOCIAL WORK - NSDCFUADDAPPT_GEN_ALL_CORE_FT
Hepatology will call you to schedule an appointment for follow up. Please make sure to follow up with them within 1 week to follow up on your condition.

## 2021-09-24 NOTE — PROGRESS NOTE ADULT - REASON FOR ADMISSION
Elevated Liver Enzymes

## 2021-09-27 LAB — VZV DNA, PCR RESULT: NEGATIVE — SIGNIFICANT CHANGE UP

## 2021-10-01 LAB
HDV IGM SER QL IA: SIGNIFICANT CHANGE UP
HEV IGM SER QL: ABNORMAL

## 2021-10-13 LAB — HDV AB SER-ACNC: SIGNIFICANT CHANGE UP

## 2021-11-03 NOTE — PATIENT PROFILE ADULT - NS PRO AD NO ADVANCE DIRECTIVE
Pt presents to the ed with c.o right leg pain. Pt states that she has had a dvt for 3 weeks. PT states that she is on zarelto. Rating 9/10 pain at this time.       Jack Singh [place]  23/36/16 [id number] No

## 2023-03-22 NOTE — ED ADULT NURSE NOTE - NSHISCREENINGQ1_ED_A_ED
" Anesthesia Evaluation     Patient summary reviewed and Nursing notes reviewed   NPO Solid Status: > 8 hours  NPO Liquid Status: > 2 hours           Airway   Mallampati: II  TM distance: >3 FB  Neck ROM: full  No difficulty expected  Dental      Pulmonary    Cardiovascular         Neuro/Psych  GI/Hepatic/Renal/Endo      Musculoskeletal     Abdominal    Substance History      OB/GYN          Other                        Anesthesia Plan    ASA 2     MAC     (I have reviewed the patient's history and chart with the patient, including all pertinent laboratory results and imaging. I have explained the risks of anesthesia including but not limited to dental damage, corneal abrasion, nerve injury, MI, stroke, aspiration, and death. Patient has agreed to proceed.     /85 (BP Location: Left arm, Patient Position: Lying)   Pulse 88   Temp 36.3 °C (97.3 °F) (Temporal)   Resp 16   Ht 165.1 cm (65\")   Wt 53.1 kg (117 lb)   SpO2 99%   BMI 19.47 kg/m²   )  intravenous induction     Anesthetic plan, risks, benefits, and alternatives have been provided, discussed and informed consent has been obtained with: patient.        CODE STATUS:       "
No

## 2024-04-04 NOTE — ED PROVIDER NOTE - INTERPRETATION
sinus, rate 94 sinus, rate 94, inverted T III, biphasic T V2-4 Review of patient records, including history, laboratory data, imaging. Patient evaluation and assessment. Coordination of care. Time excludes teaching

## 2024-05-22 NOTE — ED ADULT TRIAGE NOTE - TEMPERATURE IN FAHRENHEIT (DEGREES F)
[FreeTextEntry1] : Impression: This 69-year-old female patient with history of anxiety lumbar spinal stenosis insomnia hyperlipidemia osteoporosis presents with a 1 year history of intermittent paresthesia which would involve the left forehead and also left upper extremity as detailed above.  The left forehead complaint is significantly moderated.  Patient has chronic multifocal pain including cervical.  Neurological exam in the setting is unremarkable without evidence of central or peripheral nervous system dysfunction in spite of the patient's complaints of paresthesia.  Suspect the left temporal paresthesias of benign symptom.  The left upper extremity paresthesia could be due to a mild left C6 radiculopathy.  Rule out left carpal tunnel syndrome.  Recommendations: MRI cervical spine non contrast.  EMG/NCV left upper extremity.  Consider physical therapy to the cervical spine. 98.9

## 2024-07-30 NOTE — ED ADULT TRIAGE NOTE - TEMPERATURE IN FAHRENHEIT (DEGREES F)
Zoryve Counseling:  I discussed with the patient that Zoryve is not for use in the eyes, mouth or vagina. The most commonly reported side effects include diarrhea, headache, insomnia, application site pain, upper respiratory tract infections, and urinary tract infections.  All of the patient's questions and concerns were addressed. Olanzapine Pregnancy And Lactation Text: This medication is pregnancy category C.   There are no adequate and well controlled trials with olanzapine in pregnant females.  Olanzapine should be used during pregnancy only if the potential benefit justifies the potential risk to the fetus.   In a study in lactating healthy women, olanzapine was excreted in breast milk.  It is recommended that women taking olanzapine should not breast feed. 98.5 Taltz Counseling: I discussed with the patient the risks of ixekizumab including but not limited to immunosuppression, serious infections, worsening of inflammatory bowel disease and drug reactions.  The patient understands that monitoring is required including a PPD at baseline and must alert us or the primary physician if symptoms of infection or other concerning signs are noted. Sarecycline Counseling: Patient advised regarding possible photosensitivity and discoloration of the teeth, skin, lips, tongue and gums.  Patient instructed to avoid sunlight, if possible.  When exposed to sunlight, patients should wear protective clothing, sunglasses, and sunscreen.  The patient was instructed to call the office immediately if the following severe adverse effects occur:  hearing changes, easy bruising/bleeding, severe headache, or vision changes.  The patient verbalized understanding of the proper use and possible adverse effects of sarecycline.  All of the patient's questions and concerns were addressed. Birth Control Pills Counseling: Birth Control Pill Counseling: I discussed with the patient the potential side effects of OCPs including but not limited to increased risk of stroke, heart attack, thrombophlebitis, deep venous thrombosis, hepatic adenomas, breast changes, GI upset, headaches, and depression.  The patient verbalized understanding of the proper use and possible adverse effects of OCPs. All of the patient's questions and concerns were addressed. Siliq Pregnancy And Lactation Text: The risk during pregnancy and breastfeeding is uncertain with this medication. Carac Counseling:  I discussed with the patient the risks of Carac including but not limited to erythema, scaling, itching, weeping, crusting, and pain. Bimzelx Counseling:  I discussed with the patient the risks of Bimzelx including but not limited to depression, immunosuppression, allergic reactions and infections.  The patient understands that monitoring is required including a PPD at baseline and must alert us or the primary physician if symptoms of infection or other concerning signs are noted. Cyclophosphamide Pregnancy And Lactation Text: This medication is Pregnancy Category D and it isn't considered safe during pregnancy. This medication is excreted in breast milk. Protopic Counseling: Patient may experience a mild burning sensation during topical application. Protopic is not approved in children less than 2 years of age. There have been case reports of hematologic and skin malignancies in patients using topical calcineurin inhibitors although causality is questionable. Elidel Counseling: Patient may experience a mild burning sensation during topical application. Elidel is not approved in children less than 2 years of age. There have been case reports of hematologic and skin malignancies in patients using topical calcineurin inhibitors although causality is questionable. Libtayo Pregnancy And Lactation Text: This medication is contraindicated in pregnancy and when breast feeding. Soolantra Pregnancy And Lactation Text: This medication is Pregnancy Category C. This medication is considered safe during breast feeding. Azithromycin Pregnancy And Lactation Text: This medication is considered safe during pregnancy and is also secreted in breast milk. Hydroxychloroquine Counseling:  I discussed with the patient that a baseline ophthalmologic exam is needed at the start of therapy and every year thereafter while on therapy. A CBC may also be warranted for monitoring.  The side effects of this medication were discussed with the patient, including but not limited to agranulocytosis, aplastic anemia, seizures, rashes, retinopathy, and liver toxicity. Patient instructed to call the office should any adverse effect occur.  The patient verbalized understanding of the proper use and possible adverse effects of Plaquenil.  All the patient's questions and concerns were addressed. Sski Pregnancy And Lactation Text: This medication is Pregnancy Category D and isn't considered safe during pregnancy. It is excreted in breast milk. Itraconazole Pregnancy And Lactation Text: This medication is Pregnancy Category C and it isn't know if it is safe during pregnancy. It is also excreted in breast milk. Clofazimine Pregnancy And Lactation Text: This medication is Pregnancy Category C and isn't considered safe during pregnancy. It is excreted in breast milk. Litfulo Counseling: I discussed with the patient the risks of Litfulo therapy including but not limited to upper respiratory tract infections, shingles, cold sores, and nausea. Live vaccines should be avoided.  This medication has been linked to serious infections; higher rate of mortality; malignancy and lymphoproliferative disorders; major adverse cardiovascular events; thrombosis; gastrointestinal perforations; neutropenia; lymphopenia; anemia; liver enzyme elevations; and lipid elevations. Klisyri Pregnancy And Lactation Text: It is unknown if this medication can harm a developing fetus or if it is excreted in breast milk. Topical Steroids Applications Pregnancy And Lactation Text: Most topical steroids are considered safe to use during pregnancy and lactation.  Any topical steroid applied to the breast or nipple should be washed off before breastfeeding. Hyrimoz Counseling:  I discussed with the patient the risks of adalimumab including but not limited to myelosuppression, immunosuppression, autoimmune hepatitis, demyelinating diseases, lymphoma, and serious infections.  The patient understands that monitoring is required including a PPD at baseline and must alert us or the primary physician if symptoms of infection or other concerning signs are noted. Sotyktu Pregnancy And Lactation Text: There is insufficient data to evaluate whether or not Sotyktu is safe to use during pregnancy.? ?It is not known if Sotyktu passes into breast milk and whether or not it is safe to use when breastfeeding.?? Hydroxyzine Counseling: Patient advised that the medication is sedating and not to drive a car after taking this medication.  Patient informed of potential adverse effects including but not limited to dry mouth, urinary retention, and blurry vision.  The patient verbalized understanding of the proper use and possible adverse effects of hydroxyzine.  All of the patient's questions and concerns were addressed. High Dose Vitamin A Counseling: Side effects reviewed, pt to contact office should one occur. Erythromycin Pregnancy And Lactation Text: This medication is Pregnancy Category B and is considered safe during pregnancy. It is also excreted in breast milk. Odomzo Counseling- I discussed with the patient the risks of Odomzo including but not limited to nausea, vomiting, diarrhea, constipation, weight loss, changes in the sense of taste, decreased appetite, muscle spasms, and hair loss.  The patient verbalized understanding of the proper use and possible adverse effects of Odomzo.  All of the patient's questions and concerns were addressed. Bactrim Counseling:  I discussed with the patient the risks of sulfa antibiotics including but not limited to GI upset, allergic reaction, drug rash, diarrhea, dizziness, photosensitivity, and yeast infections.  Rarely, more serious reactions can occur including but not limited to aplastic anemia, agranulocytosis, methemoglobinemia, blood dyscrasias, liver or kidney failure, lung infiltrates or desquamative/blistering drug rashes. Protopic Pregnancy And Lactation Text: This medication is Pregnancy Category C. It is unknown if this medication is excreted in breast milk when applied topically. Birth Control Pills Pregnancy And Lactation Text: This medication should be avoided if pregnant and for the first 30 days post-partum. Hydroxychloroquine Pregnancy And Lactation Text: This medication has been shown to cause fetal harm but it isn't assigned a Pregnancy Risk Category. There are small amounts excreted in breast milk. Simponi Counseling:  I discussed with the patient the risks of golimumab including but not limited to myelosuppression, immunosuppression, autoimmune hepatitis, demyelinating diseases, lymphoma, and serious infections.  The patient understands that monitoring is required including a PPD at baseline and must alert us or the primary physician if symptoms of infection or other concerning signs are noted. Topical Retinoid counseling:  Patient advised to apply a pea-sized amount only at bedtime and wait 30 minutes after washing their face before applying.  If too drying, patient may add a non-comedogenic moisturizer. The patient verbalized understanding of the proper use and possible adverse effects of retinoids.  All of the patient's questions and concerns were addressed. Oral Minoxidil Counseling- I discussed with the patient the risks of oral minoxidil including but not limited to shortness of breath, swelling of the feet or ankles, dizziness, lightheadedness, unwanted hair growth and allergic reaction.  The patient verbalized understanding of the proper use and possible adverse effects of oral minoxidil.  All of the patient's questions and concerns were addressed. Zoryve Pregnancy And Lactation Text: It is unknown if this medication can cause problems during pregnancy and breastfeeding. Topical Sulfur Applications Counseling: Topical Sulfur Counseling: Patient counseled that this medication may cause skin irritation or allergic reactions.  In the event of skin irritation, the patient was advised to reduce the amount of the drug applied or use it less frequently.   The patient verbalized understanding of the proper use and possible adverse effects of topical sulfur application.  All of the patient's questions and concerns were addressed. Cyclosporine Counseling:  I discussed with the patient the risks of cyclosporine including but not limited to hypertension, gingival hyperplasia,myelosuppression, immunosuppression, liver damage, kidney damage, neurotoxicity, lymphoma, and serious infections. The patient understands that monitoring is required including baseline blood pressure, CBC, CMP, lipid panel and uric acid, and then 1-2 times monthly CMP and blood pressure. Bimzelx Pregnancy And Lactation Text: This medication crosses the placenta and the safety is uncertain during pregnancy. It is unknown if this medication is present in breast milk. Sarecycline Pregnancy And Lactation Text: This medication is Pregnancy Category D and not consider safe during pregnancy. It is also excreted in breast milk. Carac Pregnancy And Lactation Text: This medication is Pregnancy Category X and contraindicated in pregnancy and in women who may become pregnant. It is unknown if this medication is excreted in breast milk. High Dose Vitamin A Pregnancy And Lactation Text: High dose vitamin A therapy is contraindicated during pregnancy and breast feeding. Metronidazole Counseling:  I discussed with the patient the risks of metronidazole including but not limited to seizures, nausea/vomiting, a metallic taste in the mouth, nausea/vomiting and severe allergy. Tazorac Pregnancy And Lactation Text: This medication is not safe during pregnancy. It is unknown if this medication is excreted in breast milk. Hydroxyzine Pregnancy And Lactation Text: This medication is not safe during pregnancy and should not be taken. It is also excreted in breast milk and breast feeding isn't recommended. Thalidomide Counseling: I discussed with the patient the risks of thalidomide including but not limited to birth defects, anxiety, weakness, chest pain, dizziness, cough and severe allergy. Minoxidil Counseling: Minoxidil is a topical medication which can increase blood flow where it is applied. It is uncertain how this medication increases hair growth. Side effects are uncommon and include stinging and allergic reactions. Litfulo Pregnancy And Lactation Text: Based on animal studies, Lifulo may cause embryo-fetal harm when administered to pregnant women.  The medication should not be used in pregnancy.  Breastfeeding is not recommended during treatment. Colchicine Counseling:  Patient counseled regarding adverse effects including but not limited to stomach upset (nausea, vomiting, stomach pain, or diarrhea).  Patient instructed to limit alcohol consumption while taking this medication.  Colchicine may reduce blood counts especially with prolonged use.  The patient understands that monitoring of kidney function and blood counts may be required, especially at baseline. The patient verbalized understanding of the proper use and possible adverse effects of colchicine.  All of the patient's questions and concerns were addressed. Hyrimoz Pregnancy And Lactation Text: This medication is Pregnancy Category B and is considered safe during pregnancy. It is unknown if this medication is excreted in breast milk. Xeljanz Counseling: I discussed with the patient the risks of Xeljanz therapy including increased risk of infection, liver issues, headache, diarrhea, or cold symptoms. Live vaccines should be avoided. They were instructed to call if they have any problems. Elidel Pregnancy And Lactation Text: This medication is Pregnancy Category C. It is unknown if this medication is excreted in breast milk. Ketoconazole Counseling:   Patient counseled regarding improving absorption with orange juice.  Adverse effects include but are not limited to breast enlargement, headache, diarrhea, nausea, upset stomach, liver function test abnormalities, taste disturbance, and stomach pain.  There is a rare possibility of liver failure that can occur when taking ketoconazole. The patient understands that monitoring of LFTs may be required, especially at baseline. The patient verbalized understanding of the proper use and possible adverse effects of ketoconazole.  All of the patient's questions and concerns were addressed. Zyclara Counseling:  I discussed with the patient the risks of imiquimod including but not limited to erythema, scaling, itching, weeping, crusting, and pain.  Patient understands that the inflammatory response to imiquimod is variable from person to person and was educated regarded proper titration schedule.  If flu-like symptoms develop, patient knows to discontinue the medication and contact us. Tremfya Counseling: I discussed with the patient the risks of guselkumab including but not limited to immunosuppression, serious infections, and drug reactions.  The patient understands that monitoring is required including a PPD at baseline and must alert us or the primary physician if symptoms of infection or other concerning signs are noted. Bactrim Pregnancy And Lactation Text: This medication is Pregnancy Category D and is known to cause fetal risk.  It is also excreted in breast milk. Odomzo Pregnancy And Lactation Text: This medication is Pregnancy Category X and is absolutely contraindicated during pregnancy. It is unknown if it is excreted in breast milk. Oral Minoxidil Pregnancy And Lactation Text: This medication should only be used when clearly needed if you are pregnant, attempting to become pregnant or breast feeding. Low Dose Naltrexone Counseling- I discussed with the patient the potential risks and side effects of low dose naltrexone including but not limited to: more vivid dreams, headaches, nausea, vomiting, abdominal pain, fatigue, dizziness, and anxiety. Topical Sulfur Applications Pregnancy And Lactation Text: This medication is Pregnancy Category C and has an unknown safety profile during pregnancy. It is unknown if this topical medication is excreted in breast milk. Qbrexza Counseling:  I discussed with the patient the risks of Qbrexza including but not limited to headache, mydriasis, blurred vision, dry eyes, nasal dryness, dry mouth, dry throat, dry skin, urinary hesitation, and constipation.  Local skin reactions including erythema, burning, stinging, and itching can also occur. Tetracycline Counseling: Patient counseled regarding possible photosensitivity and increased risk for sunburn.  Patient instructed to avoid sunlight, if possible.  When exposed to sunlight, patients should wear protective clothing, sunglasses, and sunscreen.  The patient was instructed to call the office immediately if the following severe adverse effects occur:  hearing changes, easy bruising/bleeding, severe headache, or vision changes.  The patient verbalized understanding of the proper use and possible adverse effects of tetracycline.  All of the patient's questions and concerns were addressed. Patient understands to avoid pregnancy while on therapy due to potential birth defects. Spironolactone Counseling: Patient advised regarding risks of diarrhea, abdominal pain, hyperkalemia, birth defects (for female patients), liver toxicity and renal toxicity. The patient may need blood work to monitor liver and kidney function and potassium levels while on therapy. The patient verbalized understanding of the proper use and possible adverse effects of spironolactone.  All of the patient's questions and concerns were addressed. Xelnicholasz Pregnancy And Lactation Text: This medication is Pregnancy Category D and is not considered safe during pregnancy.  The risk during breast feeding is also uncertain. Ilumya Counseling: I discussed with the patient the risks of tildrakizumab including but not limited to immunosuppression, malignancy, posterior leukoencephalopathy syndrome, and serious infections.  The patient understands that monitoring is required including a PPD at baseline and must alert us or the primary physician if symptoms of infection or other concerning signs are noted. Ketoconazole Pregnancy And Lactation Text: This medication is Pregnancy Category C and it isn't know if it is safe during pregnancy. It is also excreted in breast milk and breast feeding isn't recommended. Metronidazole Pregnancy And Lactation Text: This medication is Pregnancy Category B and considered safe during pregnancy.  It is also excreted in breast milk. Minoxidil Pregnancy And Lactation Text: This medication has not been assigned a Pregnancy Risk Category but animal studies failed to show danger with the topical medication. It is unknown if the medication is excreted in breast milk. Cyclosporine Pregnancy And Lactation Text: This medication is Pregnancy Category C and it isn't know if it is safe during pregnancy. This medication is excreted in breast milk. Calcipotriene Counseling:  I discussed with the patient the risks of calcipotriene including but not limited to erythema, scaling, itching, and irritation. Topical Clindamycin Counseling: Patient counseled that this medication may cause skin irritation or allergic reactions.  In the event of skin irritation, the patient was advised to reduce the amount of the drug applied or use it less frequently.   The patient verbalized understanding of the proper use and possible adverse effects of clindamycin.  All of the patient's questions and concerns were addressed. Cimzia Counseling:  I discussed with the patient the risks of Cimzia including but not limited to immunosuppression, allergic reactions and infections.  The patient understands that monitoring is required including a PPD at baseline and must alert us or the primary physician if symptoms of infection or other concerning signs are noted. Tazorac Counseling:  Patient advised that medication is irritating and drying.  Patient may need to apply sparingly and wash off after an hour before eventually leaving it on overnight.  The patient verbalized understanding of the proper use and possible adverse effects of tazorac.  All of the patient's questions and concerns were addressed. Otezla Counseling: The side effects of Otezla were discussed with the patient, including but not limited to worsening or new depression, weight loss, diarrhea, nausea, upper respiratory tract infection, and headache. Patient instructed to call the office should any adverse effect occur.  The patient verbalized understanding of the proper use and possible adverse effects of Otezla.  All the patient's questions and concerns were addressed. Eucrisa Counseling: Patient may experience a mild burning sensation during topical application. Eucrisa is not approved in children less than 2 years of age. Albendazole Counseling:  I discussed with the patient the risks of albendazole including but not limited to cytopenia, kidney damage, nausea/vomiting and severe allergy.  The patient understands that this medication is being used in an off-label manner. Spironolactone Pregnancy And Lactation Text: This medication can cause feminization of the male fetus and should be avoided during pregnancy. The active metabolite is also found in breast milk. Cephalexin Counseling: I counseled the patient regarding use of cephalexin as an antibiotic for prophylactic and/or therapeutic purposes. Cephalexin (commonly prescribed under brand name Keflex) is a cephalosporin antibiotic which is active against numerous classes of bacteria, including most skin bacteria. Side effects may include nausea, diarrhea, gastrointestinal upset, rash, hives, yeast infections, and in rare cases, hepatitis, kidney disease, seizures, fever, confusion, neurologic symptoms, and others. Patients with severe allergies to penicillin medications are cautioned that there is about a 10% incidence of cross-reactivity with cephalosporins. When possible, patients with penicillin allergies should use alternatives to cephalosporins for antibiotic therapy. Tranexamic Acid Counseling:  Patient advised of the small risk of bleeding problems with tranexamic acid. They were also instructed to call if they developed any nausea, vomiting or diarrhea. All of the patient's questions and concerns were addressed. Terbinafine Counseling: Patient counseling regarding adverse effects of terbinafine including but not limited to headache, diarrhea, rash, upset stomach, liver function test abnormalities, itching, taste/smell disturbance, nausea, abdominal pain, and flatulence.  There is a rare possibility of liver failure that can occur when taking terbinafine.  The patient understands that a baseline LFT and kidney function test may be required. The patient verbalized understanding of the proper use and possible adverse effects of terbinafine.  All of the patient's questions and concerns were addressed. Dutasteride Male Counseling: Dustasteride Counseling:  I discussed with the patient the risks of use of dutasteride including but not limited to decreased libido, decreased ejaculate volume, and gynecomastia. Women who can become pregnant should not handle medication.  All of the patient's questions and concerns were addressed. Calcipotriene Pregnancy And Lactation Text: The use of this medication during pregnancy or lactation is not recommended as there is insufficient data. Cimzia Pregnancy And Lactation Text: This medication crosses the placenta but can be considered safe in certain situations. Cimzia may be excreted in breast milk. Low Dose Naltrexone Pregnancy And Lactation Text: Naltrexone is pregnancy category C.  There have been no adequate and well-controlled studies in pregnant women.  It should be used in pregnancy only if the potential benefit justifies the potential risk to the fetus.   Limited data indicates that naltrexone is minimally excreted into breastmilk. Qbrexza Pregnancy And Lactation Text: There is no available data on Qbrexza use in pregnant women.  There is no available data on Qbrexza use in lactation. Wartpeel Counseling:  I discussed with the patient the risks of Wartpeel including but not limited to erythema, scaling, itching, weeping, crusting, and pain. Skyrizi Counseling: I discussed with the patient the risks of risankizumab-rzaa including but not limited to immunosuppression, and serious infections.  The patient understands that monitoring is required including a PPD at baseline and must alert us or the primary physician if symptoms of infection or other concerning signs are noted. Methotrexate Counseling:  Patient counseled regarding adverse effects of methotrexate including but not limited to nausea, vomiting, abnormalities in liver function tests. Patients may develop mouth sores, rash, diarrhea, and abnormalities in blood counts. The patient understands that monitoring is required including LFT's and blood counts.  There is a rare possibility of scarring of the liver and lung problems that can occur when taking methotrexate. Persistent nausea, loss of appetite, pale stools, dark urine, cough, and shortness of breath should be reported immediately. Patient advised to discontinue methotrexate treatment at least three months before attempting to become pregnant.  I discussed the need for folate supplements while taking methotrexate.  These supplements can decrease side effects during methotrexate treatment. The patient verbalized understanding of the proper use and possible adverse effects of methotrexate.  All of the patient's questions and concerns were addressed. Dapsone Counseling: I discussed with the patient the risks of dapsone including but not limited to hemolytic anemia, agranulocytosis, rashes, methemoglobinemia, kidney failure, peripheral neuropathy, headaches, GI upset, and liver toxicity.  Patients who start dapsone require monitoring including baseline LFTs and weekly CBCs for the first month, then every month thereafter.  The patient verbalized understanding of the proper use and possible adverse effects of dapsone.  All of the patient's questions and concerns were addressed. Minocycline Counseling: Patient advised regarding possible photosensitivity and discoloration of the teeth, skin, lips, tongue and gums.  Patient instructed to avoid sunlight, if possible.  When exposed to sunlight, patients should wear protective clothing, sunglasses, and sunscreen.  The patient was instructed to call the office immediately if the following severe adverse effects occur:  hearing changes, easy bruising/bleeding, severe headache, or vision changes.  The patient verbalized understanding of the proper use and possible adverse effects of minocycline.  All of the patient's questions and concerns were addressed. Topical Clindamycin Pregnancy And Lactation Text: This medication is Pregnancy Category B and is considered safe during pregnancy. It is unknown if it is excreted in breast milk. Mirvaso Counseling: Mirvaso is a topical medication which can decrease superficial blood flow where applied. Side effects are uncommon and include stinging, redness and allergic reactions. Acitretin Counseling:  I discussed with the patient the risks of acitretin including but not limited to hair loss, dry lips/skin/eyes, liver damage, hyperlipidemia, depression/suicidal ideation, photosensitivity.  Serious rare side effects can include but are not limited to pancreatitis, pseudotumor cerebri, bony changes, clot formation/stroke/heart attack.  Patient understands that alcohol is contraindicated since it can result in liver toxicity and significantly prolong the elimination of the drug by many years. Dupixent Counseling: I discussed with the patient the risks of dupilumab including but not limited to eye infection and irritation, cold sores, injection site reactions, worsening of asthma, allergic reactions and increased risk of parasitic infection.  Live vaccines should be avoided while taking dupilumab. Dupilumab will also interact with certain medications such as warfarin and cyclosporine. The patient understands that monitoring is required and they must alert us or the primary physician if symptoms of infection or other concerning signs are noted. Cephalexin Pregnancy And Lactation Text: This medication is Pregnancy Category B and considered safe during pregnancy.  It is also excreted in breast milk but can be used safely for shorter doses. Albendazole Pregnancy And Lactation Text: This medication is Pregnancy Category C and it isn't known if it is safe during pregnancy. It is also excreted in breast milk. Otezla Pregnancy And Lactation Text: This medication is Pregnancy Category C and it isn't known if it is safe during pregnancy. It is unknown if it is excreted in breast milk. Aklief counseling:  Patient advised to apply a pea-sized amount only at bedtime and wait 30 minutes after washing their face before applying.  If too drying, patient may add a non-comedogenic moisturizer.  The most commonly reported side effects including irritation, redness, scaling, dryness, stinging, burning, itching, and increased risk of sunburn.  The patient verbalized understanding of the proper use and possible adverse effects of retinoids.  All of the patient's questions and concerns were addressed. Xolair Counseling:  Patient informed of potential adverse effects including but not limited to fever, muscle aches, rash and allergic reactions.  The patient verbalized understanding of the proper use and possible adverse effects of Xolair.  All of the patient's questions and concerns were addressed. Cosentyx Counseling:  I discussed with the patient the risks of Cosentyx including but not limited to worsening of Crohn's disease, immunosuppression, allergic reactions and infections.  The patient understands that monitoring is required including a PPD at baseline and must alert us or the primary physician if symptoms of infection or other concerning signs are noted. Cantharidin Counseling:  I discussed with the patient the risks of Cantharidin including but not limited to pain, redness, burning, itching, and blistering. Opioid Counseling: I discussed with the patient the potential side effects of opioids including but not limited to addiction, altered mental status, and depression. I stressed avoiding alcohol, benzodiazepines, muscle relaxants and sleep aids unless specifically okayed by a physician. The patient verbalized understanding of the proper use and possible adverse effects of opioids. All of the patient's questions and concerns were addressed. They were instructed to flush the remaining pills down the toilet if they did not need them for pain. Tranexamic Acid Pregnancy And Lactation Text: It is unknown if this medication is safe during pregnancy or breast feeding. Methotrexate Pregnancy And Lactation Text: This medication is Pregnancy Category X and is known to cause fetal harm. This medication is excreted in breast milk. Rhofade Counseling: Rhofade is a topical medication which can decrease superficial blood flow where applied. Side effects are uncommon and include stinging, redness and allergic reactions. Niacinamide Counseling: I recommended taking niacin or niacinamide, also know as vitamin B3, twice daily. Recent evidence suggests that taking vitamin B3 (500 mg twice daily) can reduce the risk of actinic keratoses and non-melanoma skin cancers. Side effects of vitamin B3 include flushing and headache. Hydroquinone Counseling:  Patient advised that medication may result in skin irritation, lightening (hypopigmentation), dryness, and burning.  In the event of skin irritation, the patient was advised to reduce the amount of the drug applied or use it less frequently.  Rarely, spots that are treated with hydroquinone can become darker (pseudoochronosis).  Should this occur, patient instructed to stop medication and call the office. The patient verbalized understanding of the proper use and possible adverse effects of hydroquinone.  All of the patient's questions and concerns were addressed. Dapsone Pregnancy And Lactation Text: This medication is Pregnancy Category C and is not considered safe during pregnancy or breast feeding. Mirvaso Pregnancy And Lactation Text: This medication has not been assigned a Pregnancy Risk Category. It is unknown if the medication is excreted in breast milk. Detail Level: Simple Dutasteride Female Counseling: Dutasteride Counseling:  I discussed with the patient the risks of use of dutasteride including but not limited to decreased libido and sexual dysfunction. Explained the teratogenic nature of the medication and stressed the importance of not getting pregnant during treatment. All of the patient's questions and concerns were addressed. Infliximab Counseling:  I discussed with the patient the risks of infliximab including but not limited to myelosuppression, immunosuppression, autoimmune hepatitis, demyelinating diseases, lymphoma, and serious infections.  The patient understands that monitoring is required including a PPD at baseline and must alert us or the primary physician if symptoms of infection or other concerning signs are noted. Terbinafine Pregnancy And Lactation Text: This medication is Pregnancy Category B and is considered safe during pregnancy. It is also excreted in breast milk and breast feeding isn't recommended. Xolair Pregnancy And Lactation Text: This medication is Pregnancy Category B and is considered safe during pregnancy. This medication is excreted in breast milk. Olumiant Counseling: I discussed with the patient the risks of Olumiant therapy including but not limited to upper respiratory tract infections, shingles, cold sores, and nausea. Live vaccines should be avoided.  This medication has been linked to serious infections; higher rate of mortality; malignancy and lymphoproliferative disorders; major adverse cardiovascular events; thrombosis; gastrointestinal perforations; neutropenia; lymphopenia; anemia; liver enzyme elevations; and lipid elevations. Acitretin Pregnancy And Lactation Text: This medication is Pregnancy Category X and should not be given to women who are pregnant or may become pregnant in the future. This medication is excreted in breast milk. Fluconazole Counseling:  Patient counseled regarding adverse effects of fluconazole including but not limited to headache, diarrhea, nausea, upset stomach, liver function test abnormalities, taste disturbance, and stomach pain.  There is a rare possibility of liver failure that can occur when taking fluconazole.  The patient understands that monitoring of LFTs and kidney function test may be required, especially at baseline. The patient verbalized understanding of the proper use and possible adverse effects of fluconazole.  All of the patient's questions and concerns were addressed. Clindamycin Counseling: I counseled the patient regarding use of clindamycin as an antibiotic for prophylactic and/or therapeutic purposes. Clindamycin is active against numerous classes of bacteria, including skin bacteria. Side effects may include nausea, diarrhea, gastrointestinal upset, rash, hives, yeast infections, and in rare cases, colitis. Cantharidin Pregnancy And Lactation Text: This medication has not been proven safe during pregnancy. It is unknown if this medication is excreted in breast milk. Topical Ketoconazole Counseling: Patient counseled that this medication may cause skin irritation or allergic reactions.  In the event of skin irritation, the patient was advised to reduce the amount of the drug applied or use it less frequently.   The patient verbalized understanding of the proper use and possible adverse effects of ketoconazole.  All of the patient's questions and concerns were addressed. Azathioprine Counseling:  I discussed with the patient the risks of azathioprine including but not limited to myelosuppression, immunosuppression, hepatotoxicity, lymphoma, and infections.  The patient understands that monitoring is required including baseline LFTs, Creatinine, possible TPMP genotyping and weekly CBCs for the first month and then every 2 weeks thereafter.  The patient verbalized understanding of the proper use and possible adverse effects of azathioprine.  All of the patient's questions and concerns were addressed. Oxybutynin Counseling:  I discussed with the patient the risks of oxybutynin including but not limited to skin rash, drowsiness, dry mouth, difficulty urinating, and blurred vision. Dupixent Pregnancy And Lactation Text: This medication likely crosses the placenta but the risk for the fetus is uncertain. This medication is excreted in breast milk. Aklief Pregnancy And Lactation Text: It is unknown if this medication is safe to use during pregnancy.  It is unknown if this medication is excreted in breast milk.  Breastfeeding women should use the topical cream on the smallest area of the skin for the shortest time needed while breastfeeding.  Do not apply to nipple and areola. Ivermectin Counseling:  Patient instructed to take medication on an empty stomach with a full glass of water.  Patient informed of potential adverse effects including but not limited to nausea, diarrhea, dizziness, itching, and swelling of the extremities or lymph nodes.  The patient verbalized understanding of the proper use and possible adverse effects of ivermectin.  All of the patient's questions and concerns were addressed. Niacinamide Pregnancy And Lactation Text: These medications are considered safe during pregnancy. Winlevi Counseling:  I discussed with the patient the risks of topical clascoterone including but not limited to erythema, scaling, itching, and stinging. Patient voiced their understanding. Spevigo Counseling: I discussed with the patient the risks of Spevigo including but not limited to fatigue, nasuea, vomiting, headache, pruritus, urinary tract infection, an infusion related reactions.  The patient understands that monitoring is required including screening for tuberculosis at baseline and yearly screening thereafter while continuing Spevigo therapy. They should contact us if symptoms of infection or other concerning signs are noted. Opioid Pregnancy And Lactation Text: These medications can lead to premature delivery and should be avoided during pregnancy. These medications are also present in breast milk in small amounts. Prednisone Counseling:  I discussed with the patient the risks of prolonged use of prednisone including but not limited to weight gain, insomnia, osteoporosis, mood changes, diabetes, susceptibility to infection, glaucoma and high blood pressure.  In cases where prednisone use is prolonged, patients should be monitored with blood pressure checks, serum glucose levels and an eye exam.  Additionally, the patient may need to be placed on GI prophylaxis, PCP prophylaxis, and calcium and vitamin D supplementation and/or a bisphosphonate.  The patient verbalized understanding of the proper use and the possible adverse effects of prednisone.  All of the patient's questions and concerns were addressed. Include Pregnancy/Lactation Warning?: No Dutasteride Pregnancy And Lactation Text: This medication is absolutely contraindicated in women, especially during pregnancy and breast feeding. Feminization of male fetuses is possible if taking while pregnant. Valtrex Counseling: I discussed with the patient the risks of valacyclovir including but not limited to kidney damage, nausea, vomiting and severe allergy.  The patient understands that if the infection seems to be worsening or is not improving, they are to call. 5-Fu Counseling: 5-Fluorouracil Counseling:  I discussed with the patient the risks of 5-fluorouracil including but not limited to erythema, scaling, itching, weeping, crusting, and pain. Azelaic Acid Counseling: Patient counseled that medicine may cause skin irritation and to avoid applying near the eyes.  In the event of skin irritation, the patient was advised to reduce the amount of the drug applied or use it less frequently.   The patient verbalized understanding of the proper use and possible adverse effects of azelaic acid.  All of the patient's questions and concerns were addressed. Azathioprine Pregnancy And Lactation Text: This medication is Pregnancy Category D and isn't considered safe during pregnancy. It is unknown if this medication is excreted in breast milk. Quinolones Counseling:  I discussed with the patient the risks of fluoroquinolones including but not limited to GI upset, allergic reaction, drug rash, diarrhea, dizziness, photosensitivity, yeast infections, liver function test abnormalities, tendonitis/tendon rupture. Gabapentin Counseling: I discussed with the patient the risks of gabapentin including but not limited to dizziness, somnolence, fatigue and ataxia. Opzelura Counseling:  I discussed with the patient the risks of Opzelura including but not limited to nasopharngitis, bronchitis, ear infection, eosinophila, hives, diarrhea, folliculitis, tonsillitis, and rhinorrhea.  Taken orally, this medication has been linked to serious infections; higher rate of mortality; malignancy and lymphoproliferative disorders; major adverse cardiovascular events; thrombosis; thrombocytopenia, anemia, and neutropenia; and lipid elevations. Enbrel Counseling:  I discussed with the patient the risks of etanercept including but not limited to myelosuppression, immunosuppression, autoimmune hepatitis, demyelinating diseases, lymphoma, and infections.  The patient understands that monitoring is required including a PPD at baseline and must alert us or the primary physician if symptoms of infection or other concerning signs are noted. Clindamycin Pregnancy And Lactation Text: This medication can be used in pregnancy if certain situations. Clindamycin is also present in breast milk. Olumiant Pregnancy And Lactation Text: Based on animal studies, Olumiant may cause embryo-fetal harm when administered to pregnant women.  The medication should not be used in pregnancy.  Breastfeeding is not recommended during treatment. Cimetidine Counseling:  I discussed with the patient the risks of Cimetidine including but not limited to gynecomastia, headache, diarrhea, nausea, drowsiness, arrhythmias, pancreatitis, skin rashes, psychosis, bone marrow suppression and kidney toxicity. Bexarotene Counseling:  I discussed with the patient the risks of bexarotene including but not limited to hair loss, dry lips/skin/eyes, liver abnormalities, hyperlipidemia, pancreatitis, depression/suicidal ideation, photosensitivity, drug rash/allergic reactions, hypothyroidism, anemia, leukopenia, infection, cataracts, and teratogenicity.  Patient understands that they will need regular blood tests to check lipid profile, liver function tests, white blood cell count, thyroid function tests and pregnancy test if applicable. Erivedge Counseling- I discussed with the patient the risks of Erivedge including but not limited to nausea, vomiting, diarrhea, constipation, weight loss, changes in the sense of taste, decreased appetite, muscle spasms, and hair loss.  The patient verbalized understanding of the proper use and possible adverse effects of Erivedge.  All of the patient's questions and concerns were addressed. Nsaids Counseling: NSAID Counseling: I discussed with the patient that NSAIDs should be taken with food. Prolonged use of NSAIDs can result in the development of stomach ulcers.  Patient advised to stop taking NSAIDs if abdominal pain occurs.  The patient verbalized understanding of the proper use and possible adverse effects of NSAIDs.  All of the patient's questions and concerns were addressed. Solaraze Counseling:  I discussed with the patient the risks of Solaraze including but not limited to erythema, scaling, itching, weeping, crusting, and pain. Winlevi Pregnancy And Lactation Text: This medication is considered safe during pregnancy and breastfeeding. Spevigo Pregnancy And Lactation Text: The risk during pregnancy and breastfeeding is uncertain with this medication. This medication does cross the placenta. It is unknown if this medication is found in breast milk. Finasteride Male Counseling: Finasteride Counseling:  I discussed with the patient the risks of use of finasteride including but not limited to decreased libido, decreased ejaculate volume, gynecomastia, and depression. Women should not handle medication.  All of the patient's questions and concerns were addressed. Topical Metronidazole Counseling: Metronidazole is a topical antibiotic medication. You may experience burning, stinging, redness, or allergic reactions.  Please call our office if you develop any problems from using this medication. Rituxan Counseling:  I discussed with the patient the risks of Rituxan infusions. Side effects can include infusion reactions, severe drug rashes including mucocutaneous reactions, reactivation of latent hepatitis and other infections and rarely progressive multifocal leukoencephalopathy.  All of the patient's questions and concerns were addressed. Azelaic Acid Pregnancy And Lactation Text: This medication is considered safe during pregnancy and breast feeding. Opzelura Pregnancy And Lactation Text: There is insufficient data to evaluate drug-associated risk for major birth defects, miscarriage, or other adverse maternal or fetal outcomes.  There is a pregnancy registry that monitors pregnancy outcomes in pregnant persons exposed to the medication during pregnancy.  It is unknown if this medication is excreted in breast milk.  Do not breastfeed during treatment and for about 4 weeks after the last dose. Cellcept Counseling:  I discussed with the patient the risks of mycophenolate mofetil including but not limited to infection/immunosuppression, GI upset, hypokalemia, hypercholesterolemia, bone marrow suppression, lymphoproliferative disorders, malignancy, GI ulceration/bleed/perforation, colitis, interstitial lung disease, kidney failure, progressive multifocal leukoencephalopathy, and birth defects.  The patient understands that monitoring is required including a baseline creatinine and regular CBC testing. In addition, patient must alert us immediately if symptoms of infection or other concerning signs are noted. Valtrex Pregnancy And Lactation Text: this medication is Pregnancy Category B and is considered safe during pregnancy. This medication is not directly found in breast milk but it's metabolite acyclovir is present. Arava Counseling:  Patient counseled regarding adverse effects of Arava including but not limited to nausea, vomiting, abnormalities in liver function tests. Patients may develop mouth sores, rash, diarrhea, and abnormalities in blood counts. The patient understands that monitoring is required including LFTs and blood counts.  There is a rare possibility of scarring of the liver and lung problems that can occur when taking methotrexate. Persistent nausea, loss of appetite, pale stools, dark urine, cough, and shortness of breath should be reported immediately. Patient advised to discontinue Arava treatment and consult with a physician prior to attempting conception. The patient will have to undergo a treatment to eliminate Arava from the body prior to conception. Imiquimod Counseling:  I discussed with the patient the risks of imiquimod including but not limited to erythema, scaling, itching, weeping, crusting, and pain.  Patient understands that the inflammatory response to imiquimod is variable from person to person and was educated regarded proper titration schedule.  If flu-like symptoms develop, patient knows to discontinue the medication and contact us. Propranolol Counseling:  I discussed with the patient the risks of propranolol including but not limited to low heart rate, low blood pressure, low blood sugar, restlessness and increased cold sensitivity. They should call the office if they experience any of these side effects. Griseofulvin Counseling:  I discussed with the patient the risks of griseofulvin including but not limited to photosensitivity, cytopenia, liver damage, nausea/vomiting and severe allergy.  The patient understands that this medication is best absorbed when taken with a fatty meal (e.g., ice cream or french fries). Rinvoq Counseling: I discussed with the patient the risks of Rinvoq therapy including but not limited to upper respiratory tract infections, shingles, cold sores, bronchitis, nausea, cough, fever, acne, and headache. Live vaccines should be avoided.  This medication has been linked to serious infections; higher rate of mortality; malignancy and lymphoproliferative disorders; major adverse cardiovascular events; thrombosis; thrombocytopenia, anemia, and neutropenia; lipid elevations; liver enzyme elevations; and gastrointestinal perforations. VTAMA Counseling: I discussed with the patient that VTAMA is not for use in the eyes, mouth or mouth. They should call the office if they develop any signs of allergic reactions to VTAMA. The patient verbalized understanding of the proper use and possible adverse effects of VTAMA.  All of the patient's questions and concerns were addressed. Stelara Counseling:  I discussed with the patient the risks of ustekinumab including but not limited to immunosuppression, malignancy, posterior leukoencephalopathy syndrome, and serious infections.  The patient understands that monitoring is required including a PPD at baseline and must alert us or the primary physician if symptoms of infection or other concerning signs are noted. Solaraze Pregnancy And Lactation Text: This medication is Pregnancy Category B and is considered safe. There is some data to suggest avoiding during the third trimester. It is unknown if this medication is excreted in breast milk. Doxycycline Counseling:  Patient counseled regarding possible photosensitivity and increased risk for sunburn.  Patient instructed to avoid sunlight, if possible.  When exposed to sunlight, patients should wear protective clothing, sunglasses, and sunscreen.  The patient was instructed to call the office immediately if the following severe adverse effects occur:  hearing changes, easy bruising/bleeding, severe headache, or vision changes.  The patient verbalized understanding of the proper use and possible adverse effects of doxycycline.  All of the patient's questions and concerns were addressed. Bexarotene Pregnancy And Lactation Text: This medication is Pregnancy Category X and should not be given to women who are pregnant or may become pregnant. This medication should not be used if you are breast feeding. Nsaids Pregnancy And Lactation Text: These medications are considered safe up to 30 weeks gestation. It is excreted in breast milk. Glycopyrrolate Counseling:  I discussed with the patient the risks of glycopyrrolate including but not limited to skin rash, drowsiness, dry mouth, difficulty urinating, and blurred vision. Drysol Counseling:  I discussed with the patient the risks of drysol/aluminum chloride including but not limited to skin rash, itching, irritation, burning. Topical Metronidazole Pregnancy And Lactation Text: This medication is Pregnancy Category B and considered safe during pregnancy.  It is also considered safe to use while breastfeeding. Finasteride Female Counseling: Finasteride Counseling:  I discussed with the patient the risks of use of finasteride including but not limited to decreased libido and sexual dysfunction. Explained the teratogenic nature of the medication and stressed the importance of not getting pregnant during treatment. All of the patient's questions and concerns were addressed. Rinvoq Pregnancy And Lactation Text: Based on animal studies, Rinvoq may cause embryo-fetal harm when administered to pregnant women.  The medication should not be used in pregnancy.  Breastfeeding is not recommended during treatment and for 6 days after the last dose. Rituxan Pregnancy And Lactation Text: This medication is Pregnancy Category C and it isn't know if it is safe during pregnancy. It is unknown if this medication is excreted in breast milk but similar antibodies are known to be excreted. Griseofulvin Pregnancy And Lactation Text: This medication is Pregnancy Category X and is known to cause serious birth defects. It is unknown if this medication is excreted in breast milk but breast feeding should be avoided. Adbry Counseling: I discussed with the patient the risks of tralokinumab including but not limited to eye infection and irritation, cold sores, injection site reactions, worsening of asthma, allergic reactions and increased risk of parasitic infection.  Live vaccines should be avoided while taking tralokinumab. The patient understands that monitoring is required and they must alert us or the primary physician if symptoms of infection or other concerning signs are noted. Cibinqo Counseling: I discussed with the patient the risks of Cibinqo therapy including but not limited to common cold, nausea, headache, cold sores, increased blood CPK levels, dizziness, UTIs, fatigue, acne, and vomitting. Live vaccines should be avoided.  This medication has been linked to serious infections; higher rate of mortality; malignancy and lymphoproliferative disorders; major adverse cardiovascular events; thrombosis; thrombocytopenia and lymphopenia; lipid elevations; and retinal detachment. Picato Counseling:  I discussed with the patient the risks of Picato including but not limited to erythema, scaling, itching, weeping, crusting, and pain. Rifampin Counseling: I discussed with the patient the risks of rifampin including but not limited to liver damage, kidney damage, red-orange body fluids, nausea/vomiting and severe allergy. Benzoyl Peroxide Counseling: Patient counseled that medicine may cause skin irritation and bleach clothing.  In the event of skin irritation, the patient was advised to reduce the amount of the drug applied or use it less frequently.   The patient verbalized understanding of the proper use and possible adverse effects of benzoyl peroxide.  All of the patient's questions and concerns were addressed. Humira Counseling:  I discussed with the patient the risks of adalimumab including but not limited to myelosuppression, immunosuppression, autoimmune hepatitis, demyelinating diseases, lymphoma, and serious infections.  The patient understands that monitoring is required including a PPD at baseline and must alert us or the primary physician if symptoms of infection or other concerning signs are noted. Isotretinoin Counseling: Patient should get monthly blood tests, not donate blood, not drive at night if vision affected, not share medication, and not undergo elective surgery for 6 months after tx completed. Side effects reviewed, pt to contact office should one occur. Doxycycline Pregnancy And Lactation Text: This medication is Pregnancy Category D and not consider safe during pregnancy. It is also excreted in breast milk but is considered safe for shorter treatment courses. Olanzapine Counseling- I discussed with the patient the common side effects of olanzapine including but are not limited to: lack of energy, dry mouth, increased appetite, sleepiness, tremor, constipation, dizziness, changes in behavior, or restlessness.  Explained that teenagers are more likely to experience headaches, abdominal pain, pain in the arms or legs, tiredness, and sleepiness.  Serious side effects include but are not limited: increased risk of death in elderly patients who are confused, have memory loss, or dementia-related psychosis; hyperglycemia; increased cholesterol and triglycerides; and weight gain. Soolantra Counseling: I discussed with the patients the risks of topial Soolantra. This is a medicine which decreases the number of mites and inflammation in the skin. You experience burning, stinging, eye irritation or allergic reactions.  Please call our office if you develop any problems from using this medication. Doxepin Counseling:  Patient advised that the medication is sedating and not to drive a car after taking this medication. Patient informed of potential adverse effects including but not limited to dry mouth, urinary retention, and blurry vision.  The patient verbalized understanding of the proper use and possible adverse effects of doxepin.  All of the patient's questions and concerns were addressed. Propranolol Pregnancy And Lactation Text: This medication is Pregnancy Category C and it isn't known if it is safe during pregnancy. It is excreted in breast milk. Libtayo Counseling- I discussed with the patient the risks of Libtayo including but not limited to nausea, vomiting, diarrhea, and bone or muscle pain.  The patient verbalized understanding of the proper use and possible adverse effects of Libtayo.  All of the patient's questions and concerns were addressed. Itraconazole Counseling:  I discussed with the patient the risks of itraconazole including but not limited to liver damage, nausea/vomiting, neuropathy, and severe allergy.  The patient understands that this medication is best absorbed when taken with acidic beverages such as non-diet cola or ginger ale.  The patient understands that monitoring is required including baseline LFTs and repeat LFTs at intervals.  The patient understands that they are to contact us or the primary physician if concerning signs are noted. Cibinqo Pregnancy And Lactation Text: It is unknown if this medication will adversely affect pregnancy or breast feeding.  You should not take this medication if you are currently pregnant or planning a pregnancy or while breastfeeding. Klisyri Counseling:  I discussed with the patient the risks of Klisyri including but not limited to erythema, scaling, itching, weeping, crusting, and pain. Sotyktu Counseling:  I discussed the most common side effects of Sotyktu including: common cold, sore throat, sinus infections, cold sores, canker sores, folliculitis, and acne.? I also discussed more serious side effects of Sotyktu including but not limited to: serious allergic reactions; increased risk for infections such as TB; cancers such as lymphomas; rhabdomyolysis and elevated CPK; and elevated triglycerides and liver enzymes.? Rifampin Pregnancy And Lactation Text: This medication is Pregnancy Category C and it isn't know if it is safe during pregnancy. It is also excreted in breast milk and should not be used if you are breast feeding. Benzoyl Peroxide Pregnancy And Lactation Text: This medication is Pregnancy Category C. It is unknown if benzoyl peroxide is excreted in breast milk. Finasteride Pregnancy And Lactation Text: This medication is absolutely contraindicated during pregnancy. It is unknown if it is excreted in breast milk. Adbry Pregnancy And Lactation Text: It is unknown if this medication will adversely affect pregnancy or breast feeding. Azithromycin Counseling:  I discussed with the patient the risks of azithromycin including but not limited to GI upset, allergic reaction, drug rash, diarrhea, and yeast infections. Glycopyrrolate Pregnancy And Lactation Text: This medication is Pregnancy Category B and is considered safe during pregnancy. It is unknown if it is excreted breast milk. Topical Steroids Counseling: I discussed with the patient that prolonged use of topical steroids can result in the increased appearance of superficial blood vessels (telangiectasias), lightening (hypopigmentation) and thinning of the skin (atrophy).  Patient understands to avoid using high potency steroids in skin folds, the groin or the face.  The patient verbalized understanding of the proper use and possible adverse effects of topical steroids.  All of the patient's questions and concerns were addressed. Siliq Counseling:  I discussed with the patient the risks of Siliq including but not limited to new or worsening depression, suicidal thoughts and behavior, immunosuppression, malignancy, posterior leukoencephalopathy syndrome, and serious infections.  The patient understands that monitoring is required including a PPD at baseline and must alert us or the primary physician if symptoms of infection or other concerning signs are noted. There is also a special program designed to monitor depression which is required with Siliq. Cyclophosphamide Counseling:  I discussed with the patient the risks of cyclophosphamide including but not limited to hair loss, hormonal abnormalities, decreased fertility, abdominal pain, diarrhea, nausea and vomiting, bone marrow suppression and infection. The patient understands that monitoring is required while taking this medication. Doxepin Pregnancy And Lactation Text: This medication is Pregnancy Category C and it isn't known if it is safe during pregnancy. It is also excreted in breast milk and breast feeding isn't recommended. Clofazimine Counseling:  I discussed with the patient the risks of clofazimine including but not limited to skin and eye pigmentation, liver damage, nausea/vomiting, gastrointestinal bleeding and allergy. Isotretinoin Pregnancy And Lactation Text: This medication is Pregnancy Category X and is considered extremely dangerous during pregnancy. It is unknown if it is excreted in breast milk. Erythromycin Counseling:  I discussed with the patient the risks of erythromycin including but not limited to GI upset, allergic reaction, drug rash, diarrhea, increase in liver enzymes, and yeast infections. SSKI Counseling:  I discussed with the patient the risks of SSKI including but not limited to thyroid abnormalities, metallic taste, GI upset, fever, headache, acne, arthralgias, paraesthesias, lymphadenopathy, easy bleeding, arrhythmias, and allergic reaction.
